# Patient Record
Sex: FEMALE | Race: WHITE | Employment: OTHER | URBAN - METROPOLITAN AREA
[De-identification: names, ages, dates, MRNs, and addresses within clinical notes are randomized per-mention and may not be internally consistent; named-entity substitution may affect disease eponyms.]

---

## 2020-06-30 ENCOUNTER — TELEPHONE (OUTPATIENT)
Dept: PULMONOLOGY | Age: 65
End: 2020-06-30

## 2020-07-02 ENCOUNTER — TELEPHONE (OUTPATIENT)
Dept: PULMONOLOGY | Age: 65
End: 2020-07-02

## 2020-07-02 ENCOUNTER — VIRTUAL VISIT (OUTPATIENT)
Dept: PULMONOLOGY | Age: 65
End: 2020-07-02
Payer: MEDICARE

## 2020-07-02 PROCEDURE — 99204 OFFICE O/P NEW MOD 45 MIN: CPT | Performed by: INTERNAL MEDICINE

## 2020-07-02 RX ORDER — LISINOPRIL AND HYDROCHLOROTHIAZIDE 12.5; 1 MG/1; MG/1
1 TABLET ORAL DAILY
COMMUNITY

## 2020-07-02 RX ORDER — ALBUTEROL SULFATE 90 UG/1
2 AEROSOL, METERED RESPIRATORY (INHALATION) EVERY 6 HOURS PRN
Qty: 1 INHALER | Refills: 6 | Status: SHIPPED | OUTPATIENT
Start: 2020-07-02 | End: 2020-08-03

## 2020-07-02 RX ORDER — MULTIVIT WITH MINERALS/LUTEIN
1000 TABLET ORAL DAILY
COMMUNITY
End: 2022-09-13

## 2020-07-02 NOTE — PROGRESS NOTES
[facility]                                                          TELEHEALTH EVALUATION: Service performed was Audio/Visual (During MQUNX-58 public health emergency) and not a face-to-face visit           CHIEF COMPLAINT: Cough    Consulting provider: VEL Tracy CNP      HPI:   Cough since 11/2019, started with sinus infection/bronchitis. Received rounds of Abx. In Jan 2020 had CXR normal. Last Abx Jan 2020. Moderate. Associated with sputum- yellow and green at first and now clear. No hemoptysis. Started on [address] in November does not use. Worse at night and when laying down. Not sure what makes it better- maybe INH made it better. Smoker 1 ppd for 48 years. Old records were reviewed and summarized by me. Past Medical History:   Diagnosis Date    CAD (coronary artery disease) 5772-2664    5 cardiac stents    Hypertension        Past Surgical History:        Procedure Laterality Date    CORONARY ANGIOPLASTY WITH STENT PLACEMENT      5 stents from 4909-8823    EYE SURGERY Left 07/01/2016    cataract    EYE SURGERY Right 07/22/2016    cataract    FOOT SURGERY Bilateral     HAND SURGERY Left     Trigger finger    HYSTERECTOMY         Allergies:  has No Known Allergies. Social History:    TOBACCO:   reports that she has been smoking cigarettes. She has a 40.00 pack-year smoking history. She has never used smokeless tobacco.  ETOH:   reports no history of alcohol use.       Family History:   No lung cancer       Current Medications:    Current Outpatient Medications:     lisinopril-hydroCHLOROthiazide (PRINZIDE;ZESTORETIC) 10-12.5 MG per tablet, Take 1 tablet by mouth daily, Disp: , Rfl:     zinc 50 MG CAPS, Take by mouth daily, Disp: , Rfl:     Ascorbic Acid (VITAMIN C) 1000 MG tablet, Take 1,000 mg by mouth daily, Disp: , Rfl:     Multiple Vitamins-Minerals (MULTIVITAMIN ADULTS PO), Take by mouth, Disp: , Rfl:   ELDERBERRY PO, Take by mouth, Disp: , Rfl:     clopidogrel (PLAVIX) 75 MG tablet, Take 75 mg by mouth daily, Disp: , Rfl:     isosorbide mononitrate (IMDUR) 60 MG CR tablet, Take 120 mg by mouth daily , Disp: , Rfl:     aspirin 325 MG tablet, Take 325 mg by mouth daily, Disp: , Rfl:       REVIEW OF SYSTEMS:  Constitutional: Negative for fever  HENT: Negative for sore throat  Eyes: Negative for redness   Respiratory: +  dyspnea, cough  Cardiovascular: Negative for chest pain  Gastrointestinal: Negative for vomiting, diarrhea   Genitourinary: Negative for hematuria   Musculoskeletal: Negative for arthralgias   Skin: Negative for rash  Neurological: Negative for syncope  Hematological: Negative for adenopathy  Psychiatric/Behavorial: Negative for anxiety      Objective:   PHYSICAL EXAM:    There were no vitals taken for this visit.' on RA  O2 Sat:  HR:  BP:  RR:  Mallampati class IV. Temperature:  Constitutional:  No acute distress. Appears well developed and nourished. Eyes: No sclera icterus. EOM intact. No visible discharge. HENT: Head is normocephalic and atraumatic. Mucus membranes are moist and the tongue appears normal. Normal appearing nose. External Ears normal.   Neck: No visualized mass. [place] is midline   Resp: No accessory muscle use. Respiratory effort normal. No visualized signs of difficulty breathing or respiratory distress. Cardiovascular: No LE edema. Musculoskeletal: Normal gait with no signs of ataxia. Normal range of motion of the neck. Skin: No significant exanthematous lesions or discoloration noted on facial skin    Neuro: Awake. Alert. Able to follow commands. No facial asymmetry. No gaze palsy. Psych: No agitation. Normal affect. No hallucinations. Oriented to person/time/place. No anxiety. Normal judgement and insight.                 DATA reviewed by me:   None found    Assessment:       · Chronic cough with SOB and wheezes- likely underlying COPD   · 48 pack year smoking    · CAD post stenting       Plan:       Obtain records for CXR   Albuterol 2 puffs Q4-6 hrs PRN  Spiriva Respimat: Two inhalations (5 mcg) once daily (maximum: 2 inhalations per 24 hours)  PFTs and 6MW  CT chest, low dose protocol, screening for lung cancer. Risks, benefits and alternatives including doing nothing were discussed with patient. Smoking cessation counseling. Advised to follow up with cardiology               Venkatesh Foley is a 76 y.o. female being evaluated by a Virtual Visit (video visit) encounter to address concerns as mentioned above. A caregiver was present when appropriate. Due to this being a TeleHealth encounter ([facility]), evaluation of the following organ systems was limited: Vitals/Constitutional/EENT/Resp/CV/GI//MS/Neuro/Skin/Heme-Lymph-Imm. Pursuant to the emergency declaration under the [address] [place] [address], [address] [place] [address] and the [employer] Act, this Virtual Visit was conducted with patient's (and/or legal guardian's) consent, to reduce the patient's risk of exposure to COVID-19 and provide necessary medical care. The patient (and/or legal guardian) has also been advised to contact this office for worsening conditions or problems, and seek emergency medical treatment and/or call 911 if deemed necessary. Services were provided through a video synchronous discussion virtually to substitute for in-person clinic visit. Patient was located in her home, provider was located in his office. --Rashida Bland MD on 7/2/2020 at 11:34 AM    An electronic signature was used to authenticate this note.

## 2020-07-15 ENCOUNTER — HOSPITAL ENCOUNTER (OUTPATIENT)
Dept: CT IMAGING | Age: 65
Discharge: HOME | End: 2020-07-15
Payer: MEDICARE

## 2020-07-15 PROCEDURE — G0297 LDCT FOR LUNG CA SCREEN: HCPCS

## 2020-07-16 ENCOUNTER — TELEPHONE (OUTPATIENT)
Dept: PULMONOLOGY | Age: 65
End: 2020-07-16

## 2020-07-24 ENCOUNTER — HOSPITAL ENCOUNTER (OUTPATIENT)
Dept: PET IMAGING | Age: 65
Discharge: HOME | End: 2020-07-24
Payer: MEDICARE

## 2020-07-24 VITALS — BODY MASS INDEX: 32.1 KG/M2 | HEIGHT: 61 IN | WEIGHT: 170 LBS

## 2020-07-24 PROCEDURE — 78815 PET IMAGE W/CT SKULL-THIGH: CPT

## 2020-07-24 PROCEDURE — 3430000000 HC RX DIAGNOSTIC RADIOPHARMACEUTICAL: Performed by: INTERNAL MEDICINE

## 2020-07-24 PROCEDURE — A9552 F18 FDG: HCPCS | Performed by: INTERNAL MEDICINE

## 2020-07-24 RX ORDER — FLUDEOXYGLUCOSE F 18 200 MCI/ML
15.2 INJECTION, SOLUTION INTRAVENOUS
Status: COMPLETED | OUTPATIENT
Start: 2020-07-24 | End: 2020-07-24

## 2020-07-24 RX ADMIN — FLUDEOXYGLUCOSE F 18 15.2 MILLICURIE: 200 INJECTION, SOLUTION INTRAVENOUS at 17:08

## 2020-07-28 ENCOUNTER — TELEPHONE (OUTPATIENT)
Dept: PULMONOLOGY | Age: 65
End: 2020-07-28

## 2020-07-28 RX ORDER — UMECLIDINIUM 62.5 UG/1
1 AEROSOL, POWDER ORAL DAILY
Qty: 30 EACH | Refills: 6 | Status: SHIPPED | OUTPATIENT
Start: 2020-07-28 | End: 2020-08-03

## 2020-07-28 NOTE — TELEPHONE ENCOUNTER
[facility] pharmacy sent fax, pt insurance will not cover Spiriva respimat. Preferred medication is Incruse. Script pending if appropriate. VV 7/2/20:       Assessment:       · Chronic cough with SOB and wheezes- likely underlying COPD   · 48 pack year smoking    · CAD post stenting       Plan:       · Obtain records for CXR   · Albuterol 2 puffs Q4-6 hrs PRN  · Spiriva Respimat: Two inhalations (5 mcg) once daily (maximum: 2 inhalations per 24 hours)  · PFTs and 6MW  · CT chest, low dose protocol, screening for lung cancer. Risks, benefits and alternatives including doing nothing were discussed with patient. · Smoking cessation counseling.    · Advised to follow up with cardiology

## 2020-08-03 ENCOUNTER — VIRTUAL VISIT (OUTPATIENT)
Dept: PULMONOLOGY | Age: 65
End: 2020-08-03
Payer: MEDICARE

## 2020-08-03 PROCEDURE — 99214 OFFICE O/P EST MOD 30 MIN: CPT | Performed by: INTERNAL MEDICINE

## 2020-08-03 RX ORDER — ALBUTEROL SULFATE 90 UG/1
2 AEROSOL, METERED RESPIRATORY (INHALATION) EVERY 6 HOURS PRN
Qty: 1 INHALER | Refills: 6 | Status: SHIPPED | OUTPATIENT
Start: 2020-08-03 | End: 2021-08-09

## 2020-08-03 RX ORDER — UMECLIDINIUM 62.5 UG/1
1 AEROSOL, POWDER ORAL DAILY
Qty: 30 EACH | Refills: 6 | Status: SHIPPED | OUTPATIENT
Start: 2020-08-03 | End: 2020-11-03

## 2020-08-03 NOTE — PROGRESS NOTES
[facility]                                                          TELEHEALTH EVALUATION: Service performed was Audio/Visual (During XQUYB-81 public health emergency) and not a face-to-face visit           CHIEF COMPLAINT: follow up CT/PET        HPI:   CT chest/PET reviewed by me and noted below. Results were dicussed with patient and multiple good questions were answered. Has not picked her IBD  Has not seen her cardiologist   Still with cough  No hemoptysis   Smoking 1 ppd       Cough since 11/2019, started with sinus infection/bronchitis. Received rounds of Abx. In Jan 2020 had CXR normal. Last Abx Jan 2020. Moderate. Associated with sputum- yellow and green at first and now clear. No hemoptysis. Started on [address] in November does not use. Worse at night and when laying down. Not sure what makes it better- maybe INH made it better. Smoker 1 ppd for 48 years. Old records were reviewed and summarized by me. Past Medical History:   Diagnosis Date    CAD (coronary artery disease) [id number]    5 cardiac stents    Hypertension        Past Surgical History:        Procedure Laterality Date    CORONARY ANGIOPLASTY WITH STENT PLACEMENT      5 stents from 3656-5425    EYE SURGERY Left 07/01/2016    cataract    EYE SURGERY Right 07/22/2016    cataract    FOOT SURGERY Bilateral     HAND SURGERY Left     Trigger finger    HYSTERECTOMY         Allergies:  has No Known Allergies. Social History:    TOBACCO:   reports that she has been smoking cigarettes. She has a 40.00 pack-year smoking history. She has never used smokeless tobacco.  ETOH:   reports no history of alcohol use.       Family History:   No lung cancer       Current Medications:    Current Outpatient Medications:     lisinopril-hydroCHLOROthiazide (PRINZIDE;ZESTORETIC) 10-12.5 MG per tablet, Take 1 tablet by mouth daily, Disp: , Rfl:     zinc 50 MG CAPS, Take by mouth daily, Disp: , Rfl:     Ascorbic Acid (VITAMIN C) 1000 MG tablet, Take 1,000 mg by mouth daily, Disp: , Rfl:     Multiple Vitamins-Minerals (MULTIVITAMIN ADULTS PO), Take by mouth, Disp: , Rfl:     ELDERBERRY PO, Take by mouth, Disp: , Rfl:     clopidogrel (PLAVIX) 75 MG tablet, Take 75 mg by mouth daily, Disp: , Rfl:     isosorbide mononitrate (IMDUR) 60 MG CR tablet, Take 120 mg by mouth daily , Disp: , Rfl:     aspirin 325 MG tablet, Take 325 mg by mouth daily, Disp: , Rfl:       Objective:   PHYSICAL EXAM:    There were no vitals taken for this visit.' on RA  O2 Sat:  HR:  BP:  RR:  Mallampati class IV. Temperature:  Constitutional:  No acute distress. Appears well developed and nourished. Eyes: No sclera icterus. EOM intact. No visible discharge. HENT: Head is normocephalic and atraumatic. Mucus membranes are moist and the tongue appears normal. Normal appearing nose. External Ears normal.   Neck: No visualized mass. Trellis Callander is midline   Resp: No accessory muscle use. Respiratory effort normal. No visualized signs of difficulty breathing or respiratory distress. Cardiovascular: No LE edema. Musculoskeletal: Normal gait with no signs of ataxia. Normal range of motion of the neck. Skin: No significant exanthematous lesions or discoloration noted on facial skin    Neuro: Awake. Alert. Able to follow commands. No facial asymmetry. No gaze palsy. Psych: No agitation. Normal affect. No hallucinations. Oriented to person/time/place. No anxiety. Normal judgement and insight.                 DATA reviewed by me:   CT chest 7/14/2020 imaging reviewed by me and showed  KAMINI 1 cm nodule  Mild to moderate emphysema       PET scan 7/24/2020 imaging reviewed by me and showed  No suspicious hypermetabolic activity with a lung nodule  Heterogeneous appearance of the thyroid gland with nodule and calcifications  Infrarenal aorti aneurysm 3 cm    Assessment:       · Pulmonary emphysema  · KAMINI 1 cm nodule on CT 7/14/2020. No metabolic activities on PET scan 7/24/2020. · Thyroid nodule   · 48 pack year smoking    · CAD post stenting       Plan:       Advised to  her IBD and use Incruse Ellipta and Albuterol 2 puffs Q4-6 hrs PRN  Options of Bx, resection and watchful waiting were discussed with patient. I recommend radiographic follow up CT chest in 3 months    Thyroid US  TSH   Follow-up with PCP regarding aortic aneurysm and thyroid nodule-patient verbalized understanding  Smoking cessation counseling   Advised to follow up with cardiology               Herman Colón is a 76 y.o. female being evaluated by a Virtual Visit (video visit) encounter to address concerns as mentioned above. A caregiver was present when appropriate. Due to this being a TeleHealth encounter (During [facility] public health emergency), evaluation of the following organ systems was limited: Vitals/Constitutional/EENT/Resp/CV/GI//MS/Neuro/Skin/Heme-Lymph-Imm. Pursuant to the emergency declaration under the [address] [place] [address] authority and the [email or web address] and [employer] Act, this Virtual Visit was conducted with patient's (and/or legal guardian's) consent, to reduce the patient's risk of exposure to COVID-19 and provide necessary medical care. The patient (and/or legal guardian) has also been advised to contact this office for worsening conditions or problems, and seek emergency medical treatment and/or call 911 if deemed necessary. Services were provided through a video synchronous discussion virtually to substitute for in-person clinic visit. Patient was located in her home, provider was located in his office. --Anne Marie Heard MD on 8/3/2020 at 10:16 AM    An electronic signature was used to authenticate this note.

## 2020-08-06 ENCOUNTER — HOSPITAL ENCOUNTER (OUTPATIENT)
Age: 65
Discharge: HOME | End: 2020-08-06
Payer: MEDICARE

## 2020-08-06 ENCOUNTER — HOSPITAL ENCOUNTER (OUTPATIENT)
Dept: ULTRASOUND IMAGING | Age: 65
Discharge: HOME | End: 2020-08-06
Payer: MEDICARE

## 2020-08-06 LAB — TSH REFLEX: 1.58 UIU/ML (ref 0.27–4.2)

## 2020-08-06 PROCEDURE — 84443 ASSAY THYROID STIM HORMONE: CPT

## 2020-08-06 PROCEDURE — 36415 COLL VENOUS BLD VENIPUNCTURE: CPT

## 2020-08-06 PROCEDURE — 76536 US EXAM OF HEAD AND NECK: CPT

## 2020-08-07 ENCOUNTER — TELEPHONE (OUTPATIENT)
Dept: PULMONOLOGY | Age: 65
End: 2020-08-07

## 2020-08-07 NOTE — TELEPHONE ENCOUNTER
Pt called and said that it is going to cost her $349 to fill Incruse. Pt states that she is not going to fill the medication at this time as she can not afford. Pt has a $450 deductible. States that she does not think she needs the inhaler. Pt also said that it is going to cost her $187 to fill her Albuterol inhaler. Pt will not be filling either. LOV: 8/3/20  Assessment:       · Pulmonary emphysema  · KAMINI 1 cm nodule on CT 7/14/2020. No metabolic activities on PET scan 7/24/2020. · Thyroid nodule   · 48 pack year smoking    · CAD post stenting       Plan:       · Advised to  her IBD and use Incruse Ellipta and Albuterol 2 puffs Q4-6 hrs PRN  · Options of Bx, resection and watchful waiting were discussed with patient.  I recommend radiographic follow up CT chest in 3 months    · Thyroid US  · TSH   · Follow-up with PCP regarding aortic aneurysm and thyroid nodule-patient verbalized understanding  · Smoking cessation counseling   · Advised to follow up with cardiology

## 2020-10-26 ENCOUNTER — HOSPITAL ENCOUNTER (OUTPATIENT)
Dept: CT IMAGING | Age: 65
Discharge: HOME | End: 2020-10-26
Payer: MEDICARE

## 2020-10-26 PROCEDURE — 71250 CT THORAX DX C-: CPT

## 2020-11-03 ENCOUNTER — VIRTUAL VISIT (OUTPATIENT)
Dept: PULMONOLOGY | Age: 65
End: 2020-11-03
Payer: MEDICARE

## 2020-11-03 PROCEDURE — 99214 OFFICE O/P EST MOD 30 MIN: CPT | Performed by: INTERNAL MEDICINE

## 2020-11-03 NOTE — PROGRESS NOTES
P Pulmonary, Critical Care [facility] Sleep Special[facility]                                                          TELEHEALTH EVALUATION: Service performed was Audio/Visual (During OBS-87 public health emergency) and not a face-to-face visit           CHIEF COMPLAINT: follow up CT        HPI:   CT chest reviewed by me and noted below. Results were dicussed with patient and multiple good questions were answered. Uses Albuterol once a week   Cough is better   No hemoptysis   Smoking 1 ppd         Past Medical History:   Diagnosis Date    CAD (coronary artery disease) 2804-9824    5 cardiac stents    Hypertension        Past Surgical History:        Procedure Laterality Date    CORONARY ANGIOPLASTY WITH STENT PLACEMENT      5 stents from 3004-8929    EYE SURGERY Left 07/01/2016    cataract    EYE SURGERY Right 07/22/2016    cataract    FOOT SURGERY Bilateral     HAND SURGERY Left     Trigger finger    HYSTERECTOMY         Allergies:  has No Known Allergies. Social History:    TOBACCO:   reports that she has been smoking cigarettes. She has a 41.00 pack-year smoking history. She has never used smokeless tobacco.  ETOH:   reports no history of alcohol use.       Family History:   No lung cancer       Current Medications:    Current Outpatient Medications:     Cholecalciferol (VITAMIN D3 PO), Take by mouth, Disp: , Rfl:     albuterol sulfate HFA (PROAIR HFA) 108 (90 Base) MCG/ACT inhaler, Inhale 2 puffs into the lungs every 6 hours as needed for Wheezing or Shortness of Breath, Disp: 1 Inhaler, Rfl: 6    lisinopril-hydroCHLOROthiazide (PRINZIDE;ZESTORETIC) 10-12.5 MG per tablet, Take 1 tablet by mouth daily, Disp: , Rfl:     zinc 50 MG CAPS, Take by mouth daily, Disp: , Rfl:     Ascorbic Acid (VITAMIN C) 1000 MG tablet, Take 1,000 mg by mouth daily, Disp: , Rfl:     Multiple Vitamins-Minerals (MULTIVITAMIN ADULTS PO), Take by mouth, Disp: , Rfl:     ELDERBERRY PO, Take by mouth, Disp: , Rfl:     clopidogrel (PLAVIX) 75 MG tablet, Take 75 mg by mouth daily, Disp: , Rfl:     isosorbide mononitrate (IMDUR) 60 MG CR tablet, Take 120 mg by mouth daily , Disp: , Rfl:     aspirin 325 MG tablet, Take 325 mg by mouth daily, Disp: , Rfl:       Objective:   PHYSICAL EXAM:    There were no vitals taken for this visit.' on RA  Mallampati class IV. Constitutional:  No acute distress. Appears well developed and nourished. Eyes: No sclera icterus. EOM intact. No visible discharge. HENT: Head is normocephalic and atraumatic. Mucus membranes are moist and the tongue appears normal. Normal appearing nose. External Ears normal.   Neck: No visualized mass. Marie Bilis is midline   Resp: No accessory muscle use. Respiratory effort normal. No visualized signs of difficulty breathing or respiratory distress. Cardiovascular: No LE edema. Musculoskeletal: Normal gait with no signs of ataxia. Normal range of motion of the neck. Skin: No significant exanthematous lesions or discoloration noted on facial skin    Neuro: Awake. Alert. Able to follow commands. No facial asymmetry. No gaze palsy. Psych: No agitation. Normal affect. No hallucinations. Oriented to person/time/place. No anxiety. Normal judgement and insight. DATA reviewed by me:   CT chest 7/14/2020 imaging reviewed by me and showed  KAMINI 1 cm nodule  Mild to moderate emphysema       PET scan 7/24/2020 imaging reviewed by me and showed  No suspicious hypermetabolic activity with a lung nodule  Heterogeneous appearance of the thyroid gland with nodule and calcifications  Infrarenal aorti aneurysm 3 cm    CT chest 10/26/2020 imaging reviewed by me and showed  No definitive change in left upper lobe nodule    Assessment:       · Pulmonary emphysema  · KAMINI 1 cm nodule on CT 7/14/2020. No metabolic activities on PET scan 7/24/2020. Not significantly changed on repeat CT 10/26/2020.   · Thyroid nodule-  Followed by PCP   · 48 pack year smoking    · CAD post stenting       Plan:       Options of Bx, resection and watchful waiting were discussed with patient. I recommend radiographic follow up CT chest early Feb 2021  Continue Albuterol 2 puffs Q4-6 hrs PRN  Advised to to get Pneumococcal vaccine and influenza vaccine. Smoking cessation counseling                 Sun Bright is a 76 y.o. female being evaluated by a Virtual Visit (video visit) encounter to address concerns as mentioned above. A caregiver was present when appropriate. Due to this being a TeleHealth encounter (During [facility]-53 public health emergency), evaluation of the following organ systems was limited: Vitals/Constitutional/EENT/Resp/CV/GI//MS/Neuro/Skin/Heme-Lymph-Imm. Pursuant to the emergency declaration under the [address] [place] [address], [address] [place] [address] authority and the [email or web address] and [employer] Act, this Virtual Visit was conducted with patient's (and/or legal guardian's) consent, to reduce the patient's risk of exposure to COVID-19 and provide necessary medical care. The patient (and/or legal guardian) has also been advised to contact this office for worsening conditions or problems, and seek emergency medical treatment and/or call 911 if deemed necessary. Services were provided through a video synchronous discussion virtually to substitute for in-person clinic visit. Patient was located in her home, provider was located in his office. --Antonella Edwards MD on 11/3/2020 at 10:52 AM    An electronic signature was used to authenticate this note.

## 2020-11-03 NOTE — PATIENT INSTRUCTIONS
Remember to bring a list of pulmonary medications and any CPAP or BiPAP machines to your next appointment with the office. Please keep all of your future appointments scheduled by [facility] Pulmonary office. Out of respect for other patients and providers, you may be asked to reschedule your appointment if you arrive later than your scheduled appointment time. Appointments cancelled less than 24hrs in advance will be considered a no show. Patients with three missed appointments within 1 year or four missed appointments within 2 years can be dismissed from the practice. You may receive a survey regarding the care you received during your visit. Your input is valuable to us. We encourage you to complete and return your survey. We hope you will choose us in the future for your healthcare needs. Pt instructed of all future appointment dates & times, including radiology, labs, procedures & referrals. If procedures were scheduled preparation instructions provided. Instructions on future appointments with [facility] were given. Tips to Help You Stop Smoking (taken from Up-To-Date)      Cigarette smoking is a preventable cause of death in the [place]. Quitting smoking now can decrease your risk of getting smoking-related illnesses like heart disease, stroke, cancer & COPD. S = Set a quit date. T = Tell family, friends, and the people around you that you plan to quit. A = Anticipate or plan ahead for the tough times you'll face while quitting. R = Remove cigarettes and other tobacco products from your home, car, and work. T = Talk to your doctor about getting help to quit. Your doctor may give you medicines to reduce your craving for cigarettes & the unpleasant symptoms that happen when you stop smoking (called withdrawal symptoms). What are the symptoms of withdrawal? -- The symptoms include:   ?Trouble sleeping   ? Being irritable, anxious or restless   ? Getting frustrated or angry   ? Having trouble thinking clearly  Nicotine replacement therapy eases withdrawal and reduces your bodys craving for nicotine, the main drug found in cigarettes. Non-prescription forms of nicotine replacement include skin patches, lozenges, and gum. Two prescription medications are available that have been proven to help people stop smoking:  ? Bupropion is a prescription medicine that reduces your desire to smoke. This medicine is sold under the brand names Zyban and Wellbutrin & as a generic formulation. ? Varenicline (brand name: Chantix) is a prescription medicine that reduces withdrawal symptoms and cigarette cravings. If you take bupropion or varenicline and you have any new or worsening depressed mood or thoughts of harming yourself or someone else, stop taking the medicine and call your doctor. Buproprion should not be taken by anyone with a history of seizure or epilepsy. Will I gain weight if I quit? -- Yes, you might gain a few pounds. But quitting smoking will have a much more positive effect on your health than weighing a few pounds more. Plus, you can help prevent some weight gain by being more active and eating less. Taking the medicine bupropion might help control weight gain. What else can I do to improve my chances of quitting? -- You can:  ?Start exercising. ?Stay away from smokers and places that you associate with smoking. If people close to you smoke, ask them to quit with you. ? Keep gum, hard candy, or something to put in your mouth handy. If you get a craving for a cigarette, try one of these instead. ?Dont give up, even if you start smoking again. It takes most people a few tries before they succeed. You can also get help from a free phone line (3-268-QUIT-NOW) or go online to www.smokefree.gov. Your pulmonary team is here to help you quit.   Call for assistance [phone]

## 2020-11-10 ENCOUNTER — TELEPHONE (OUTPATIENT)
Dept: VASCULAR SURGERY | Age: 65
End: 2020-11-10

## 2020-11-20 ENCOUNTER — OFFICE VISIT (OUTPATIENT)
Dept: VASCULAR SURGERY | Age: 65
End: 2020-11-20
Payer: MEDICARE

## 2020-11-20 VITALS
BODY MASS INDEX: 32.85 KG/M2 | SYSTOLIC BLOOD PRESSURE: 130 MMHG | HEIGHT: 61 IN | WEIGHT: 174 LBS | DIASTOLIC BLOOD PRESSURE: 80 MMHG

## 2020-11-20 PROCEDURE — G8417 CALC BMI ABV UP PARAM F/U: HCPCS | Performed by: SURGERY

## 2020-11-20 PROCEDURE — 99203 OFFICE O/P NEW LOW 30 MIN: CPT | Performed by: SURGERY

## 2020-11-20 PROCEDURE — 1090F PRES/ABSN URINE INCON ASSESS: CPT | Performed by: SURGERY

## 2020-11-20 PROCEDURE — G8427 DOCREV CUR MEDS BY ELIG CLIN: HCPCS | Performed by: SURGERY

## 2020-11-20 PROCEDURE — G8484 FLU IMMUNIZE NO ADMIN: HCPCS | Performed by: SURGERY

## 2020-11-20 NOTE — PROGRESS NOTES
Outpatient Consultation / H&P    Date of Consultation:  11/20/2020    PCP:  VEL Jung - CNP     Referring Provider:  Dr. Kevin Saldaña     Chief Complaint:   Chief Complaint   Patient presents with    Other     poor circulation left leg. ulcer left baby toe.pamlr        History of Present Illness: We are asked to see this patient in consultation by Dr. Kevin Saldaña regarding toe ulcerations. Jeremy Sampson is a 76 y.o. female who suffered minor trauma to left 5th toe. She has developed an ulcer at the tip which is dry and non infected. She does have some pain in the toe. The toenail has come off. She denies other foot pain or claudication. Past Medical History:  Past Medical History:   Diagnosis Date    CAD (coronary artery disease) 2571-6853    5 cardiac stents    Hypertension        Past Surgical History:  Past Surgical History:   Procedure Laterality Date    CORONARY ANGIOPLASTY WITH STENT PLACEMENT      5 stents from [id number]    EYE SURGERY Left 07/01/2016    cataract    EYE SURGERY Right 07/22/2016    cataract    FOOT SURGERY Bilateral     HAND SURGERY Left     Trigger finger    HYSTERECTOMY         Home Medications:   Prior to Admission medications    Medication Sig Start Date End Date Taking?  Authorizing Provider   Cholecalciferol (VITAMIN D3 PO) Take by mouth   Yes Historical Provider, MD   albuterol sulfate HFA (PROAIR HFA) 108 (90 Base) MCG/ACT inhaler Inhale 2 puffs into the lungs every 6 hours as needed for Wheezing or Shortness of Breath 8/3/20  Yes Mary Terry MD   lisinopril-hydroCHLOROthiazide (PRINZIDE;ZESTORETIC) 10-12.5 MG per tablet Take 1 tablet by mouth daily   Yes Historical Provider, MD   zinc 50 MG CAPS Take by mouth daily   Yes Historical Provider, MD   Ascorbic Acid (VITAMIN C) 1000 MG tablet Take 1,000 mg by mouth daily   Yes Historical Provider, MD   Multiple Vitamins-Minerals (MULTIVITAMIN ADULTS PO) Take by mouth   Yes Historical Provider, MD ELDERBERRY PO Take by mouth   Yes Historical Provider, MD   clopidogrel (PLAVIX) 75 MG tablet Take 75 mg by mouth daily   Yes Historical Provider, MD   isosorbide mononitrate (IMDUR) 60 MG CR tablet Take 120 mg by mouth daily    Yes Historical Provider, MD   aspirin 325 MG tablet Take 325 mg by mouth daily   Yes Historical Provider, MD        Allergies:  Patient has no known allergies. Social History:      Social History     Socioeconomic History    Marital status:      Spouse name: Not on file    Number of children: Not on file    Years of education: Not on file    Highest education level: Not on file   Occupational History    Not on file   Social Needs    Financial resource strain: Not on file    Food insecurity     Worry: Not on file     Inability: Not on file    Transportation needs     Medical: Not on file     Non-medical: Not on file   Tobacco Use    Smoking status: Current Every Day Smoker     Packs/day: 1.00     Years: 41.00     Pack years: 41.00     Types: Cigarettes    Smokeless tobacco: Never Used    Tobacco comment: 11/3/2020  1ppd   Substance and Sexual Activity    Alcohol use: No    Drug use: No    Sexual activity: Never   Lifestyle    Physical activity     Days per week: Not on file     Minutes per session: Not on file    Stress: Not on file   Relationships    Social connections     Talks on phone: Not on file     Gets together: Not on file     Attends Orthodox service: Not on file     Active member of club or organization: Not on file     Attends meetings of clubs or organizations: Not on file     Relationship status: Not on file    Intimate partner violence     Fear of current or ex partner: Not on file     Emotionally abused: Not on file     Physically abused: Not on file     Forced sexual activity: Not on file   Other Topics Concern    Not on file   Social History Narrative    Not on file       Family History:    History reviewed.  No pertinent family history. Review of Systems:  A 14 point review of systems was completed. Pertinent positives identified in the HPI, all other review of systems negative. Physical Examination:    /80 (Site: Left Upper Arm)   Ht 5' 1\" (1.549 m)   Wt 174 lb (78.9 kg)   BMI 32.88 kg/m²     Weight: 174 lb (78.9 kg)       General appearance: NAD  Eyes: PERRLA  Neck: no JVD, no lymphadenopathy. Respiratory: effort is unlabored, no crackles, wheezes or rubs. Cardiovascular: regular, no murmur. No carotid bruits. No edema or varicosities. Pulses:    DP PT   RIGHT 2 2   LEFT 2 2   GI: abdomen soft, nondistended, no organomegaly. Musculoskeletal: strength and tone normal.  Extremities: Dry ulcer at tip of left 5th toe with absent toenail. Neuro/psychiatric: grossly intact. MEDICAL DECISION MAKING/TESTING      Arterial exam:  JULIET's 1+ bilaterally with normal waveforms. Normal TBI's. Assessment:      Diagnosis Orders   1. Toe ulcer, left, with fat layer exposed ([address]       No evidence of large vessel occlusive disease either by exam or non invasive testing. Trauma may have caused damage/thrombosis of digital artery. Recommendations/Plan:  Local wound care. Partial toe amputation if necessary with expectation of good healing.       Damaris Medina MD, FACS

## 2020-12-18 ENCOUNTER — HOSPITAL ENCOUNTER (OUTPATIENT)
Age: 65
Discharge: HOME | End: 2020-12-18
Payer: MEDICARE

## 2020-12-18 LAB — SARS-COV-2: NOT DETECTED

## 2020-12-18 PROCEDURE — U0003 INFECTIOUS AGENT DETECTION BY NUCLEIC ACID (DNA OR RNA); SEVERE ACUTE RESPIRATORY SYNDROME CORONAVIRUS 2 (SARS-COV-2) (CORONAVIRUS DISEASE [COVID-19]), AMPLIFIED PROBE TECHNIQUE, MAKING USE OF HIGH THROUGHPUT TECHNOLOGIES AS DESCRIBED BY CMS-2020-01-R: HCPCS

## 2020-12-23 ENCOUNTER — ANESTHESIA (OUTPATIENT)
Dept: OPERATING ROOM | Age: 65
End: 2020-12-23
Payer: MEDICARE

## 2020-12-23 ENCOUNTER — ANESTHESIA EVENT (OUTPATIENT)
Dept: OPERATING ROOM | Age: 65
End: 2020-12-23
Payer: MEDICARE

## 2020-12-23 ENCOUNTER — HOSPITAL ENCOUNTER (OUTPATIENT)
Age: 65
Setting detail: OUTPATIENT SURGERY
Discharge: HOME | End: 2020-12-23
Attending: PODIATRIST | Admitting: PODIATRIST
Payer: MEDICARE

## 2020-12-23 VITALS
TEMPERATURE: 98.5 F | OXYGEN SATURATION: 97 % | HEIGHT: 61 IN | DIASTOLIC BLOOD PRESSURE: 74 MMHG | HEART RATE: 81 BPM | SYSTOLIC BLOOD PRESSURE: 162 MMHG | WEIGHT: 170 LBS | RESPIRATION RATE: 16 BRPM | BODY MASS INDEX: 32.1 KG/M2

## 2020-12-23 VITALS
DIASTOLIC BLOOD PRESSURE: 81 MMHG | RESPIRATION RATE: 10 BRPM | TEMPERATURE: 98.2 F | OXYGEN SATURATION: 100 % | SYSTOLIC BLOOD PRESSURE: 138 MMHG

## 2020-12-23 PROCEDURE — 2580000003 HC RX 258: Performed by: PODIATRIST

## 2020-12-23 PROCEDURE — 6370000000 HC RX 637 (ALT 250 FOR IP)

## 2020-12-23 PROCEDURE — 7100000010 HC PHASE II RECOVERY - FIRST 15 MIN: Performed by: PODIATRIST

## 2020-12-23 PROCEDURE — 2500000003 HC RX 250 WO HCPCS: Performed by: ANESTHESIOLOGY

## 2020-12-23 PROCEDURE — 7100000001 HC PACU RECOVERY - ADDTL 15 MIN: Performed by: PODIATRIST

## 2020-12-23 PROCEDURE — 2500000003 HC RX 250 WO HCPCS: Performed by: PODIATRIST

## 2020-12-23 PROCEDURE — 3600000013 HC SURGERY LEVEL 3 ADDTL 15MIN: Performed by: PODIATRIST

## 2020-12-23 PROCEDURE — 87070 CULTURE OTHR SPECIMN AEROBIC: CPT

## 2020-12-23 PROCEDURE — 87075 CULTR BACTERIA EXCEPT BLOOD: CPT

## 2020-12-23 PROCEDURE — 7100000000 HC PACU RECOVERY - FIRST 15 MIN: Performed by: PODIATRIST

## 2020-12-23 PROCEDURE — 2500000003 HC RX 250 WO HCPCS: Performed by: NURSE ANESTHETIST, CERTIFIED REGISTERED

## 2020-12-23 PROCEDURE — 2580000003 HC RX 258: Performed by: ANESTHESIOLOGY

## 2020-12-23 PROCEDURE — 3600000003 HC SURGERY LEVEL 3 BASE: Performed by: PODIATRIST

## 2020-12-23 PROCEDURE — 3700000000 HC ANESTHESIA ATTENDED CARE: Performed by: PODIATRIST

## 2020-12-23 PROCEDURE — 2709999900 HC NON-CHARGEABLE SUPPLY: Performed by: PODIATRIST

## 2020-12-23 PROCEDURE — 6360000002 HC RX W HCPCS: Performed by: PODIATRIST

## 2020-12-23 PROCEDURE — 7100000011 HC PHASE II RECOVERY - ADDTL 15 MIN: Performed by: PODIATRIST

## 2020-12-23 PROCEDURE — 6360000002 HC RX W HCPCS: Performed by: NURSE ANESTHETIST, CERTIFIED REGISTERED

## 2020-12-23 PROCEDURE — 87077 CULTURE AEROBIC IDENTIFY: CPT

## 2020-12-23 PROCEDURE — 3700000001 HC ADD 15 MINUTES (ANESTHESIA): Performed by: PODIATRIST

## 2020-12-23 PROCEDURE — 87186 SC STD MICRODIL/AGAR DIL: CPT

## 2020-12-23 RX ORDER — LABETALOL HYDROCHLORIDE 5 MG/ML
5 INJECTION, SOLUTION INTRAVENOUS EVERY 10 MIN PRN
Status: DISCONTINUED | OUTPATIENT
Start: 2020-12-23 | End: 2020-12-23 | Stop reason: HOSPADM

## 2020-12-23 RX ORDER — ONDANSETRON 2 MG/ML
INJECTION INTRAMUSCULAR; INTRAVENOUS PRN
Status: DISCONTINUED | OUTPATIENT
Start: 2020-12-23 | End: 2020-12-23 | Stop reason: SDUPTHER

## 2020-12-23 RX ORDER — DIPHENHYDRAMINE HYDROCHLORIDE 50 MG/ML
12.5 INJECTION INTRAMUSCULAR; INTRAVENOUS
Status: DISCONTINUED | OUTPATIENT
Start: 2020-12-23 | End: 2020-12-23 | Stop reason: HOSPADM

## 2020-12-23 RX ORDER — MORPHINE SULFATE 2 MG/ML
2 INJECTION, SOLUTION INTRAMUSCULAR; INTRAVENOUS EVERY 5 MIN PRN
Status: DISCONTINUED | OUTPATIENT
Start: 2020-12-23 | End: 2020-12-23 | Stop reason: HOSPADM

## 2020-12-23 RX ORDER — ACETAMINOPHEN 500 MG
1000 TABLET ORAL ONCE
Status: COMPLETED | OUTPATIENT
Start: 2020-12-23 | End: 2020-12-23

## 2020-12-23 RX ORDER — SODIUM CHLORIDE, SODIUM LACTATE, POTASSIUM CHLORIDE, CALCIUM CHLORIDE 600; 310; 30; 20 MG/100ML; MG/100ML; MG/100ML; MG/100ML
INJECTION, SOLUTION INTRAVENOUS CONTINUOUS
Status: DISCONTINUED | OUTPATIENT
Start: 2020-12-23 | End: 2020-12-23 | Stop reason: HOSPADM

## 2020-12-23 RX ORDER — ONDANSETRON 2 MG/ML
4 INJECTION INTRAMUSCULAR; INTRAVENOUS PRN
Status: DISCONTINUED | OUTPATIENT
Start: 2020-12-23 | End: 2020-12-23 | Stop reason: HOSPADM

## 2020-12-23 RX ORDER — HYDROCODONE BITARTRATE AND ACETAMINOPHEN 5; 325 MG/1; MG/1
1 TABLET ORAL EVERY 6 HOURS PRN
COMMUNITY

## 2020-12-23 RX ORDER — OXYCODONE HYDROCHLORIDE AND ACETAMINOPHEN 5; 325 MG/1; MG/1
1 TABLET ORAL PRN
Status: DISCONTINUED | OUTPATIENT
Start: 2020-12-23 | End: 2020-12-23 | Stop reason: HOSPADM

## 2020-12-23 RX ORDER — SODIUM CHLORIDE 0.9 % (FLUSH) 0.9 %
10 SYRINGE (ML) INJECTION EVERY 12 HOURS SCHEDULED
Status: DISCONTINUED | OUTPATIENT
Start: 2020-12-23 | End: 2020-12-23 | Stop reason: HOSPADM

## 2020-12-23 RX ORDER — OXYCODONE HYDROCHLORIDE AND ACETAMINOPHEN 5; 325 MG/1; MG/1
2 TABLET ORAL PRN
Status: DISCONTINUED | OUTPATIENT
Start: 2020-12-23 | End: 2020-12-23 | Stop reason: HOSPADM

## 2020-12-23 RX ORDER — BUPIVACAINE HYDROCHLORIDE 5 MG/ML
INJECTION, SOLUTION EPIDURAL; INTRACAUDAL PRN
Status: DISCONTINUED | OUTPATIENT
Start: 2020-12-23 | End: 2020-12-23 | Stop reason: ALTCHOICE

## 2020-12-23 RX ORDER — LIDOCAINE HYDROCHLORIDE 10 MG/ML
0.3 INJECTION, SOLUTION EPIDURAL; INFILTRATION; INTRACAUDAL; PERINEURAL
Status: COMPLETED | OUTPATIENT
Start: 2020-12-23 | End: 2020-12-23

## 2020-12-23 RX ORDER — MORPHINE SULFATE 2 MG/ML
1 INJECTION, SOLUTION INTRAMUSCULAR; INTRAVENOUS EVERY 5 MIN PRN
Status: DISCONTINUED | OUTPATIENT
Start: 2020-12-23 | End: 2020-12-23 | Stop reason: HOSPADM

## 2020-12-23 RX ORDER — LIDOCAINE HYDROCHLORIDE 20 MG/ML
INJECTION, SOLUTION EPIDURAL; INFILTRATION; INTRACAUDAL; PERINEURAL PRN
Status: DISCONTINUED | OUTPATIENT
Start: 2020-12-23 | End: 2020-12-23 | Stop reason: SDUPTHER

## 2020-12-23 RX ORDER — SODIUM CHLORIDE 0.9 % (FLUSH) 0.9 %
10 SYRINGE (ML) INJECTION PRN
Status: DISCONTINUED | OUTPATIENT
Start: 2020-12-23 | End: 2020-12-23 | Stop reason: HOSPADM

## 2020-12-23 RX ORDER — HYDRALAZINE HYDROCHLORIDE 20 MG/ML
5 INJECTION INTRAMUSCULAR; INTRAVENOUS EVERY 10 MIN PRN
Status: DISCONTINUED | OUTPATIENT
Start: 2020-12-23 | End: 2020-12-23 | Stop reason: HOSPADM

## 2020-12-23 RX ORDER — PROMETHAZINE HYDROCHLORIDE 25 MG/ML
6.25 INJECTION, SOLUTION INTRAMUSCULAR; INTRAVENOUS
Status: DISCONTINUED | OUTPATIENT
Start: 2020-12-23 | End: 2020-12-23 | Stop reason: HOSPADM

## 2020-12-23 RX ORDER — FENTANYL CITRATE 50 UG/ML
INJECTION, SOLUTION INTRAMUSCULAR; INTRAVENOUS PRN
Status: DISCONTINUED | OUTPATIENT
Start: 2020-12-23 | End: 2020-12-23 | Stop reason: SDUPTHER

## 2020-12-23 RX ORDER — PROPOFOL 10 MG/ML
INJECTION, EMULSION INTRAVENOUS PRN
Status: DISCONTINUED | OUTPATIENT
Start: 2020-12-23 | End: 2020-12-23 | Stop reason: SDUPTHER

## 2020-12-23 RX ORDER — DEXAMETHASONE SODIUM PHOSPHATE 4 MG/ML
INJECTION, SOLUTION INTRA-ARTICULAR; INTRALESIONAL; INTRAMUSCULAR; INTRAVENOUS; SOFT TISSUE PRN
Status: DISCONTINUED | OUTPATIENT
Start: 2020-12-23 | End: 2020-12-23 | Stop reason: SDUPTHER

## 2020-12-23 RX ORDER — ACETAMINOPHEN 500 MG
TABLET ORAL
Status: COMPLETED
Start: 2020-12-23 | End: 2020-12-23

## 2020-12-23 RX ADMIN — DEXAMETHASONE SODIUM PHOSPHATE 8 MG: 4 INJECTION, SOLUTION INTRAMUSCULAR; INTRAVENOUS at 08:08

## 2020-12-23 RX ADMIN — FENTANYL CITRATE 100 MCG: 50 INJECTION INTRAMUSCULAR; INTRAVENOUS at 08:05

## 2020-12-23 RX ADMIN — SODIUM CHLORIDE, POTASSIUM CHLORIDE, SODIUM LACTATE AND CALCIUM CHLORIDE: 600; 310; 30; 20 INJECTION, SOLUTION INTRAVENOUS at 06:56

## 2020-12-23 RX ADMIN — ONDANSETRON 4 MG: 2 INJECTION, SOLUTION INTRAMUSCULAR; INTRAVENOUS at 08:08

## 2020-12-23 RX ADMIN — VANCOMYCIN HYDROCHLORIDE 1000 MG: 1 INJECTION, POWDER, LYOPHILIZED, FOR SOLUTION INTRAVENOUS at 07:58

## 2020-12-23 RX ADMIN — ACETAMINOPHEN 500 MG: 500 TABLET, FILM COATED ORAL at 07:20

## 2020-12-23 RX ADMIN — LIDOCAINE HYDROCHLORIDE 80 MG: 20 INJECTION, SOLUTION EPIDURAL; INFILTRATION; INTRACAUDAL; PERINEURAL at 08:05

## 2020-12-23 RX ADMIN — LIDOCAINE HYDROCHLORIDE 0.1 ML: 10 INJECTION, SOLUTION EPIDURAL; INFILTRATION; INTRACAUDAL; PERINEURAL at 06:55

## 2020-12-23 RX ADMIN — Medication 500 MG: at 07:20

## 2020-12-23 RX ADMIN — PROPOFOL 200 MG: 10 INJECTION, EMULSION INTRAVENOUS at 08:05

## 2020-12-23 ASSESSMENT — PULMONARY FUNCTION TESTS
PIF_VALUE: 0
PIF_VALUE: 2
PIF_VALUE: 4
PIF_VALUE: 4
PIF_VALUE: 15
PIF_VALUE: 2
PIF_VALUE: 3
PIF_VALUE: 15
PIF_VALUE: 1
PIF_VALUE: 10
PIF_VALUE: 10
PIF_VALUE: 2
PIF_VALUE: 2
PIF_VALUE: 4
PIF_VALUE: 1
PIF_VALUE: 4
PIF_VALUE: 0
PIF_VALUE: 15
PIF_VALUE: 2
PIF_VALUE: 10
PIF_VALUE: 2
PIF_VALUE: 15
PIF_VALUE: 2
PIF_VALUE: 1
PIF_VALUE: 11
PIF_VALUE: 15
PIF_VALUE: 4

## 2020-12-23 ASSESSMENT — PAIN - FUNCTIONAL ASSESSMENT
PAIN_FUNCTIONAL_ASSESSMENT: 0-10
PAIN_FUNCTIONAL_ASSESSMENT: PREVENTS OR INTERFERES SOME ACTIVE ACTIVITIES AND ADLS

## 2020-12-23 ASSESSMENT — PAIN SCALES - GENERAL
PAINLEVEL_OUTOF10: 0
PAINLEVEL_OUTOF10: 3
PAINLEVEL_OUTOF10: 0

## 2020-12-23 NOTE — OP NOTE
Operative Note      Patient: Travon Kahn  YOB: 1955  MRN: [id number]    Date of Procedure: 12/23/2020    Pre-Op Diagnosis: GANGREEN 5TH DIGIT    Post-Op Diagnosis: Same       Procedure(s):  PARTIAL FIFTH DIGIT AMPUTATION LEFT FOOT    Surgeon(s):  Zackary Will DPM    Assistant:   Surgical Assistant: Daryle Fern    Anesthesia: General    Estimated Blood Loss (mL): Minimal    Complications: None    Specimens:   ID Type Source Tests Collected by Time Destination   1 :  Bone Bone CULTURE, SURGICAL Zackary Will DPM 12/23/2020 0820        Implants:  * No implants in log *      Drains: * No LDAs found *    Findings: small amount of purulence noted at the tip of the digit    Detailed Description of Procedure:   Dictated:  [id number]    Electronically signed by Zackary Will DPM on 12/23/2020 at 8:40 AM

## 2020-12-23 NOTE — OP NOTE
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 PROCEDURE PERFORMED:  Partial 5th digit amputation, left foot. SURGEON:  Abbi Conley DPM    ANESTHESIA:  General.    HEMOSTASIS:  No ankle tourniquet was used. ESTIMATED BLOOD LOSS:  Minimal.    OPERATIVE PROCEDURE:  The patient was brought to the operating room,  placed on the operating table in supine position and then placed under  general anesthesia. Left foot and ankle were then scrubbed and draped  in usual sterile aseptic manner. Attention was directed at the distal  tip of the 5th digit, where the patient is noted to have amputation. Two semi-elliptical incisions were made, fish-mouth type of incision. The digit was disarticulated at the DIPJ and sent to micro for cultures  due to small amount of purulence noted at the tip. Proximally, no  purulent drainage or signs of infection were noted. Some bleeding was  noted. This would be less than 50% of the normal bleeding. The middle  phalanx was then disarticulated at the PIPJ and removed from the  surgical field. Next, the incision was evaluated, making sure that only  healthy tissue remained, and the incision was then reapproximated using  3-0 Vicryl sutures for subcutaneous tissue and 3-0 nylon sutures for  skin edges. Next, mildly compressive dressing consisting of Xeroform,  4x4, Ashley, and Ace were applied to the left foot and ankle.   The patient tolerated anesthesia and procedure well and left the operating  room with vital signs stable and neurovascular status intact.       Bob Hernandez DPM    D: 12/23/2020 8:46:31       T: 12/23/2020 11:36:18     [email or web address]  Job#: [id number]     Doc#: [id number]    CC:

## 2020-12-23 NOTE — ANESTHESIA POSTPROCEDURE EVALUATION
Department of Anesthesiology  Postprocedure Note    Patient: Ed Molina  MRN: [id number]  YOB: 1955  Date of evaluation: 12/23/2020  Time:  9:43 AM     Procedure Summary     Date: 12/23/20 Room / Location: [facility] EG OR 03 / [facility]    Anesthesia Start: 0800 Anesthesia Stop: 3150    Procedure: PARTIAL FIFTH DIGIT AMPUTATION LEFT FOOT (Left ) Diagnosis: Suzan Bermudez 5TH DIGIT AMP)    Surgeons: Elise Kauffman DPM Responsible Provider: Shanelle Kraus MD    Anesthesia Type: general ASA Status: 3          Anesthesia Type: general    Yrn Phase I: Yrn Score: 10    Yrn Phase II: Yrn Score: 10    Last vitals: Reviewed and per EMR flowsheets.        Anesthesia Post Evaluation    Comments: Postoperative Anesthesia Note    Name:    Ed Molina  MRN:      [id number]    Patient Vitals in the past 12 hrs:  12/23/20 0905, BP:(!) 162/74, Temp:98.5 °F (36.9 °C), Temp src:Temporal, Pulse:81, Resp:16, SpO2:97 %  12/23/20 0850, BP:(!) 174/90, Pulse:80, Resp:16, SpO2:98 %  12/23/20 0845, BP:(!) 159/87, Pulse:81, Resp:16, SpO2:99 %  12/23/20 0840, BP:(!) 161/78, Pulse:79, Resp:16, SpO2:99 %  12/23/20 0834, BP:(!) 148/71, Temp:98.5 °F (36.9 °C), Temp src:Temporal, Pulse:78, Resp:16, SpO2:99 %  12/23/20 0630, BP:134/67, Temp:97 °F (36.1 °C), Temp src:Temporal, Pulse:77, Resp:16, SpO2:98 %  12/23/20 0616, Height:5' 1\" (1.549 m), Weight:170 lb (77.1 kg)     LABS:    CBC  No results found for: WBC, HGB, HCT, PLT  RENAL  No results found for: NA, K, CL, CO2, BUN, CREATININE, GLUCOSE  COAGS  No results found for: PROTIME, INR, APTT    Intake & Output:  @36OCZN@    Nausea & Vomiting:  No    Level of Consciousness:  Awake    Pain Assessment:  Adequate analgesia    Anesthesia Complications:  No apparent anesthetic complications    SUMMARY      Vital signs stable OK to discharge from Stage I post anesthesia care.   Care transferred from Anesthesiology department on discharge from perioperative area

## 2020-12-23 NOTE — PROGRESS NOTES
Incision completed and Dr. Tomlinson Staff remarked there was some noted puss at the base of the toe. Bone culture to be completed.

## 2020-12-23 NOTE — ANESTHESIA PRE PROCEDURE
Department of Anesthesiology  Preprocedure Note       Name:  Gregoria Island   Age:  76 y.o.  :  1955                                          MRN:  [id number]         Date:  2020      Surgeon: Karin Yadav):  Tamar Buckner DPM    Procedure: Procedure(s):  PARTIAL FIFTH DIGIT AMPUTATION LEFT FOOT    Medications prior to admission:   Prior to Admission medications    Medication Sig Start Date End Date Taking? Authorizing Provider   HYDROcodone-acetaminophen (NORCO) 5-325 MG per tablet Take 1 tablet by mouth every 6 hours as needed for Pain.    Yes Historical Provider, MD   Cholecalciferol (VITAMIN D3 PO) Take by mouth    Historical Provider, MD   albuterol sulfate HFA (PROAIR HFA) 108 (90 Base) MCG/ACT inhaler Inhale 2 puffs into the lungs every 6 hours as needed for Wheezing or Shortness of Breath 8/3/20   Stanley Heart MD   lisinopril-hydroCHLOROthiazide (PRINZIDE;ZESTORETIC) 10-12.5 MG per tablet Take 1 tablet by mouth daily    Historical Provider, MD   zinc 50 MG CAPS Take by mouth daily    Historical Provider, MD   Ascorbic Acid (VITAMIN C) 1000 MG tablet Take 1,000 mg by mouth daily    Historical Provider, MD   Multiple Vitamins-Minerals (MULTIVITAMIN ADULTS PO) Take by mouth    Historical Provider, MD   ELDERBERRY PO Take by mouth    Historical Provider, MD   clopidogrel (PLAVIX) 75 MG tablet Take 75 mg by mouth daily    Historical Provider, MD   isosorbide mononitrate (IMDUR) 60 MG CR tablet Take 120 mg by mouth daily     Historical Provider, MD   aspirin 325 MG tablet Take 325 mg by mouth daily    Historical Provider, MD       Current medications:    Current Facility-Administered Medications   Medication Dose Route Frequency Provider Last Rate Last Admin    vancomycin 1000 mg IVPB in 250 mL D5W addavial  1,000 mg Intravenous Once Edna Valverde DPM        lactated ringers infusion   Intravenous Continuous Rubye Kawasaki, MD  sodium chloride flush 0.9 % injection 10 mL  10 mL Intravenous 2 times per day Ramonita Morales MD        sodium chloride flush 0.9 % injection 10 mL  10 mL Intravenous PRN Ramonita Morales MD        lidocaine PF 1 % injection 0.3 mL  0.3 mL Intradermal Once PRN Ramonita Morales MD           Allergies: Allergies   Allergen Reactions    Ibuprofen Nausea Only    Niacin And Related Other (See Comments)     Leg Cramps    Statins Other (See Comments)     Leg cramps      Zetia [Ezetimibe] Other (See Comments)     Leg Cramps       Problem List:  There is no problem list on file for this patient. Past Medical History:        Diagnosis Date    CAD (coronary artery disease) 7902-0132    5 cardiac stents    Hypertension     PONV (postoperative nausea and vomiting)        Past Surgical History:        Procedure Laterality Date    CORONARY ANGIOPLASTY WITH STENT PLACEMENT      5 stents from [id number]    EYE SURGERY Left 07/01/2016    cataract    EYE SURGERY Right 07/22/2016    cataract    FOOT SURGERY Bilateral     HAND SURGERY Left     Trigger finger    HYSTERECTOMY         Social History:    Social History     Tobacco Use    Smoking status: Current Every Day Smoker     Packs/day: 1.00     Years: 41.00     Pack years: 41.00     Types: Cigarettes    Smokeless tobacco: Never Used    Tobacco comment: 11/3/2020  1ppd   Substance Use Topics    Alcohol use:  No                                Ready to quit: Not Answered  Counseling given: Not Answered  Comment: 11/3/2020  1ppd      Vital Signs (Current):   Vitals:    12/18/20 1617 12/23/20 0616 12/23/20 0630   BP:   134/67   Pulse:   77   Resp:   16   Temp:   97 °F (36.1 °C)   TempSrc:   Temporal   SpO2:   98%   Weight: 170 lb (77.1 kg) 170 lb (77.1 kg)    Height: 5' 1\" (1.549 m) 5' 1\" (1.549 m)                                               BP Readings from Last 3 Encounters:   12/23/20 134/67   11/20/20 130/80   07/22/16 118/75 NPO Status: Time of last liquid consumption: 2230                        Time of last solid consumption: 2230                        Date of last liquid consumption: 12/22/20                        Date of last solid food consumption: 12/22/20    BMI:   Wt Readings from Last 3 Encounters:   12/23/20 170 lb (77.1 kg)   11/20/20 174 lb (78.9 kg)   07/24/20 170 lb (77.1 kg)     Body mass index is 32.12 kg/m². CBC: No results found for: WBC, RBC, HGB, HCT, MCV, RDW, PLT    CMP: No results found for: NA, K, CL, CO2, BUN, CREATININE, GFRAA, AGRATIO, LABGLOM, GLUCOSE, PROT, CALCIUM, BILITOT, ALKPHOS, AST, ALT    POC Tests: No results for input(s): POCGLU, POCNA, POCK, POCCL, POCBUN, POCHEMO, POCHCT in the last 72 hours. Coags: No results found for: PROTIME, INR, APTT    HCG (If Applicable): No results found for: PREGTESTUR, PREGSERUM, HCG, HCGQUANT     ABGs: No results found for: PHART, PO2ART, AVT9IWO, CDU9XMB, BEART, C5VZSTIN     Type & Screen (If Applicable):  No results found for: LABABO, LABRH    Drug/Infectious Status (If Applicable):  No results found for: HIV, HEPCAB    COVID-19 Screening (If Applicable):   Lab Results   Component Value Date    COVID19 Not Detected 12/18/2020         Anesthesia Evaluation  Patient summary reviewed   history of anesthetic complications: PONV. Airway: Mallampati: II  TM distance: >3 FB   Neck ROM: full  Mouth opening: > = 3 FB Dental: normal exam         Pulmonary:Negative Pulmonary ROS and normal exam  breath sounds clear to auscultation  (+) asthma:                            Cardiovascular:Negative CV ROS  Exercise tolerance: good (>4 METS),   (+) hypertension:, CAD:, CABG/stent:,         Rhythm: regular  Rate: normal                    Neuro/Psych:   Negative Neuro/Psych ROS              GI/Hepatic/Renal: Neg GI/Hepatic/Renal ROS            Endo/Other: Negative Endo/Other ROS                    Abdominal:           Vascular: negative vascular ROS. Pre-Operative Diagnosis: GANGREEN 5TH DIGIT AMP    76 y.o.   BMI:  Body mass index is 32.12 kg/m². Vitals:    12/18/20 1617 12/23/20 0616 12/23/20 0630   BP:   134/67   Pulse:   77   Resp:   16   Temp:   97 °F (36.1 °C)   TempSrc:   Temporal   SpO2:   98%   Weight: 170 lb (77.1 kg) 170 lb (77.1 kg)    Height: 5' 1\" (1.549 m) 5' 1\" (1.549 m)        Allergies   Allergen Reactions    Ibuprofen Nausea Only    Niacin And Related Other (See Comments)     Leg Cramps    Statins Other (See Comments)     Leg cramps      Zetia [Ezetimibe] Other (See Comments)     Leg Cramps       Social History     Tobacco Use    Smoking status: Current Every Day Smoker     Packs/day: 1.00     Years: 41.00     Pack years: 41.00     Types: Cigarettes    Smokeless tobacco: Never Used    Tobacco comment: 11/3/2020  1ppd   Substance Use Topics    Alcohol use: No       LABS:    CBC  No results found for: WBC, HGB, HCT, PLT  RENAL  No results found for: NA, K, CL, CO2, BUN, CREATININE, GLUCOSE  COAGS  No results found for: PROTIME, INR, APTT          Anesthesia Plan      general     ASA 3     (I discussed with the patient the risks and benefits of PIV, anesthesia, IV Narcotics, PACU. All questions were answered the patient agrees with the plan and wishes to proceed)  Induction: intravenous.                           Nereyda Delong MD   12/23/2020

## 2020-12-23 NOTE — PROGRESS NOTES
Labwork has not arrived on fax from [facility] care, Dr Ameya Lyle aware, ok to proceed with surgery.

## 2020-12-28 LAB
ANAEROBIC CULTURE: ABNORMAL
CULTURE SURGICAL: ABNORMAL
GRAM STAIN RESULT: ABNORMAL
ORGANISM: ABNORMAL

## 2021-02-05 ENCOUNTER — HOSPITAL ENCOUNTER (OUTPATIENT)
Dept: CT IMAGING | Age: 66
Discharge: HOME | End: 2021-02-05
Payer: MEDICARE

## 2021-02-05 DIAGNOSIS — R91.1 PULMONARY NODULE: ICD-10-CM

## 2021-02-05 PROCEDURE — 71250 CT THORAX DX C-: CPT

## 2021-02-10 ENCOUNTER — VIRTUAL VISIT (OUTPATIENT)
Dept: PULMONOLOGY | Age: 66
End: 2021-02-10
Payer: MEDICARE

## 2021-02-10 DIAGNOSIS — J43.9 PULMONARY EMPHYSEMA, UNSPECIFIED EMPHYSEMA TYPE (HCC): ICD-10-CM

## 2021-02-10 DIAGNOSIS — R91.1 PULMONARY NODULE: Primary | ICD-10-CM

## 2021-02-10 PROCEDURE — 99214 OFFICE O/P EST MOD 30 MIN: CPT | Performed by: INTERNAL MEDICINE

## 2021-02-10 NOTE — PROGRESS NOTES
[facility]                                                          TELEHEALTH EVALUATION: Service performed was Audio/Visual (During [facility]-78 public health emergency) and not a face-to-face visit           CHIEF COMPLAINT: follow up CT        HPI:   CT chest reviewed by me and noted below. Results were dicussed with patient and multiple good questions were answered. Doing good   Some cough with clear sputum   No hemoptysis   Uses Albuterol once a day   Smoking 1 ppd         Past Medical History:   Diagnosis Date    CAD (coronary artery disease) 9668-2795    5 cardiac stents    Hypertension     PONV (postoperative nausea and vomiting)        Past Surgical History:        Procedure Laterality Date    CORONARY ANGIOPLASTY WITH STENT PLACEMENT      5 stents from 4148-0806    EYE SURGERY Left 07/01/2016    cataract    EYE SURGERY Right 07/22/2016    cataract    FOOT AMPUTATION Left 12/23/2020    PARTIAL FIFTH DIGIT AMPUTATION LEFT FOOT performed by Miles Henriquez DPM at [address] [place] [address] Bilateral     FOOT SURGERY Left     HAND SURGERY Left     Trigger finger    HYSTERECTOMY         Allergies:  is allergic to ibuprofen; niacin and related; statins; and zetia [ezetimibe]. Social History:    TOBACCO:   reports that she has been smoking cigarettes. She has a 41.00 pack-year smoking history. She has never used smokeless tobacco.  ETOH:   reports no history of alcohol use.       Family History:   No lung cancer       Current Medications:    Current Outpatient Medications:     HYDROcodone-acetaminophen (NORCO) 5-325 MG per tablet, Take 1 tablet by mouth every 6 hours as needed for Pain., Disp: , Rfl:     Cholecalciferol (VITAMIN D3 PO), Take by mouth, Disp: , Rfl:     albuterol sulfate HFA (PROAIR HFA) 108 (90 Base) MCG/ACT inhaler, Inhale 2 puffs into the lungs every 6 hours as needed for Wheezing or Shortness of Breath, Disp: 1 Inhaler, Rfl: 6    lisinopril-hydroCHLOROthiazide (PRINZIDE;ZESTORETIC) 10-12.5 MG per tablet, Take 1 tablet by mouth daily, Disp: , Rfl:     zinc 50 MG CAPS, Take by mouth daily, Disp: , Rfl:     Ascorbic Acid (VITAMIN C) 1000 MG tablet, Take 1,000 mg by mouth daily, Disp: , Rfl:     Multiple Vitamins-Minerals (MULTIVITAMIN ADULTS PO), Take by mouth, Disp: , Rfl:     ELDERBERRY PO, Take by mouth, Disp: , Rfl:     clopidogrel (PLAVIX) 75 MG tablet, Take 75 mg by mouth daily, Disp: , Rfl:     isosorbide mononitrate (IMDUR) 60 MG CR tablet, Take 120 mg by mouth daily , Disp: , Rfl:     aspirin 325 MG tablet, Take 325 mg by mouth daily, Disp: , Rfl:       Objective:   PHYSICAL EXAM:    There were no vitals taken for this visit.' on RA  Mallampati class IV. Constitutional:  No acute distress. Appears well developed and nourished. Eyes: No sclera icterus. EOM intact. No visible discharge. HENT: Head is normocephalic and atraumatic. Mucus membranes are moist and the tongue appears normal. Normal appearing nose. External Ears normal.   Neck: No visualized mass. Darlynn Loser is midline   Resp: No accessory muscle use. Respiratory effort normal. No visualized signs of difficulty breathing or respiratory distress. Cardiovascular: No LE edema. Musculoskeletal: Normal gait with no signs of ataxia. Normal range of motion of the neck. Skin: No significant exanthematous lesions or discoloration noted on facial skin    Neuro: Awake. Alert. Able to follow commands. No facial asymmetry. No gaze palsy. Psych: No agitation. Normal affect. No hallucinations. Oriented to person/time/place. No anxiety. Normal judgement and insight.                 DATA reviewed by me:   CT chest 7/14/2020 imaging reviewed by me and showed  KAMINI 1 cm nodule  Mild to moderate emphysema       PET scan 7/24/2020 imaging reviewed by me and showed  No suspicious hypermetabolic activity with a lung nodule  Heterogeneous appearance of the thyroid gland with nodule and calcifications  Infrarenal aorti aneurysm 3 cm    CT chest 10/26/2020 imaging reviewed by me and showed  No definitive change in left upper lobe nodule    CT chest 2/5/2021 imaging reviewed by me and showed  1. No acute intrapulmonary findings. 2. Stable mild-to-moderate emphysema. 3. Stable size and appearance of a 9 mm irregular nodule within the left lung  apex. Assessment:       · Pulmonary emphysema  · KAMINI 1 cm nodule on CT 7/14/2020. No metabolic activities on PET scan 7/24/2020. Stable on repeat CT 2/5/2021  · Thyroid nodule-  Followed by PCP   · 48 pack year smoking    · CAD post stenting       Plan:       Options of Bx, resection and watchful waiting were discussed with patient. I recommend radiographic follow up CT chest August 2021. Patient feels comfortable with this approach. Continue Albuterol 2 puffs Q4-6 hrs PRN  Advised to to get Pneumococcal vaccine and influenza vaccine  Smoking cessation counseling   Interested herself and her  in Covid vaccine- will try to schedule   Follow up after CT                  [place] is a 78 y.o. female being evaluated by a Virtual Visit (video visit) encounter to address concerns as mentioned above. A caregiver was present when appropriate. Due to this being a TeleHealth encounter (During [facility]-04 public health emergency), evaluation of the following organ systems was limited: Vitals/Constitutional/EENT/Resp/CV/GI//MS/Neuro/Skin/Heme-Lymph-Imm. Pursuant to the emergency declaration under the [facility], [address] [place] [address] authority and the [employer] Act, this Virtual Visit was conducted with patient's (and/or legal guardian's) consent, to reduce the patient's risk of exposure to COVID-19 and provide necessary medical care.   The patient (and/or legal guardian) has also been advised to contact this office for worsening conditions or problems, and seek emergency medical treatment and/or call 911 if deemed necessary. Services were provided through a video synchronous discussion virtually to substitute for in-person clinic visit. Patient was located in her home, provider was located in his office. --Claudette Arvizu MD on 2/10/2021 at 8:45 AM    An electronic signature was used to authenticate this note.

## 2021-02-17 ENCOUNTER — IMMUNIZATION (OUTPATIENT)
Dept: PRIMARY CARE CLINIC | Age: 66
End: 2021-02-17
Payer: MEDICARE

## 2021-02-17 PROCEDURE — 91301 COVID-19, MODERNA VACCINE 100MCG/0.5ML DOSE: CPT | Performed by: FAMILY MEDICINE

## 2021-02-17 PROCEDURE — 0011A COVID-19, MODERNA VACCINE 100MCG/0.5ML DOSE: CPT | Performed by: FAMILY MEDICINE

## 2021-03-17 ENCOUNTER — IMMUNIZATION (OUTPATIENT)
Dept: PRIMARY CARE CLINIC | Age: 66
End: 2021-03-17
Payer: MEDICARE

## 2021-03-17 PROCEDURE — 0012A COVID-19, MODERNA VACCINE 100MCG/0.5ML DOSE: CPT | Performed by: FAMILY MEDICINE

## 2021-03-17 PROCEDURE — 91301 COVID-19, MODERNA VACCINE 100MCG/0.5ML DOSE: CPT | Performed by: FAMILY MEDICINE

## 2021-08-06 ENCOUNTER — HOSPITAL ENCOUNTER (OUTPATIENT)
Dept: CT IMAGING | Age: 66
Discharge: HOME | End: 2021-08-06
Payer: MEDICARE

## 2021-08-06 DIAGNOSIS — R91.1 PULMONARY NODULE: ICD-10-CM

## 2021-08-06 PROCEDURE — 71250 CT THORAX DX C-: CPT

## 2021-08-09 ENCOUNTER — OFFICE VISIT (OUTPATIENT)
Dept: PULMONOLOGY | Age: 66
End: 2021-08-09
Payer: MEDICARE

## 2021-08-09 VITALS
BODY MASS INDEX: 30.43 KG/M2 | WEIGHT: 161.2 LBS | TEMPERATURE: 96.6 F | OXYGEN SATURATION: 95 % | HEIGHT: 61 IN | RESPIRATION RATE: 18 BRPM | SYSTOLIC BLOOD PRESSURE: 140 MMHG | HEART RATE: 102 BPM | DIASTOLIC BLOOD PRESSURE: 72 MMHG

## 2021-08-09 DIAGNOSIS — R91.1 PULMONARY NODULE: ICD-10-CM

## 2021-08-09 DIAGNOSIS — J43.9 PULMONARY EMPHYSEMA, UNSPECIFIED EMPHYSEMA TYPE (HCC): Primary | ICD-10-CM

## 2021-08-09 DIAGNOSIS — Z72.0 TOBACCO ABUSE: ICD-10-CM

## 2021-08-09 PROCEDURE — 4004F PT TOBACCO SCREEN RCVD TLK: CPT | Performed by: INTERNAL MEDICINE

## 2021-08-09 PROCEDURE — G8417 CALC BMI ABV UP PARAM F/U: HCPCS | Performed by: INTERNAL MEDICINE

## 2021-08-09 PROCEDURE — 1090F PRES/ABSN URINE INCON ASSESS: CPT | Performed by: INTERNAL MEDICINE

## 2021-08-09 PROCEDURE — 3023F SPIROM DOC REV: CPT | Performed by: INTERNAL MEDICINE

## 2021-08-09 PROCEDURE — 1123F ACP DISCUSS/DSCN MKR DOCD: CPT | Performed by: INTERNAL MEDICINE

## 2021-08-09 PROCEDURE — 3017F COLORECTAL CA SCREEN DOC REV: CPT | Performed by: INTERNAL MEDICINE

## 2021-08-09 PROCEDURE — G8427 DOCREV CUR MEDS BY ELIG CLIN: HCPCS | Performed by: INTERNAL MEDICINE

## 2021-08-09 PROCEDURE — 99214 OFFICE O/P EST MOD 30 MIN: CPT | Performed by: INTERNAL MEDICINE

## 2021-08-09 PROCEDURE — G8400 PT W/DXA NO RESULTS DOC: HCPCS | Performed by: INTERNAL MEDICINE

## 2021-08-09 PROCEDURE — 4040F PNEUMOC VAC/ADMIN/RCVD: CPT | Performed by: INTERNAL MEDICINE

## 2021-08-09 PROCEDURE — G8926 SPIRO NO PERF OR DOC: HCPCS | Performed by: INTERNAL MEDICINE

## 2021-08-09 RX ORDER — ALBUTEROL SULFATE 90 UG/1
2 AEROSOL, METERED RESPIRATORY (INHALATION) EVERY 6 HOURS PRN
Qty: 1 INHALER | Refills: 6 | Status: SHIPPED | OUTPATIENT
Start: 2021-08-09 | End: 2022-02-08 | Stop reason: SDUPTHER

## 2021-08-09 RX ORDER — GUAIFENESIN 600 MG/1
600 TABLET, EXTENDED RELEASE ORAL 2 TIMES DAILY
Qty: 60 TABLET | Refills: 5 | Status: SHIPPED | OUTPATIENT
Start: 2021-08-09 | End: 2022-02-05

## 2021-08-09 RX ORDER — AZITHROMYCIN 250 MG/1
TABLET, FILM COATED ORAL
Qty: 6 TABLET | Refills: 0 | Status: SHIPPED | OUTPATIENT
Start: 2021-08-09 | End: 2022-09-13

## 2021-08-09 NOTE — PROGRESS NOTES
mouth daily, Disp: , Rfl:     zinc 50 MG CAPS, Take by mouth daily, Disp: , Rfl:     Ascorbic Acid (VITAMIN C) 1000 MG tablet, Take 1,000 mg by mouth daily, Disp: , Rfl:     Multiple Vitamins-Minerals (MULTIVITAMIN ADULTS PO), Take by mouth, Disp: , Rfl:     ELDERBERRY PO, Take by mouth, Disp: , Rfl:     clopidogrel (PLAVIX) 75 MG tablet, Take 75 mg by mouth daily, Disp: , Rfl:     isosorbide mononitrate (IMDUR) 60 MG CR tablet, Take 120 mg by mouth daily , Disp: , Rfl:     aspirin 325 MG tablet, Take 325 mg by mouth daily, Disp: , Rfl:       Objective:   PHYSICAL EXAM:    BP (!) 140/72   Pulse 102   Temp 96.6 °F (35.9 °C)   Resp 18   Ht 5' 1\" (1.549 m)   Wt 161 lb 3.2 oz (73.1 kg)   SpO2 95%   BMI 30.46 kg/m²   Gen: No distress. Eyes: PERRL. No sclera icterus. No conjunctival injection. ENT: No discharge. Pharynx clear. Neck: Trachea midline. No obvious mass. Resp: No accessory muscle use. No crackles. No wheezes. No rhonchi. No dullness on percussion. Good air entry. CV: Regular rate. Regular rhythm. No murmur or rub. No edema. GI: Non-tender. Non-distended. No hernia. Skin: Warm and dry. No nodule on exposed extremities. Lymph: No cervical LAD. No supraclavicular LAD. M/S: No cyanosis. No joint deformity. No clubbing. Neuro: Awake. Alert. Moves all four extremities. Psych: Oriented x 3. No anxiety. DATA reviewed by me:   CT chest 7/14/2020   KAMINI 1 cm nodule  Mild to moderate emphysema       PET scan 7/24/2020  No suspicious hypermetabolic activity with a lung nodule  Heterogeneous appearance of the thyroid gland with nodule and calcifications  Infrarenal aorti aneurysm 3 cm    CT chest 10/26/2020   No definitive change in left upper lobe nodule    CT chest 2/5/2021 imaging reviewed by me and showed  1. No acute intrapulmonary findings. 2. Stable mild-to-moderate emphysema. 3. Stable size and appearance of a 9 mm irregular nodule within the left lung  apex. CT chest 8/6/2021 imaging reviewed by me and showed  Stable spiculated left upper lobe nodule      Assessment:       · Pulmonary emphysema- not controlled   · KAMINI 1 cm nodule on CT 7/14/2020. No metabolic activities on PET scan 7/24/2020. Stable on repeat CT 8/6/2021. · Thyroid nodule-  Followed by PCP   · 48 pack year smoking    · CAD post stenting       Plan:       Options of Bx, resection and watchful waiting were discussed with patient. I recommend radiographic follow up CT chest February 2022. Patient feels comfortable with this approach.    Continue Albuterol 2 puffs Q4-6 hrs PRN  Trial of Spiriva Respimat: Two inhalations (5 mcg) once daily (maximum: 2 inhalations per 24 hours)  Trial of Guaifenesin (Mucinex) Extended-release tablet 600 mg every 12 hours as needed  Z-Marshall oral   Advised to to get Covid, Pneumococcal vaccine and influenza vaccine  Smoking cessation counseling   Follow up after CT

## 2021-08-10 ENCOUNTER — TELEPHONE (OUTPATIENT)
Dept: PULMONOLOGY | Age: 66
End: 2021-08-10

## 2021-08-10 RX ORDER — UMECLIDINIUM 62.5 UG/1
AEROSOL, POWDER ORAL
Qty: 1 EACH | Refills: 5 | Status: SHIPPED | OUTPATIENT
Start: 2021-08-10 | End: 2022-09-13

## 2022-02-07 ENCOUNTER — HOSPITAL ENCOUNTER (OUTPATIENT)
Dept: CT IMAGING | Age: 67
Discharge: HOME | End: 2022-02-07
Payer: MEDICARE

## 2022-02-07 DIAGNOSIS — R91.1 PULMONARY NODULE: ICD-10-CM

## 2022-02-07 PROCEDURE — 71250 CT THORAX DX C-: CPT

## 2022-02-08 ENCOUNTER — OFFICE VISIT (OUTPATIENT)
Dept: PULMONOLOGY | Age: 67
End: 2022-02-08
Payer: MEDICARE

## 2022-02-08 VITALS
BODY MASS INDEX: 32 KG/M2 | HEART RATE: 91 BPM | OXYGEN SATURATION: 97 % | SYSTOLIC BLOOD PRESSURE: 136 MMHG | DIASTOLIC BLOOD PRESSURE: 80 MMHG | HEIGHT: 61 IN | WEIGHT: 169.5 LBS

## 2022-02-08 DIAGNOSIS — R91.1 PULMONARY NODULE: ICD-10-CM

## 2022-02-08 DIAGNOSIS — J43.9 PULMONARY EMPHYSEMA, UNSPECIFIED EMPHYSEMA TYPE (HCC): Primary | ICD-10-CM

## 2022-02-08 DIAGNOSIS — Z72.0 TOBACCO ABUSE: ICD-10-CM

## 2022-02-08 DIAGNOSIS — R05.3 CHRONIC COUGH: ICD-10-CM

## 2022-02-08 PROCEDURE — G8417 CALC BMI ABV UP PARAM F/U: HCPCS | Performed by: INTERNAL MEDICINE

## 2022-02-08 PROCEDURE — G8400 PT W/DXA NO RESULTS DOC: HCPCS | Performed by: INTERNAL MEDICINE

## 2022-02-08 PROCEDURE — G8427 DOCREV CUR MEDS BY ELIG CLIN: HCPCS | Performed by: INTERNAL MEDICINE

## 2022-02-08 PROCEDURE — 1090F PRES/ABSN URINE INCON ASSESS: CPT | Performed by: INTERNAL MEDICINE

## 2022-02-08 PROCEDURE — 4004F PT TOBACCO SCREEN RCVD TLK: CPT | Performed by: INTERNAL MEDICINE

## 2022-02-08 PROCEDURE — 3017F COLORECTAL CA SCREEN DOC REV: CPT | Performed by: INTERNAL MEDICINE

## 2022-02-08 PROCEDURE — G8484 FLU IMMUNIZE NO ADMIN: HCPCS | Performed by: INTERNAL MEDICINE

## 2022-02-08 PROCEDURE — 99214 OFFICE O/P EST MOD 30 MIN: CPT | Performed by: INTERNAL MEDICINE

## 2022-02-08 PROCEDURE — 4040F PNEUMOC VAC/ADMIN/RCVD: CPT | Performed by: INTERNAL MEDICINE

## 2022-02-08 PROCEDURE — 3023F SPIROM DOC REV: CPT | Performed by: INTERNAL MEDICINE

## 2022-02-08 PROCEDURE — 1123F ACP DISCUSS/DSCN MKR DOCD: CPT | Performed by: INTERNAL MEDICINE

## 2022-02-08 RX ORDER — ALBUTEROL SULFATE 90 UG/1
2 AEROSOL, METERED RESPIRATORY (INHALATION) EVERY 6 HOURS PRN
Qty: 18 G | Refills: 5 | Status: SHIPPED | OUTPATIENT
Start: 2022-02-08

## 2022-02-08 RX ORDER — GUAIFENESIN 600 MG/1
600 TABLET, EXTENDED RELEASE ORAL 2 TIMES DAILY
Qty: 60 TABLET | Refills: 2 | Status: SHIPPED | OUTPATIENT
Start: 2022-02-08 | End: 2022-05-09

## 2022-02-08 RX ORDER — BUDESONIDE AND FORMOTEROL FUMARATE DIHYDRATE 80; 4.5 UG/1; UG/1
2 AEROSOL RESPIRATORY (INHALATION) 2 TIMES DAILY
Qty: 10.2 G | Refills: 2 | Status: SHIPPED | OUTPATIENT
Start: 2022-02-08 | End: 2022-09-13

## 2022-02-08 NOTE — PROGRESS NOTES
[facility]                                                 CHIEF COMPLAINT: follow up CT        HPI:   CT chest reviewed by me and noted below. Results were dicussed with patient and multiple good questions were answered. Doing good overall   Still with cough, chronic for past 1 year, every day   Clear to cloudy sputum   Uses Allegra OTC and helping   Uses Albuterol 0-1 times/day   Smoking 1 ppd         Past Medical History:   Diagnosis Date    CAD (coronary artery disease) [id number]    5 cardiac stents    Hypertension     PONV (postoperative nausea and vomiting)        Past Surgical History:        Procedure Laterality Date    CORONARY ANGIOPLASTY WITH STENT PLACEMENT      5 stents from [id number]    EYE SURGERY Left 07/01/2016    cataract    EYE SURGERY Right 07/22/2016    cataract    FOOT AMPUTATION Left 12/23/2020    PARTIAL FIFTH DIGIT AMPUTATION LEFT FOOT performed by Shun Godinez DPM at [address] [place] [address] Bilateral     FOOT SURGERY Left     HAND SURGERY Left     Trigger finger    HYSTERECTOMY         Allergies:  is allergic to ibuprofen, niacin and related, statins, and zetia [ezetimibe]. Social History:    TOBACCO:   reports that she has been smoking cigarettes. She has a 41.00 pack-year smoking history. She has never used smokeless tobacco.  ETOH:   reports no history of alcohol use.       Family History:   No lung cancer       Current Medications:    Current Outpatient Medications:     albuterol sulfate HFA (PROAIR HFA) 108 (90 Base) MCG/ACT inhaler, Inhale 2 puffs into the lungs every 6 hours as needed for Wheezing or Shortness of Breath, Disp: 1 Inhaler, Rfl: 6    HYDROcodone-acetaminophen (NORCO) 5-325 MG per tablet, Take 1 tablet by mouth every 6 hours as needed for Pain., Disp: , Rfl:     Cholecalciferol (VITAMIN D3 PO), Take by mouth , Disp: , Rfl:     lisinopril-hydroCHLOROthiazide (PRINZIDE;ZESTORETIC) 10-12.5 MG per tablet, Take 1 tablet by mouth daily, Disp: , Rfl:     zinc 50 MG CAPS, Take by mouth daily , Disp: , Rfl:     Multiple Vitamins-Minerals (MULTIVITAMIN ADULTS PO), Take by mouth, Disp: , Rfl:     ELDERBERRY PO, Take by mouth , Disp: , Rfl:     clopidogrel (PLAVIX) 75 MG tablet, Take 75 mg by mouth daily, Disp: , Rfl:     isosorbide mononitrate (IMDUR) 60 MG CR tablet, Take 120 mg by mouth daily , Disp: , Rfl:     aspirin 325 MG tablet, Take 325 mg by mouth daily, Disp: , Rfl:     Umeclidinium Bromide (INCRUSE ELLIPTA) 62.5 MCG/INH AEPB, 1 inhalation daily (Patient not taking: Reported on 2/8/2022), Disp: 1 each, Rfl: 5    azithromycin (ZITHROMAX) 250 MG tablet, 500mg on day 1 followed by 250mg on days 2 - 5 (Patient not taking: Reported on 2/8/2022), Disp: 6 tablet, Rfl: 0    Ascorbic Acid (VITAMIN C) 1000 MG tablet, Take 1,000 mg by mouth daily (Patient not taking: Reported on 8/9/2021), Disp: , Rfl:       Objective:   PHYSICAL EXAM:    /80 (Site: Left Upper Arm, Position: Sitting, Cuff Size: Medium Adult)   Pulse 91   Ht 5' 1\" (1.549 m)   Wt 169 lb 8 oz (76.9 kg)   SpO2 97% Comment: RA  BMI 32.03 kg/m²   Gen: No distress. Eyes: PERRL. No sclera icterus. No conjunctival injection. ENT: No discharge. Pharynx clear. Neck: Trachea midline. No obvious mass. Resp: No accessory muscle use. No crackles. No wheezes. No rhonchi. No dullness on percussion. Good air entry. CV: Regular rate. Regular rhythm. No murmur or rub. No edema. GI: Non-tender. Non-distended. No hernia. Skin: Warm and dry. No nodule on exposed extremities. Lymph: No cervical LAD. No supraclavicular LAD. M/S: No cyanosis. No joint deformity. No clubbing. Neuro: Awake. Alert. Moves all four extremities. Psych: Oriented x 3. No anxiety.                  DATA reviewed by me:   CT chest 7/14/2020   KAMINI 1 cm nodule  Mild to moderate emphysema       PET scan 7/24/2020  No suspicious hypermetabolic activity with a lung nodule  Heterogeneous appearance of the thyroid gland with nodule and calcifications  Infrarenal aorti aneurysm 3 cm    CT chest 10/26/2020   No definitive change in left upper lobe nodule    CT chest 2/5/2021 imaging reviewed by me and showed  1. No acute intrapulmonary findings. 2. Stable mild-to-moderate emphysema. 3. Stable size and appearance of a 9 mm irregular nodule within the left lung  apex. CT chest 8/6/2021 imaging reviewed by me and showed  Stable spiculated left upper lobe nodule    CT chest 2/7/2022 imaging reviewed by me and showed  1. No acute intrapulmonary findings. 2. Stable chronic emphysema. 3. Multiple stable scattered pulmonary nodules, including an 11 x 8 mm  spiculated nodule within the left lung apex, unchanged dating back to  07/15/2020.    4. Stable 3.0 cm aneurysm of the infrarenal abdominal aorta. Potential  further follow-up of this abnormality is as advised below. Assessment:       · Pulmonary emphysema  · Chronic cough- uncontrolled emphysema   · KAMINI 1 cm nodule on CT 7/14/2020. No metabolic activities on PET scan 7/24/2020.   Stable on repeat CT 2/8/2022  · Thyroid nodule-  Followed by PCP   · 48 pack year smoking    · CAD post stenting   · Not jasen to afford Spiriva or Incruise       Plan:       Repeat CT chest February 2023  Continue Albuterol 2 puffs Q4-6 hrs PRN  Trial of Symbicort 80/4.5 2 puffs BID   Mucinex twice daily as needed  Advised to to get Covid, Pneumococcal vaccine and influenza vaccine  Smoking cessation counseling-  Advised at least to cut down on number of cigarettes  Follow up in 6 months or sooner if needed

## 2022-02-08 NOTE — PATIENT INSTRUCTIONS
Remember to bring all pulmonary medications to your next appointment with the office. Please keep all of your future appointments scheduled by [facility] office. Out of respect for other patients and providers, you may be asked to reschedule your appointment if you arrive later than your scheduled appointment time. Appointments cancelled less than 24hrs in advance will be considered a no show. Patients with three missed appointments within 1 year or four missed appointments within 2 years can be dismissed from the practice.

## 2022-09-13 ENCOUNTER — OFFICE VISIT (OUTPATIENT)
Dept: PULMONOLOGY | Age: 67
End: 2022-09-13
Payer: MEDICARE

## 2022-09-13 VITALS
WEIGHT: 164 LBS | OXYGEN SATURATION: 96 % | HEIGHT: 61 IN | RESPIRATION RATE: 18 BRPM | DIASTOLIC BLOOD PRESSURE: 64 MMHG | HEART RATE: 87 BPM | BODY MASS INDEX: 30.96 KG/M2 | SYSTOLIC BLOOD PRESSURE: 122 MMHG

## 2022-09-13 DIAGNOSIS — Z23 NEED FOR PNEUMOCOCCAL VACCINATION: ICD-10-CM

## 2022-09-13 DIAGNOSIS — Z23 NEED FOR INFLUENZA VACCINATION: Primary | ICD-10-CM

## 2022-09-13 DIAGNOSIS — J43.9 PULMONARY EMPHYSEMA, UNSPECIFIED EMPHYSEMA TYPE (HCC): ICD-10-CM

## 2022-09-13 DIAGNOSIS — R91.1 LUNG NODULE: ICD-10-CM

## 2022-09-13 PROCEDURE — 1090F PRES/ABSN URINE INCON ASSESS: CPT | Performed by: INTERNAL MEDICINE

## 2022-09-13 PROCEDURE — 1123F ACP DISCUSS/DSCN MKR DOCD: CPT | Performed by: INTERNAL MEDICINE

## 2022-09-13 PROCEDURE — G0008 ADMIN INFLUENZA VIRUS VAC: HCPCS | Performed by: INTERNAL MEDICINE

## 2022-09-13 PROCEDURE — 90694 VACC AIIV4 NO PRSRV 0.5ML IM: CPT | Performed by: INTERNAL MEDICINE

## 2022-09-13 PROCEDURE — 90677 PCV20 VACCINE IM: CPT | Performed by: INTERNAL MEDICINE

## 2022-09-13 PROCEDURE — 99214 OFFICE O/P EST MOD 30 MIN: CPT | Performed by: INTERNAL MEDICINE

## 2022-09-13 PROCEDURE — 3017F COLORECTAL CA SCREEN DOC REV: CPT | Performed by: INTERNAL MEDICINE

## 2022-09-13 PROCEDURE — G8417 CALC BMI ABV UP PARAM F/U: HCPCS | Performed by: INTERNAL MEDICINE

## 2022-09-13 PROCEDURE — G8400 PT W/DXA NO RESULTS DOC: HCPCS | Performed by: INTERNAL MEDICINE

## 2022-09-13 PROCEDURE — 3023F SPIROM DOC REV: CPT | Performed by: INTERNAL MEDICINE

## 2022-09-13 PROCEDURE — 4004F PT TOBACCO SCREEN RCVD TLK: CPT | Performed by: INTERNAL MEDICINE

## 2022-09-13 PROCEDURE — G8428 CUR MEDS NOT DOCUMENT: HCPCS | Performed by: INTERNAL MEDICINE

## 2022-09-13 RX ORDER — FLUTICASONE PROPIONATE AND SALMETEROL 250; 50 UG/1; UG/1
1 POWDER RESPIRATORY (INHALATION) EVERY 12 HOURS
COMMUNITY

## 2022-09-13 RX ORDER — LORATADINE 10 MG/1
10 CAPSULE, LIQUID FILLED ORAL DAILY
COMMUNITY

## 2022-09-13 NOTE — PATIENT INSTRUCTIONS
Vaccine Information Statement    Influenza (Flu) Vaccine (Inactivated or Recombinant): What You Need to Know    Many vaccine information statements are available in Arabic and other languages. See www.immunize.org/vis. Hojas de información sobre vacunas están disponibles en español y en muchos otros idiomas. Visite www.immunize.org/vis. 1. Why get vaccinated? Influenza vaccine can prevent influenza (flu). Flu is a contagious disease that spreads around the [facility] every year, usually between October and May. Anyone can get the flu, but it is more dangerous for some people. Infants and young children, people 72 years and older, pregnant people, and people with certain health conditions or a weakened immune system are at greatest risk of flu complications. Pneumonia, bronchitis, sinus infections, and ear infections are examples of flu-related complications. If you have a medical condition, such as heart disease, cancer, or diabetes, flu can make it worse. Flu can cause fever and chills, sore throat, muscle aches, fatigue, cough, headache, and runny or stuffy nose. Some people may have vomiting and diarrhea, though this is more common in children than adults. In an average year, thousands of people in the [facility] die from flu, and many more are hospitalized. Flu vaccine prevents millions of illnesses and flu-related visits to the doctor each year. 2. Influenza vaccines     CDC recommends everyone 6 months and older get vaccinated every flu season. Children 6 months through 6years of age may need 2 doses during a single flu season. Everyone else needs only 1 dose each flu season. It takes about 2 weeks for protection to develop after vaccination. There are many flu viruses, and they are always changing. Each year a new flu vaccine is made to protect against the influenza viruses believed to be likely to cause disease in the upcoming flu season.  Even when the vaccine doesn't exactly match these viruses, it may still provide some protection. Influenza vaccine does not cause flu. Influenza vaccine may be given at the same time as other vaccines. 3. Talk with your health care provider    Tell your vaccination provider if the person getting the vaccine:  Has had an allergic reaction after a previous dose of influenza vaccine, or has any severe, life-threatening allergies   Has ever had Guillain-Barré Syndrome (also called GBS)    In some cases, your health care provider may decide to postpone influenza vaccination until a future visit. Influenza vaccine can be administered at any time during pregnancy. People who are or will be pregnant during influenza season should receive inactivated influenza vaccine. People with minor illnesses, such as a cold, may be vaccinated. People who are moderately or severely ill should usually wait until they recover before getting influenza vaccine. Your health care provider can give you more information. 4. Risks of a vaccine reaction    Soreness, redness, and swelling where the shot is given, fever, muscle aches, and headache can happen after influenza vaccination. There may be a very small increased risk of Guillain-Barré Syndrome (GBS) after inactivated influenza vaccine (the flu shot). Gurvinder Eddy children who get the flu shot along with pneumococcal vaccine (PCV13) and/or DTaP vaccine at the same time might be slightly more likely to have a seizure caused by fever. Tell your health care provider if a child who is getting flu vaccine has ever had a seizure. People sometimes faint after medical procedures, including vaccination. Tell your provider if you feel dizzy or have vision changes or ringing in the ears. As with any medicine, there is a very remote chance of a vaccine causing a severe allergic reaction, other serious injury, or death. 5. What if there is a serious problem?     An allergic reaction could occur after the vaccinated person leaves the clinic. If you see signs of a severe allergic reaction (hives, swelling of the face and throat, difficulty breathing, a fast heartbeat, dizziness, or weakness), call 9-1-1 and get the person to the nearest hospital.    For other signs that concern you, call your health care provider. Adverse reactions should be reported to the Vaccine Adverse Event Reporting System (VAERS). Your health care provider will usually file this report, or you can do it yourself. Visit the VAERS website at www.vaers. [facility].gov or call 2-143.593.5811. VAERS is only for reporting reactions, and VAERS staff members do not give medical advice. 6. The National Vaccine Injury Compensation Program    The [facility] Vaccine Injury Compensation Program (VICP) is a federal program that was created to compensate people who may have been injured by certain vaccines. Claims regarding alleged injury or death due to vaccination have a time limit for filing, which may be as short as two years. Visit the VICP website at www.[facility].gov/vaccinecompensation or call 5-913.386.5878 to learn about the program and about filing a claim. 7. How can I learn more? Ask your health care provider. Call your local or state health department. Visit the website of the Food and Drug Administration (FDA) for vaccine package inserts and additional information at www.fda.gov/vaccines-blood-biologics/vaccines. Contact the Centers for Disease Control and Prevention (CDC): Call 2-793.109.1981 (1-800-CDC-INFO) or  Visit CDC's influenza website at www.cdc.gov/flu. Vaccine Information Statement   Inactivated Influenza Vaccine   8/6/2021  42 MICHELLE Fuentes [id number]     Department of Health and Human Services  Centers for Disease Control and Prevention    Office Use Only    Vaccine Information Statement    Pneumococcal Polysaccharide Vaccine: What You Need to Know    Many Vaccine Information Statements are available in Lithuanian and other languages. See www.immunize.org/vis. Hojas de información Sobre Vacunas están disponibles en español y en muchos otros idiomas. Visite [employer].si. 1. Why get vaccinated? Vaccination can protect older adults (and some children and younger adults) from pneumococcal disease. Pneumococcal disease is caused by bacteria that can spread from person to person through close contact. It can cause ear infections, and it can also lead to more serious infections of the:  Lungs (pneumonia),  Blood (bacteremia), and  Covering of the brain and spinal cord (meningitis). Meningitis can cause deafness and brain damage, and it can be fatal.      Anyone can get pneumococcal disease, but children under 3years of age, people with certain medical conditions, adults over 72years of age, and cigarette smokers are at the highest risk. About 18,000 older adults die each year from pneumococcal disease in the [place]. Treatment of pneumococcal infections with penicillin and other drugs used to be more effective. But some strains of the disease have become resistant to these drugs. This makes prevention of the disease, through vaccination, even more important. 2. Pneumococcal polysaccharide vaccine (PPSV23)    Pneumococcal polysaccharide vaccine (PPSV23) protects against 23 types of pneumococcal bacteria. It will not prevent all pneumococcal disease. PPSV23 is recommended for: All adults 72years of age and older,  Anyone 2 through 59years of age with certain long-term health problems,  Anyone 2 through 59years of age with a weakened immune system,  Adults 23 through 59years of age who smoke cigarettes or have asthma. Most people need only one dose of PPSV. A second dose is recommended for certain high-risk groups. People 72 and older should get a dose even if they have gotten one or more doses of the vaccine before they turned 65.     Your healthcare provider can give you more information about these recommendations. Most healthy adults develop protection within 2 to 3 weeks of getting the shot. 3. Some people should not get this vaccine    Anyone who has had a life-threatening allergic reaction to PPSV should not get another dose. Anyone who has a severe allergy to any component of PPSV should not receive it. Tell your provider if you have any severe allergies. Anyone who is moderately or severely ill when the shot is scheduled may be asked to wait until they recover before getting the vaccine. Someone with a mild illness can usually be vaccinated. Children less than 3years of age should not receive this vaccine. There is no evidence that PPSV is harmful to either a pregnant woman or to her fetus. However, as a precaution, women who need the vaccine should be vaccinated before becoming pregnant, if possible. 4. Risks of a vaccine reaction    With any medicine, including vaccines, there is a chance of side effects. These are usually mild and go away on their own, but serious reactions are also possible. About half of people who get PPSV have mild side effects, such as redness or pain where the shot is given, which go away within about two days. Less than 1 out of 100 people develop a fever, muscle aches, or more severe local reactions. Problems that could happen after any vaccine:    People sometimes faint after a medical procedure, including vaccination. Sitting or lying down for about 15 minutes can help prevent fainting, and injuries caused by a fall. Tell your doctor if you feel dizzy, or have vision changes or ringing in the ears. Some people get severe pain in the shoulder and have difficulty moving the arm where a shot was given. This happens very rarely. Any medication can cause a severe allergic reaction. Such reactions from a vaccine are very rare, estimated at about 1 in a million doses, and would happen within a few minutes to a few hours after the vaccination. As with any medicine, there is a very remote chance of a vaccine causing a serious injury or death. The safety of vaccines is always being monitored. For more information, visit: www.cdc.gov/vaccinesafety/     5. What if there is a serious reaction? What should I look for? Look for anything that concerns you, such as signs of a severe allergic reaction, very high fever, or unusual behavior. Signs of a severe allergic reaction can include hives, swelling of the face and throat, difficulty breathing, a fast heartbeat, dizziness, and weakness. These would usually start a few minutes to a few hours after the vaccination. What should I do? If you think it is a severe allergic reaction or other emergency that cant wait, call 9-1-1 or get to the nearest hospital. Otherwise, call your doctor. Afterward, the reaction should be reported to the Vaccine Adverse Event Reporting System (VAERS). Your doctor might file this report, or you can do it yourself through the VAERS web site at www.vaers. hhs.gov, or by calling 7-523.209.4116. VAERS does not give medical advice. 6. How can I learn more? Ask your doctor. He or she can give you the vaccine package insert or suggest other sources of information. Call your local or state health department. Contact the Centers for Disease Control and Prevention (CDC):   Call 7-282.284.2564 (1-800-CDC-INFO) or  Visit CDCs website at [address]   04/24/2015    [facility] and [facility] for Disease Control and Prevention    Office Use Only

## 2022-09-13 NOTE — PROGRESS NOTES
[facility]                                                 CHIEF COMPLAINT: follow up COPD        HPI:   Doing pretty good  Some cough with clear sputum  No hemoptysis   Uses Albuterol 2 times/day   Smoking 1 ppd   Could not afford Symbicort and now on Advair       Past Medical History:   Diagnosis Date    CAD (coronary artery disease) [id number]    5 cardiac stents    Hypertension     PONV (postoperative nausea and vomiting)        Past Surgical History:        Procedure Laterality Date    CORONARY ANGIOPLASTY WITH STENT PLACEMENT      5 stents from [id number]    EYE SURGERY Left 07/01/2016    cataract    EYE SURGERY Right 07/22/2016    cataract    FOOT AMPUTATION Left 12/23/2020    PARTIAL FIFTH DIGIT AMPUTATION LEFT FOOT performed by Luis Mas DPM at [address] Bilateral     FOOT SURGERY Left     HAND SURGERY Left     Trigger finger    HYSTERECTOMY         Allergies:  is allergic to ibuprofen, niacin and related, statins, and zetia [ezetimibe]. Social History:    TOBACCO:   reports that she has been smoking cigarettes. She has a 41.00 pack-year smoking history. She has never used smokeless tobacco.  ETOH:   reports no history of alcohol use.       Family History:   No lung cancer       Current Medications:    Current Outpatient Medications:     fluticasone-salmeterol (ADVAIR DISKUS) 250-50 MCG/ACT AEPB diskus inhaler, Inhale 1 puff into the lungs every 12 hours, Disp: , Rfl:     loratadine (CLARITIN) 10 MG capsule, Take 10 mg by mouth daily, Disp: , Rfl:     albuterol sulfate HFA (PROAIR HFA) 108 (90 Base) MCG/ACT inhaler, Inhale 2 puffs into the lungs every 6 hours as needed for Wheezing or Shortness of Breath, Disp: 18 g, Rfl: 5    HYDROcodone-acetaminophen (NORCO) 5-325 MG per tablet, Take 1 tablet by mouth every 6 hours as needed for Pain., Disp: , Rfl:     lisinopril-hydroCHLOROthiazide (PRINZIDE;ZESTORETIC) 10-12.5 MG per tablet, Take 1 tablet by mouth daily, Disp: , Rfl:     Multiple Vitamins-Minerals (MULTIVITAMIN ADULTS PO), Take by mouth, Disp: , Rfl:     clopidogrel (PLAVIX) 75 MG tablet, Take 75 mg by mouth daily, Disp: , Rfl:     isosorbide mononitrate (IMDUR) 60 MG CR tablet, Take 120 mg by mouth daily , Disp: , Rfl:     aspirin 325 MG tablet, Take 325 mg by mouth daily, Disp: , Rfl:       Objective:   PHYSICAL EXAM:    /64   Pulse 87   Resp 18   Ht 5' 1\" (1.549 m)   Wt 164 lb (74.4 kg)   SpO2 96% Comment: RA  BMI 30.99 kg/m²   Gen: No distress. Eyes: PERRL. No sclera icterus. No conjunctival injection. ENT: No discharge. Pharynx clear. Neck: Trachea midline. No obvious mass. Resp: No accessory muscle use. No crackles. No wheezes. No rhonchi. No dullness on percussion. Good air entry. CV: Regular rate. Regular rhythm. No murmur or rub. No edema. GI: Non-tender. Non-distended. No hernia. Skin: Warm and dry. No nodule on exposed extremities. Lymph: No cervical LAD. No supraclavicular LAD. M/S: No cyanosis. No joint deformity. No clubbing. Neuro: Awake. Alert. Moves all four extremities. Psych: Oriented x 3. No anxiety. DATA reviewed by me:   CT chest 7/14/2020   KAMINI 1 cm nodule  Mild to moderate emphysema       PET scan 7/24/2020  No suspicious hypermetabolic activity with a lung nodule  Heterogeneous appearance of the thyroid gland with nodule and calcifications  Infrarenal aorti aneurysm 3 cm    CT chest 10/26/2020   No definitive change in left upper lobe nodule    CT chest 2/5/2021   1. No acute intrapulmonary findings. 2. Stable mild-to-moderate emphysema. 3. Stable size and appearance of a 9 mm irregular nodule within the left lung  apex. CT chest 8/6/2021   Stable spiculated left upper lobe nodule    CT chest 2/7/2022   1. No acute intrapulmonary findings. 2. Stable chronic emphysema.   3. Multiple stable scattered pulmonary nodules, including an 11 x 8 mm  spiculated nodule within the left lung apex, unchanged dating back to  07/15/2020.    4. Stable 3.0 cm aneurysm of the infrarenal abdominal aorta. Potential  further follow-up of this abnormality is as advised below. Assessment:       Pulmonary emphysema  Chronic cough- uncontrolled emphysema   KAMINI 1 cm nodule on CT 7/14/2020. No metabolic activities on PET scan 7/24/2020.   Stable on repeat CT 2/8/2022  Thyroid nodule-  Followed by PCP   48 pack year smoking    CAD post stenting   Not jasen to afford Spiriva or Incruise       Plan:       CT chest February 2023  Continue Albuterol 2 puffs Q4-6 hrs PRN  Continue Advair BID   Mucinex twice daily as needed  Advised to to get Covid   Give one dose of PCV 15 or PCV 20 and Flu vaccine today   Smoking cessation counseling-  Advised at least to cut down on number of cigarettes  Follow up after CT chest

## 2023-02-09 ENCOUNTER — HOSPITAL ENCOUNTER (OUTPATIENT)
Dept: CT IMAGING | Age: 68
Discharge: HOME | End: 2023-02-09
Payer: MEDICARE

## 2023-02-09 DIAGNOSIS — R91.1 PULMONARY NODULE: ICD-10-CM

## 2023-02-09 PROCEDURE — 71250 CT THORAX DX C-: CPT

## 2023-02-22 ENCOUNTER — OFFICE VISIT (OUTPATIENT)
Dept: PULMONOLOGY | Age: 68
End: 2023-02-22
Payer: MEDICARE

## 2023-02-22 ENCOUNTER — TELEPHONE (OUTPATIENT)
Dept: PULMONOLOGY | Age: 68
End: 2023-02-22

## 2023-02-22 VITALS
OXYGEN SATURATION: 95 % | HEIGHT: 61 IN | SYSTOLIC BLOOD PRESSURE: 139 MMHG | RESPIRATION RATE: 18 BRPM | HEART RATE: 103 BPM | BODY MASS INDEX: 33.04 KG/M2 | WEIGHT: 175 LBS | DIASTOLIC BLOOD PRESSURE: 70 MMHG

## 2023-02-22 DIAGNOSIS — J43.9 PULMONARY EMPHYSEMA, UNSPECIFIED EMPHYSEMA TYPE (HCC): Primary | ICD-10-CM

## 2023-02-22 DIAGNOSIS — Z72.0 TOBACCO ABUSE: ICD-10-CM

## 2023-02-22 DIAGNOSIS — R91.1 LUNG NODULE: ICD-10-CM

## 2023-02-22 DIAGNOSIS — J43.2 CENTRILOBULAR EMPHYSEMA (HCC): Primary | ICD-10-CM

## 2023-02-22 PROCEDURE — G8427 DOCREV CUR MEDS BY ELIG CLIN: HCPCS | Performed by: INTERNAL MEDICINE

## 2023-02-22 PROCEDURE — 99214 OFFICE O/P EST MOD 30 MIN: CPT | Performed by: INTERNAL MEDICINE

## 2023-02-22 PROCEDURE — 1123F ACP DISCUSS/DSCN MKR DOCD: CPT | Performed by: INTERNAL MEDICINE

## 2023-02-22 PROCEDURE — 1090F PRES/ABSN URINE INCON ASSESS: CPT | Performed by: INTERNAL MEDICINE

## 2023-02-22 PROCEDURE — G8400 PT W/DXA NO RESULTS DOC: HCPCS | Performed by: INTERNAL MEDICINE

## 2023-02-22 PROCEDURE — G8484 FLU IMMUNIZE NO ADMIN: HCPCS | Performed by: INTERNAL MEDICINE

## 2023-02-22 PROCEDURE — 3017F COLORECTAL CA SCREEN DOC REV: CPT | Performed by: INTERNAL MEDICINE

## 2023-02-22 PROCEDURE — G8417 CALC BMI ABV UP PARAM F/U: HCPCS | Performed by: INTERNAL MEDICINE

## 2023-02-22 PROCEDURE — 3023F SPIROM DOC REV: CPT | Performed by: INTERNAL MEDICINE

## 2023-02-22 PROCEDURE — 4004F PT TOBACCO SCREEN RCVD TLK: CPT | Performed by: INTERNAL MEDICINE

## 2023-02-22 RX ORDER — LISINOPRIL 10 MG/1
10 TABLET ORAL DAILY
COMMUNITY

## 2023-02-22 NOTE — PROGRESS NOTES
[facility]                                                 CHIEF COMPLAINT: follow up COPD/CT         HPI:   CT chest reviewed by me and noted below. Results were dicussed with patient and multiple good questions were answered. Doing pretty good  Was given a sample of Trelegy by PCP and it is expensive   Advair is also very expsive   Not coughing as much   Uses Albuterol once a day   Smoking < 1 ppd       Past Medical History:   Diagnosis Date    CAD (coronary artery disease) [id number]    5 cardiac stents    Hypertension     PONV (postoperative nausea and vomiting)        Past Surgical History:        Procedure Laterality Date    CORONARY ANGIOPLASTY WITH STENT PLACEMENT      5 stents from 7947-9763    EYE SURGERY Left 07/01/2016    cataract    EYE SURGERY Right 07/22/2016    cataract    FOOT AMPUTATION Left 12/23/2020    PARTIAL FIFTH DIGIT AMPUTATION LEFT FOOT performed by Irma Viera DPM at [address] Bilateral     FOOT SURGERY Left     HAND SURGERY Left     Trigger finger    HYSTERECTOMY (CERVIX STATUS UNKNOWN)         Allergies:  is allergic to ibuprofen, niacin and related, statins, and zetia [ezetimibe]. Social History:    TOBACCO:   reports that she has been smoking cigarettes. She has a 41.00 pack-year smoking history. She has never used smokeless tobacco.  ETOH:   reports no history of alcohol use.       Family History:   No lung cancer       Current Medications:    Current Outpatient Medications:     lisinopril (PRINIVIL;ZESTRIL) 10 MG tablet, Take 10 mg by mouth daily, Disp: , Rfl:     loratadine (CLARITIN) 10 MG capsule, Take 10 mg by mouth daily, Disp: , Rfl:     albuterol sulfate HFA (PROAIR HFA) 108 (90 Base) MCG/ACT inhaler, Inhale 2 puffs into the lungs every 6 hours as needed for Wheezing or Shortness of Breath, Disp: 18 g, Rfl: 5    Multiple Vitamins-Minerals (MULTIVITAMIN ADULTS PO), Take by mouth, Disp: , Rfl:     clopidogrel (PLAVIX) 75 MG tablet, Take 75 mg by mouth daily, Disp: , Rfl:     isosorbide mononitrate (IMDUR) 60 MG CR tablet, Take 120 mg by mouth daily , Disp: , Rfl:     aspirin 325 MG tablet, Take 325 mg by mouth daily, Disp: , Rfl:     fluticasone-salmeterol (ADVAIR) 250-50 MCG/ACT AEPB diskus inhaler, Inhale 1 puff into the lungs every 12 hours (Patient not taking: Reported on 2/22/2023), Disp: , Rfl:     HYDROcodone-acetaminophen (NORCO) 5-325 MG per tablet, Take 1 tablet by mouth every 6 hours as needed for Pain. (Patient not taking: Reported on 2/22/2023), Disp: , Rfl:     lisinopril-hydroCHLOROthiazide (PRINZIDE;ZESTORETIC) 10-12.5 MG per tablet, Take 1 tablet by mouth daily (Patient not taking: Reported on 2/22/2023), Disp: , Rfl:       Objective:   PHYSICAL EXAM:    /70 (Site: Left Upper Arm, Position: Sitting, Cuff Size: Medium Adult)   Pulse (!) 103   Resp 18   Ht 5' 1\" (1.549 m)   Wt 175 lb (79.4 kg)   SpO2 95% Comment: RA  BMI 33.07 kg/m²   Gen: No distress. Eyes: PERRL. No sclera icterus. No conjunctival injection. ENT: No discharge. Pharynx clear. Neck: Trachea midline. No obvious mass. Resp: No accessory muscle use. No crackles. No wheezes. No rhonchi. No dullness on percussion. Good air entry. CV: Regular rate. Regular rhythm. No murmur or rub. No edema. GI: Non-tender. Non-distended. No hernia. Skin: Warm and dry. No nodule on exposed extremities. Lymph: No cervical LAD. No supraclavicular LAD. M/S: No cyanosis. No joint deformity. No clubbing. Neuro: Awake. Alert. Moves all four extremities. Psych: Oriented x 3. No anxiety.                  DATA reviewed by me:   CT chest 7/14/2020   KAMINI 1 cm nodule  Mild to moderate emphysema       PET scan 7/24/2020  No suspicious hypermetabolic activity with a lung nodule  Heterogeneous appearance of the thyroid gland with nodule and calcifications  Infrarenal aorti aneurysm 3 cm    CT chest 10/26/2020 No definitive change in left upper lobe nodule    CT chest 2/5/2021   1. No acute intrapulmonary findings. 2. Stable mild-to-moderate emphysema. 3. Stable size and appearance of a 9 mm irregular nodule within the left lung  apex. CT chest 8/6/2021   Stable spiculated left upper lobe nodule    CT chest 2/7/2022   1. No acute intrapulmonary findings. 2. Stable chronic emphysema. 3. Multiple stable scattered pulmonary nodules, including an 11 x 8 mm  spiculated nodule within the left lung apex, unchanged dating back to  07/15/2020.    4. Stable 3.0 cm aneurysm of the infrarenal abdominal aorta. Potential  further follow-up of this abnormality is as advised below. CT chest 2/9/23  images reviewed by me and showed:   1. No suspicious pulmonary mass or nodule. 2. Previously followed left apical nodule shows stability over greater than 2  years and negative prior PET-CT. Appearance and lack of change strongly  suggests apical fibrotic scarring that is chronic. 3. Moderate centrilobular emphysema and granulomatous changes that are stable        Assessment:       Pulmonary emphysema  KAMINI 1 cm nodule on CT 7/14/2020. No metabolic activities on PET scan 7/24/2020. Stable on repeat CT 2/9/23  48 pack year smoking    CAD post stenting   Not jasen to afford Spiriva or Incruise       Plan:       CT chest, low dose protocol, screening for lung cancer Feb 2024. Risks, benefits and alternatives including doing nothing were discussed with patient.   Continue Albuterol 2 puffs Q4-6 hrs PRN  D/C Trelegy/Advair  Trial of Stiolto Respimat Tiotropium bromide 2.5 mcg/Olodaterol 2.5 mcg 2 puffs daily    Mucinex twice daily as needed  Patient is up to date with Covid vaccine, Pneumococcal vaccine and Influenza  Smoking cessation counseling  Follow up in 6 months or sooner if needed

## 2023-05-12 ENCOUNTER — TELEPHONE (OUTPATIENT)
Dept: INTERNAL MEDICINE CLINIC | Age: 68
End: 2023-05-12

## 2023-05-12 ENCOUNTER — OFFICE VISIT (OUTPATIENT)
Dept: INTERNAL MEDICINE CLINIC | Age: 68
End: 2023-05-12

## 2023-05-12 DIAGNOSIS — M79.89 SWELLING OF BOTH LOWER EXTREMITIES: Primary | ICD-10-CM

## 2023-05-12 DIAGNOSIS — I50.20 SYSTOLIC CONGESTIVE HEART FAILURE, UNSPECIFIED HF CHRONICITY (HCC): ICD-10-CM

## 2023-05-12 DIAGNOSIS — M79.89 SWELLING OF BOTH LOWER EXTREMITIES: ICD-10-CM

## 2023-05-12 DIAGNOSIS — K80.20 CHOLELITHIASIS WITHOUT CHOLECYSTITIS: Primary | ICD-10-CM

## 2023-05-12 PROCEDURE — 4004F PT TOBACCO SCREEN RCVD TLK: CPT | Performed by: NURSE PRACTITIONER

## 2023-05-12 PROCEDURE — 1123F ACP DISCUSS/DSCN MKR DOCD: CPT | Performed by: NURSE PRACTITIONER

## 2023-05-12 PROCEDURE — G8427 DOCREV CUR MEDS BY ELIG CLIN: HCPCS | Performed by: NURSE PRACTITIONER

## 2023-05-12 PROCEDURE — G8400 PT W/DXA NO RESULTS DOC: HCPCS | Performed by: NURSE PRACTITIONER

## 2023-05-12 PROCEDURE — 3017F COLORECTAL CA SCREEN DOC REV: CPT | Performed by: NURSE PRACTITIONER

## 2023-05-12 PROCEDURE — 99204 OFFICE O/P NEW MOD 45 MIN: CPT | Performed by: NURSE PRACTITIONER

## 2023-05-12 PROCEDURE — G8417 CALC BMI ABV UP PARAM F/U: HCPCS | Performed by: NURSE PRACTITIONER

## 2023-05-12 PROCEDURE — 1090F PRES/ABSN URINE INCON ASSESS: CPT | Performed by: NURSE PRACTITIONER

## 2023-05-12 RX ORDER — ISOSORBIDE MONONITRATE 120 MG/1
120 TABLET, EXTENDED RELEASE ORAL DAILY
Status: ON HOLD | COMMUNITY

## 2023-05-12 RX ORDER — FUROSEMIDE 40 MG/1
40 TABLET ORAL DAILY
Qty: 90 TABLET | Refills: 0 | Status: ON HOLD | OUTPATIENT
Start: 2023-05-12

## 2023-05-12 RX ORDER — IBUPROFEN 800 MG/1
800 TABLET ORAL PRN
COMMUNITY
End: 2023-05-15 | Stop reason: ALTCHOICE

## 2023-05-12 RX ORDER — PANTOPRAZOLE SODIUM 40 MG/1
40 TABLET, DELAYED RELEASE ORAL DAILY
Status: ON HOLD | COMMUNITY

## 2023-05-12 RX ORDER — PRAMIPEXOLE DIHYDROCHLORIDE 0.25 MG/1
0.25 TABLET ORAL
Status: ON HOLD | COMMUNITY

## 2023-05-12 RX ORDER — HYDROCHLOROTHIAZIDE 25 MG/1
25 TABLET ORAL DAILY
COMMUNITY
End: 2023-05-12

## 2023-05-12 NOTE — TELEPHONE ENCOUNTER
[facility] scheduling could not get patient in today, and vascular lab is closed over the weekend. Scheduling advised to call back on Monday to schedule patient. Patient informed.

## 2023-05-12 NOTE — TELEPHONE ENCOUNTER
----- Message from VEL Burks CNP sent at 5/12/2023  2:07 PM EDT -----  Contact: pt [phone]  I can change the order to stat if they can get her in sooner than June 1 here or at [facility] for venous doppler    ----- Message -----  From: Ele Haas  Sent: 5/12/2023   1:02 PM EDT  To: VEL Burks CNP    Pt states she went to schedule her foot bi lateral and scheduling let her know theres nothing open until June 1st. Wondering if there's any way to get her in sooner? Please advise.

## 2023-05-14 ENCOUNTER — HOSPITAL ENCOUNTER (OUTPATIENT)
Age: 68
Setting detail: OBSERVATION
Discharge: ANOTHER HOSPITAL | End: 2023-05-15
Attending: STUDENT IN AN ORGANIZED HEALTH CARE EDUCATION/TRAINING PROGRAM | Admitting: HOSPITALIST
Payer: MEDICARE

## 2023-05-14 DIAGNOSIS — R07.9 CHEST PAIN, UNSPECIFIED TYPE: Primary | ICD-10-CM

## 2023-05-14 PROCEDURE — 99285 EMERGENCY DEPT VISIT HI MDM: CPT

## 2023-05-15 ENCOUNTER — TELEPHONE (OUTPATIENT)
Dept: INTERNAL MEDICINE CLINIC | Age: 68
End: 2023-05-15

## 2023-05-15 ENCOUNTER — APPOINTMENT (OUTPATIENT)
Dept: CT IMAGING | Age: 68
End: 2023-05-15
Payer: MEDICARE

## 2023-05-15 ENCOUNTER — APPOINTMENT (OUTPATIENT)
Dept: VASCULAR LAB | Age: 68
End: 2023-05-15
Payer: MEDICARE

## 2023-05-15 ENCOUNTER — APPOINTMENT (OUTPATIENT)
Dept: GENERAL RADIOLOGY | Age: 68
End: 2023-05-15
Payer: MEDICARE

## 2023-05-15 ENCOUNTER — HOSPITAL ENCOUNTER (INPATIENT)
Age: 68
LOS: 11 days | Discharge: HOME | DRG: 233 | End: 2023-05-26
Attending: INTERNAL MEDICINE
Payer: MEDICARE

## 2023-05-15 VITALS
WEIGHT: 171 LBS | OXYGEN SATURATION: 96 % | BODY MASS INDEX: 32.31 KG/M2 | DIASTOLIC BLOOD PRESSURE: 50 MMHG | TEMPERATURE: 98 F | SYSTOLIC BLOOD PRESSURE: 96 MMHG | HEART RATE: 87 BPM | RESPIRATION RATE: 23 BRPM

## 2023-05-15 DIAGNOSIS — M79.89 SWELLING OF BOTH LOWER EXTREMITIES: ICD-10-CM

## 2023-05-15 DIAGNOSIS — I25.10 MULTIPLE VESSEL CORONARY ARTERY DISEASE: Primary | ICD-10-CM

## 2023-05-15 PROBLEM — R77.8 TROPONIN LEVEL ELEVATED: Status: ACTIVE | Noted: 2023-05-15

## 2023-05-15 PROBLEM — Z72.0 NICOTINE USE: Status: ACTIVE | Noted: 2023-05-15

## 2023-05-15 PROBLEM — R07.9 CHEST PAIN: Status: ACTIVE | Noted: 2023-05-15

## 2023-05-15 PROBLEM — I77.9 PERIPHERAL ARTERIAL OCCLUSIVE DISEASE (HCC): Status: ACTIVE | Noted: 2023-05-15

## 2023-05-15 PROBLEM — R79.89 TROPONIN LEVEL ELEVATED: Status: ACTIVE | Noted: 2023-05-15

## 2023-05-15 PROBLEM — J44.9 CHRONIC OBSTRUCTIVE PULMONARY DISEASE (HCC): Status: ACTIVE | Noted: 2023-05-15

## 2023-05-15 PROBLEM — I50.40 COMBINED SYSTOLIC AND DIASTOLIC CONGESTIVE HEART FAILURE (HCC): Status: ACTIVE | Noted: 2023-05-15

## 2023-05-15 PROBLEM — R09.02 HYPOXIC: Status: ACTIVE | Noted: 2023-05-15

## 2023-05-15 LAB
ALBUMIN SERPL-MCNC: 3.4 G/DL (ref 3.4–5)
ALBUMIN SERPL-MCNC: 3.9 G/DL (ref 3.4–5)
ALBUMIN/GLOB SERPL: 1.2 {RATIO} (ref 1.1–2.2)
ALBUMIN/GLOB SERPL: 1.2 {RATIO} (ref 1.1–2.2)
ALP SERPL-CCNC: 75 U/L (ref 40–129)
ALP SERPL-CCNC: 83 U/L (ref 40–129)
ALT SERPL-CCNC: 11 U/L (ref 10–40)
ALT SERPL-CCNC: 13 U/L (ref 10–40)
ANION GAP SERPL CALCULATED.3IONS-SCNC: 11 MMOL/L (ref 3–16)
ANION GAP SERPL CALCULATED.3IONS-SCNC: 11 MMOL/L (ref 3–16)
ANTI-XA UNFRAC HEPARIN: 0.25 IU/ML (ref 0.3–0.7)
ANTI-XA UNFRAC HEPARIN: 0.42 IU/ML (ref 0.3–0.7)
ANTI-XA UNFRAC HEPARIN: 0.43 IU/ML (ref 0.3–0.7)
APTT BLD: 29.6 SEC (ref 22.7–35.9)
APTT BLD: 36.6 SEC (ref 22.7–35.9)
AST SERPL-CCNC: 14 U/L (ref 15–37)
AST SERPL-CCNC: 14 U/L (ref 15–37)
BASOPHILS # BLD: 0.1 K/UL (ref 0–0.2)
BASOPHILS NFR BLD: 0.8 %
BILIRUB SERPL-MCNC: 0.4 MG/DL (ref 0–1)
BILIRUB SERPL-MCNC: 0.4 MG/DL (ref 0–1)
BUN SERPL-MCNC: 15 MG/DL (ref 7–20)
BUN SERPL-MCNC: 17 MG/DL (ref 7–20)
CALCIUM SERPL-MCNC: 8.8 MG/DL (ref 8.3–10.6)
CALCIUM SERPL-MCNC: 9.4 MG/DL (ref 8.3–10.6)
CHLORIDE SERPL-SCNC: 101 MMOL/L (ref 99–110)
CHLORIDE SERPL-SCNC: 98 MMOL/L (ref 99–110)
CO2 SERPL-SCNC: 23 MMOL/L (ref 21–32)
CO2 SERPL-SCNC: 25 MMOL/L (ref 21–32)
CREAT SERPL-MCNC: 0.7 MG/DL (ref 0.6–1.2)
CREAT SERPL-MCNC: 0.8 MG/DL (ref 0.6–1.2)
D DIMER: 2.08 UG/ML FEU (ref 0–0.6)
DEPRECATED RDW RBC AUTO: 15.3 % (ref 12.4–15.4)
DEPRECATED RDW RBC AUTO: 15.5 % (ref 12.4–15.4)
EKG ATRIAL RATE: 108 BPM
EKG ATRIAL RATE: 93 BPM
EKG DIAGNOSIS: NORMAL
EKG DIAGNOSIS: NORMAL
EKG P AXIS: 51 DEGREES
EKG P AXIS: 53 DEGREES
EKG P-R INTERVAL: 158 MS
EKG P-R INTERVAL: 166 MS
EKG Q-T INTERVAL: 364 MS
EKG Q-T INTERVAL: 376 MS
EKG QRS DURATION: 84 MS
EKG QRS DURATION: 88 MS
EKG QTC CALCULATION (BAZETT): 467 MS
EKG QTC CALCULATION (BAZETT): 487 MS
EKG R AXIS: 0 DEGREES
EKG R AXIS: 38 DEGREES
EKG T AXIS: 52 DEGREES
EKG T AXIS: 57 DEGREES
EKG VENTRICULAR RATE: 108 BPM
EKG VENTRICULAR RATE: 93 BPM
EOSINOPHIL # BLD: 0 K/UL (ref 0–0.6)
EOSINOPHIL NFR BLD: 0 %
GFR SERPLBLD CREATININE-BSD FMLA CKD-EPI: >60 ML/MIN/{1.73_M2}
GFR SERPLBLD CREATININE-BSD FMLA CKD-EPI: >60 ML/MIN/{1.73_M2}
GLUCOSE SERPL-MCNC: 108 MG/DL (ref 70–99)
GLUCOSE SERPL-MCNC: 125 MG/DL (ref 70–99)
HCT VFR BLD AUTO: 39.5 % (ref 36–48)
HCT VFR BLD AUTO: 42.4 % (ref 36–48)
HGB BLD-MCNC: 13 G/DL (ref 12–16)
HGB BLD-MCNC: 13.9 G/DL (ref 12–16)
INR PPP: 1.03 (ref 0.84–1.16)
LACTATE BLDV-SCNC: 0.8 MMOL/L (ref 0.4–2)
LV EF: 28 %
LVEF MODALITY: NORMAL
LYMPHOCYTES # BLD: 2.1 K/UL (ref 1–5.1)
LYMPHOCYTES NFR BLD: 15.6 %
MAGNESIUM SERPL-MCNC: 1.9 MG/DL (ref 1.8–2.4)
MCH RBC QN AUTO: 28.2 PG (ref 26–34)
MCH RBC QN AUTO: 28.3 PG (ref 26–34)
MCHC RBC AUTO-ENTMCNC: 32.8 G/DL (ref 31–36)
MCHC RBC AUTO-ENTMCNC: 32.8 G/DL (ref 31–36)
MCV RBC AUTO: 85.8 FL (ref 80–100)
MCV RBC AUTO: 86.4 FL (ref 80–100)
MONOCYTES # BLD: 0.8 K/UL (ref 0–1.3)
MONOCYTES NFR BLD: 6 %
NEUTROPHILS # BLD: 10.6 K/UL (ref 1.7–7.7)
NEUTROPHILS NFR BLD: 77.6 %
NT-PROBNP SERPL-MCNC: 749 PG/ML (ref 0–124)
PLATELET # BLD AUTO: 235 K/UL (ref 135–450)
PLATELET # BLD AUTO: 248 K/UL (ref 135–450)
PMV BLD AUTO: 7.5 FL (ref 5–10.5)
PMV BLD AUTO: 7.6 FL (ref 5–10.5)
POTASSIUM SERPL-SCNC: 3.7 MMOL/L (ref 3.5–5.1)
POTASSIUM SERPL-SCNC: 3.8 MMOL/L (ref 3.5–5.1)
PROCALCITONIN SERPL IA-MCNC: 0.05 NG/ML (ref 0–0.15)
PROT SERPL-MCNC: 6.2 G/DL (ref 6.4–8.2)
PROT SERPL-MCNC: 7.1 G/DL (ref 6.4–8.2)
PROTHROMBIN TIME: 13.5 SEC (ref 11.5–14.8)
RBC # BLD AUTO: 4.57 M/UL (ref 4–5.2)
RBC # BLD AUTO: 4.94 M/UL (ref 4–5.2)
REASON FOR REJECTION: NORMAL
REJECTED TEST: NORMAL
SODIUM SERPL-SCNC: 134 MMOL/L (ref 136–145)
SODIUM SERPL-SCNC: 135 MMOL/L (ref 136–145)
TROPONIN, HIGH SENSITIVITY: 16 NG/L (ref 0–14)
TROPONIN, HIGH SENSITIVITY: 17 NG/L (ref 0–14)
TROPONIN, HIGH SENSITIVITY: 19 NG/L (ref 0–14)
WBC # BLD AUTO: 11 K/UL (ref 4–11)
WBC # BLD AUTO: 13.7 K/UL (ref 4–11)

## 2023-05-15 PROCEDURE — 93010 ELECTROCARDIOGRAM REPORT: CPT | Performed by: INTERNAL MEDICINE

## 2023-05-15 PROCEDURE — 84145 PROCALCITONIN (PCT): CPT

## 2023-05-15 PROCEDURE — 96365 THER/PROPH/DIAG IV INF INIT: CPT

## 2023-05-15 PROCEDURE — 6360000004 HC RX CONTRAST MEDICATION: Performed by: STUDENT IN AN ORGANIZED HEALTH CARE EDUCATION/TRAINING PROGRAM

## 2023-05-15 PROCEDURE — 93005 ELECTROCARDIOGRAM TRACING: CPT | Performed by: STUDENT IN AN ORGANIZED HEALTH CARE EDUCATION/TRAINING PROGRAM

## 2023-05-15 PROCEDURE — 6370000000 HC RX 637 (ALT 250 FOR IP): Performed by: INTERNAL MEDICINE

## 2023-05-15 PROCEDURE — 96372 THER/PROPH/DIAG INJ SC/IM: CPT

## 2023-05-15 PROCEDURE — 93005 ELECTROCARDIOGRAM TRACING: CPT | Performed by: NURSE PRACTITIONER

## 2023-05-15 PROCEDURE — 99223 1ST HOSP IP/OBS HIGH 75: CPT | Performed by: INTERNAL MEDICINE

## 2023-05-15 PROCEDURE — 99223 1ST HOSP IP/OBS HIGH 75: CPT

## 2023-05-15 PROCEDURE — 93970 EXTREMITY STUDY: CPT

## 2023-05-15 PROCEDURE — 84484 ASSAY OF TROPONIN QUANT: CPT

## 2023-05-15 PROCEDURE — 80053 COMPREHEN METABOLIC PANEL: CPT

## 2023-05-15 PROCEDURE — 6370000000 HC RX 637 (ALT 250 FOR IP): Performed by: NURSE PRACTITIONER

## 2023-05-15 PROCEDURE — 36415 COLL VENOUS BLD VENIPUNCTURE: CPT

## 2023-05-15 PROCEDURE — 71260 CT THORAX DX C+: CPT

## 2023-05-15 PROCEDURE — 83036 HEMOGLOBIN GLYCOSYLATED A1C: CPT

## 2023-05-15 PROCEDURE — 85027 COMPLETE CBC AUTOMATED: CPT

## 2023-05-15 PROCEDURE — C8929 TTE W OR WO FOL WCON,DOPPLER: HCPCS

## 2023-05-15 PROCEDURE — G0378 HOSPITAL OBSERVATION PER HR: HCPCS

## 2023-05-15 PROCEDURE — 85520 HEPARIN ASSAY: CPT

## 2023-05-15 PROCEDURE — 85379 FIBRIN DEGRADATION QUANT: CPT

## 2023-05-15 PROCEDURE — 6370000000 HC RX 637 (ALT 250 FOR IP): Performed by: HOSPITALIST

## 2023-05-15 PROCEDURE — 6360000002 HC RX W HCPCS: Performed by: HOSPITALIST

## 2023-05-15 PROCEDURE — 85610 PROTHROMBIN TIME: CPT

## 2023-05-15 PROCEDURE — 83735 ASSAY OF MAGNESIUM: CPT

## 2023-05-15 PROCEDURE — 93005 ELECTROCARDIOGRAM TRACING: CPT | Performed by: INTERNAL MEDICINE

## 2023-05-15 PROCEDURE — 2580000003 HC RX 258

## 2023-05-15 PROCEDURE — 2580000003 HC RX 258: Performed by: HOSPITALIST

## 2023-05-15 PROCEDURE — 2060000000 HC ICU INTERMEDIATE R&B

## 2023-05-15 PROCEDURE — 71045 X-RAY EXAM CHEST 1 VIEW: CPT

## 2023-05-15 PROCEDURE — 85730 THROMBOPLASTIN TIME PARTIAL: CPT

## 2023-05-15 PROCEDURE — 83880 ASSAY OF NATRIURETIC PEPTIDE: CPT

## 2023-05-15 PROCEDURE — 6360000002 HC RX W HCPCS

## 2023-05-15 PROCEDURE — 83605 ASSAY OF LACTIC ACID: CPT

## 2023-05-15 PROCEDURE — 6360000002 HC RX W HCPCS: Performed by: INTERNAL MEDICINE

## 2023-05-15 PROCEDURE — 96375 TX/PRO/DX INJ NEW DRUG ADDON: CPT

## 2023-05-15 PROCEDURE — 85025 COMPLETE CBC W/AUTO DIFF WBC: CPT

## 2023-05-15 RX ORDER — CLOPIDOGREL BISULFATE 75 MG/1
75 TABLET ORAL DAILY
Status: DISCONTINUED | OUTPATIENT
Start: 2023-05-16 | End: 2023-05-17

## 2023-05-15 RX ORDER — POTASSIUM CHLORIDE 20 MEQ/1
40 TABLET, EXTENDED RELEASE ORAL PRN
Status: DISCONTINUED | OUTPATIENT
Start: 2023-05-15 | End: 2023-05-15 | Stop reason: HOSPADM

## 2023-05-15 RX ORDER — SODIUM CHLORIDE 0.9 % (FLUSH) 0.9 %
5-40 SYRINGE (ML) INJECTION PRN
Status: DISCONTINUED | OUTPATIENT
Start: 2023-05-15 | End: 2023-05-15 | Stop reason: HOSPADM

## 2023-05-15 RX ORDER — ISOSORBIDE MONONITRATE 60 MG/1
120 TABLET, EXTENDED RELEASE ORAL DAILY
Status: DISCONTINUED | OUTPATIENT
Start: 2023-05-16 | End: 2023-05-19

## 2023-05-15 RX ORDER — ONDANSETRON 4 MG/1
4 TABLET, ORALLY DISINTEGRATING ORAL EVERY 8 HOURS PRN
Status: DISCONTINUED | OUTPATIENT
Start: 2023-05-15 | End: 2023-05-15 | Stop reason: HOSPADM

## 2023-05-15 RX ORDER — IBUPROFEN 800 MG/1
800 TABLET ORAL EVERY 6 HOURS PRN
Status: ON HOLD | COMMUNITY
End: 2023-05-26 | Stop reason: HOSPADM

## 2023-05-15 RX ORDER — ACETAMINOPHEN 650 MG/1
650 SUPPOSITORY RECTAL EVERY 6 HOURS PRN
Status: DISCONTINUED | OUTPATIENT
Start: 2023-05-15 | End: 2023-05-15 | Stop reason: HOSPADM

## 2023-05-15 RX ORDER — SODIUM CHLORIDE 0.9 % (FLUSH) 0.9 %
5-40 SYRINGE (ML) INJECTION PRN
Status: DISCONTINUED | OUTPATIENT
Start: 2023-05-15 | End: 2023-05-19

## 2023-05-15 RX ORDER — POLYETHYLENE GLYCOL 3350 17 G/17G
17 POWDER, FOR SOLUTION ORAL DAILY PRN
Status: DISCONTINUED | OUTPATIENT
Start: 2023-05-15 | End: 2023-05-15 | Stop reason: HOSPADM

## 2023-05-15 RX ORDER — FUROSEMIDE 40 MG/1
40 TABLET ORAL DAILY
Status: DISCONTINUED | OUTPATIENT
Start: 2023-05-16 | End: 2023-05-19

## 2023-05-15 RX ORDER — PRAMIPEXOLE DIHYDROCHLORIDE 0.25 MG/1
0.25 TABLET ORAL NIGHTLY
Status: DISCONTINUED | OUTPATIENT
Start: 2023-05-16 | End: 2023-05-19

## 2023-05-15 RX ORDER — SODIUM CHLORIDE 9 MG/ML
INJECTION, SOLUTION INTRAVENOUS PRN
Status: DISCONTINUED | OUTPATIENT
Start: 2023-05-15 | End: 2023-05-15 | Stop reason: HOSPADM

## 2023-05-15 RX ORDER — CLOPIDOGREL BISULFATE 75 MG/1
75 TABLET ORAL DAILY
Status: DISCONTINUED | OUTPATIENT
Start: 2023-05-15 | End: 2023-05-15 | Stop reason: HOSPADM

## 2023-05-15 RX ORDER — ACETAMINOPHEN 325 MG/1
650 TABLET ORAL EVERY 6 HOURS PRN
Status: DISCONTINUED | OUTPATIENT
Start: 2023-05-15 | End: 2023-05-15 | Stop reason: HOSPADM

## 2023-05-15 RX ORDER — HYDROCODONE BITARTRATE AND ACETAMINOPHEN 5; 325 MG/1; MG/1
1 TABLET ORAL EVERY 6 HOURS PRN
Status: ON HOLD | COMMUNITY
End: 2023-05-26 | Stop reason: HOSPADM

## 2023-05-15 RX ORDER — HEPARIN SODIUM 1000 [USP'U]/ML
4000 INJECTION, SOLUTION INTRAVENOUS; SUBCUTANEOUS PRN
Status: DISCONTINUED | OUTPATIENT
Start: 2023-05-15 | End: 2023-05-16 | Stop reason: ALTCHOICE

## 2023-05-15 RX ORDER — HEPARIN SODIUM 10000 [USP'U]/100ML
1110 INJECTION, SOLUTION INTRAVENOUS CONTINUOUS
Status: DISCONTINUED | OUTPATIENT
Start: 2023-05-15 | End: 2023-05-16 | Stop reason: ALTCHOICE

## 2023-05-15 RX ORDER — HEPARIN SODIUM 1000 [USP'U]/ML
2000 INJECTION, SOLUTION INTRAVENOUS; SUBCUTANEOUS PRN
Status: DISCONTINUED | OUTPATIENT
Start: 2023-05-15 | End: 2023-05-15 | Stop reason: HOSPADM

## 2023-05-15 RX ORDER — ISOSORBIDE MONONITRATE 30 MG/1
120 TABLET, EXTENDED RELEASE ORAL DAILY
Status: DISCONTINUED | OUTPATIENT
Start: 2023-05-15 | End: 2023-05-15 | Stop reason: HOSPADM

## 2023-05-15 RX ORDER — SODIUM CHLORIDE 9 MG/ML
INJECTION, SOLUTION INTRAVENOUS ONCE
Status: COMPLETED | OUTPATIENT
Start: 2023-05-15 | End: 2023-05-15

## 2023-05-15 RX ORDER — CYCLOBENZAPRINE HCL 10 MG
10 TABLET ORAL 3 TIMES DAILY PRN
Status: DISCONTINUED | OUTPATIENT
Start: 2023-05-15 | End: 2023-05-15 | Stop reason: HOSPADM

## 2023-05-15 RX ORDER — HYDROCODONE BITARTRATE AND ACETAMINOPHEN 5; 325 MG/1; MG/1
1 TABLET ORAL EVERY 6 HOURS PRN
Status: DISCONTINUED | OUTPATIENT
Start: 2023-05-15 | End: 2023-05-15

## 2023-05-15 RX ORDER — HEPARIN SODIUM 1000 [USP'U]/ML
2000 INJECTION, SOLUTION INTRAVENOUS; SUBCUTANEOUS PRN
Status: DISCONTINUED | OUTPATIENT
Start: 2023-05-15 | End: 2023-05-16 | Stop reason: ALTCHOICE

## 2023-05-15 RX ORDER — FUROSEMIDE 40 MG/1
40 TABLET ORAL DAILY
Status: DISCONTINUED | OUTPATIENT
Start: 2023-05-15 | End: 2023-05-15 | Stop reason: HOSPADM

## 2023-05-15 RX ORDER — SODIUM CHLORIDE 0.9 % (FLUSH) 0.9 %
5-40 SYRINGE (ML) INJECTION EVERY 12 HOURS SCHEDULED
Status: DISCONTINUED | OUTPATIENT
Start: 2023-05-15 | End: 2023-05-19

## 2023-05-15 RX ORDER — ACETAMINOPHEN 325 MG/1
650 TABLET ORAL EVERY 6 HOURS PRN
Status: DISCONTINUED | OUTPATIENT
Start: 2023-05-15 | End: 2023-05-19

## 2023-05-15 RX ORDER — PANTOPRAZOLE SODIUM 40 MG/1
40 TABLET, DELAYED RELEASE ORAL DAILY
Status: DISCONTINUED | OUTPATIENT
Start: 2023-05-16 | End: 2023-05-19

## 2023-05-15 RX ORDER — IPRATROPIUM BROMIDE AND ALBUTEROL SULFATE 2.5; .5 MG/3ML; MG/3ML
1 SOLUTION RESPIRATORY (INHALATION)
Status: DISCONTINUED | OUTPATIENT
Start: 2023-05-15 | End: 2023-05-15 | Stop reason: HOSPADM

## 2023-05-15 RX ORDER — POLYETHYLENE GLYCOL 3350 17 G/17G
17 POWDER, FOR SOLUTION ORAL DAILY PRN
Status: DISCONTINUED | OUTPATIENT
Start: 2023-05-15 | End: 2023-05-19

## 2023-05-15 RX ORDER — HEPARIN SODIUM 1000 [USP'U]/ML
4000 INJECTION, SOLUTION INTRAVENOUS; SUBCUTANEOUS PRN
Status: DISCONTINUED | OUTPATIENT
Start: 2023-05-15 | End: 2023-05-15 | Stop reason: HOSPADM

## 2023-05-15 RX ORDER — SODIUM CHLORIDE 0.9 % (FLUSH) 0.9 %
5-40 SYRINGE (ML) INJECTION EVERY 12 HOURS SCHEDULED
Status: DISCONTINUED | OUTPATIENT
Start: 2023-05-15 | End: 2023-05-15 | Stop reason: HOSPADM

## 2023-05-15 RX ORDER — ENOXAPARIN SODIUM 100 MG/ML
40 INJECTION SUBCUTANEOUS DAILY
Status: DISCONTINUED | OUTPATIENT
Start: 2023-05-15 | End: 2023-05-15

## 2023-05-15 RX ORDER — HEPARIN SODIUM 1000 [USP'U]/ML
4000 INJECTION, SOLUTION INTRAVENOUS; SUBCUTANEOUS ONCE
Status: COMPLETED | OUTPATIENT
Start: 2023-05-15 | End: 2023-05-15

## 2023-05-15 RX ORDER — HEPARIN SODIUM 10000 [USP'U]/100ML
12 INJECTION, SOLUTION INTRAVENOUS CONTINUOUS
Status: DISCONTINUED | OUTPATIENT
Start: 2023-05-15 | End: 2023-05-15 | Stop reason: HOSPADM

## 2023-05-15 RX ORDER — PANTOPRAZOLE SODIUM 40 MG/1
40 TABLET, DELAYED RELEASE ORAL DAILY
Status: DISCONTINUED | OUTPATIENT
Start: 2023-05-15 | End: 2023-05-15 | Stop reason: HOSPADM

## 2023-05-15 RX ORDER — ASPIRIN 81 MG/1
81 TABLET, CHEWABLE ORAL DAILY
Status: DISCONTINUED | OUTPATIENT
Start: 2023-05-16 | End: 2023-05-15 | Stop reason: HOSPADM

## 2023-05-15 RX ORDER — HYDROCODONE BITARTRATE AND ACETAMINOPHEN 5; 325 MG/1; MG/1
1 TABLET ORAL EVERY 6 HOURS PRN
Status: DISCONTINUED | OUTPATIENT
Start: 2023-05-15 | End: 2023-05-19

## 2023-05-15 RX ORDER — ACETAMINOPHEN 650 MG/1
650 SUPPOSITORY RECTAL EVERY 6 HOURS PRN
Status: DISCONTINUED | OUTPATIENT
Start: 2023-05-15 | End: 2023-05-19

## 2023-05-15 RX ORDER — POTASSIUM CHLORIDE 7.45 MG/ML
10 INJECTION INTRAVENOUS PRN
Status: DISCONTINUED | OUTPATIENT
Start: 2023-05-15 | End: 2023-05-15 | Stop reason: HOSPADM

## 2023-05-15 RX ORDER — NICOTINE 21 MG/24HR
1 PATCH, TRANSDERMAL 24 HOURS TRANSDERMAL DAILY
Status: DISCONTINUED | OUTPATIENT
Start: 2023-05-16 | End: 2023-05-19

## 2023-05-15 RX ORDER — SODIUM CHLORIDE 9 MG/ML
INJECTION, SOLUTION INTRAVENOUS PRN
Status: DISCONTINUED | OUTPATIENT
Start: 2023-05-15 | End: 2023-05-19

## 2023-05-15 RX ORDER — ONDANSETRON 2 MG/ML
4 INJECTION INTRAMUSCULAR; INTRAVENOUS EVERY 6 HOURS PRN
Status: DISCONTINUED | OUTPATIENT
Start: 2023-05-15 | End: 2023-05-15 | Stop reason: HOSPADM

## 2023-05-15 RX ORDER — MAGNESIUM SULFATE IN WATER 40 MG/ML
2000 INJECTION, SOLUTION INTRAVENOUS PRN
Status: DISCONTINUED | OUTPATIENT
Start: 2023-05-15 | End: 2023-05-15 | Stop reason: HOSPADM

## 2023-05-15 RX ORDER — PROCHLORPERAZINE EDISYLATE 5 MG/ML
10 INJECTION INTRAMUSCULAR; INTRAVENOUS EVERY 6 HOURS PRN
Status: DISCONTINUED | OUTPATIENT
Start: 2023-05-15 | End: 2023-05-19

## 2023-05-15 RX ORDER — PRAMIPEXOLE DIHYDROCHLORIDE 0.25 MG/1
0.25 TABLET ORAL NIGHTLY
Status: DISCONTINUED | OUTPATIENT
Start: 2023-05-15 | End: 2023-05-15 | Stop reason: HOSPADM

## 2023-05-15 RX ADMIN — HYDROCODONE BITARTRATE AND ACETAMINOPHEN 1 TABLET: 5; 325 TABLET ORAL at 23:01

## 2023-05-15 RX ADMIN — SODIUM CHLORIDE: 9 INJECTION, SOLUTION INTRAVENOUS at 04:56

## 2023-05-15 RX ADMIN — HEPARIN SODIUM 4000 UNITS: 1000 INJECTION INTRAVENOUS; SUBCUTANEOUS at 13:03

## 2023-05-15 RX ADMIN — CYCLOBENZAPRINE 10 MG: 10 TABLET, FILM COATED ORAL at 16:56

## 2023-05-15 RX ADMIN — FUROSEMIDE 40 MG: 40 TABLET ORAL at 08:15

## 2023-05-15 RX ADMIN — PANTOPRAZOLE SODIUM 40 MG: 40 TABLET, DELAYED RELEASE ORAL at 08:15

## 2023-05-15 RX ADMIN — Medication 10 ML: at 08:16

## 2023-05-15 RX ADMIN — SACUBITRIL AND VALSARTAN 1 TABLET: 24; 26 TABLET, FILM COATED ORAL at 10:23

## 2023-05-15 RX ADMIN — CLOPIDOGREL BISULFATE 75 MG: 75 TABLET ORAL at 08:15

## 2023-05-15 RX ADMIN — IOPAMIDOL 75 ML: 755 INJECTION, SOLUTION INTRAVENOUS at 02:21

## 2023-05-15 RX ADMIN — ASPIRIN 325 MG: 325 TABLET, COATED ORAL at 13:02

## 2023-05-15 RX ADMIN — HEPARIN SODIUM 12 UNITS/KG/HR: 10000 INJECTION, SOLUTION INTRAVENOUS at 13:05

## 2023-05-15 RX ADMIN — ISOSORBIDE MONONITRATE 120 MG: 30 TABLET, EXTENDED RELEASE ORAL at 08:15

## 2023-05-15 RX ADMIN — ENOXAPARIN SODIUM 40 MG: 100 INJECTION SUBCUTANEOUS at 08:15

## 2023-05-15 RX ADMIN — SACUBITRIL AND VALSARTAN 1 TABLET: 24; 26 TABLET, FILM COATED ORAL at 23:01

## 2023-05-15 RX ADMIN — AZITHROMYCIN MONOHYDRATE 500 MG: 500 INJECTION, POWDER, LYOPHILIZED, FOR SOLUTION INTRAVENOUS at 13:45

## 2023-05-15 ASSESSMENT — PAIN DESCRIPTION - LOCATION
LOCATION: LEG

## 2023-05-15 ASSESSMENT — PAIN - FUNCTIONAL ASSESSMENT
PAIN_FUNCTIONAL_ASSESSMENT: ACTIVITIES ARE NOT PREVENTED
PAIN_FUNCTIONAL_ASSESSMENT: ACTIVITIES ARE NOT PREVENTED

## 2023-05-15 ASSESSMENT — PAIN SCALES - WONG BAKER
WONGBAKER_NUMERICALRESPONSE: 0
WONGBAKER_NUMERICALRESPONSE: 0

## 2023-05-15 ASSESSMENT — PAIN DESCRIPTION - PAIN TYPE: TYPE: CHRONIC PAIN

## 2023-05-15 ASSESSMENT — PAIN DESCRIPTION - DESCRIPTORS
DESCRIPTORS: ACHING
DESCRIPTORS: ACHING

## 2023-05-15 ASSESSMENT — PAIN DESCRIPTION - ORIENTATION
ORIENTATION: RIGHT

## 2023-05-15 ASSESSMENT — PAIN SCALES - GENERAL
PAINLEVEL_OUTOF10: 5
PAINLEVEL_OUTOF10: 3
PAINLEVEL_OUTOF10: 8
PAINLEVEL_OUTOF10: 3
PAINLEVEL_OUTOF10: 0

## 2023-05-15 ASSESSMENT — PAIN DESCRIPTION - FREQUENCY: FREQUENCY: CONTINUOUS

## 2023-05-15 ASSESSMENT — HEART SCORE: ECG: 0

## 2023-05-15 NOTE — H&P
-+rales on exam  -continue to wean as tolerated   -procalc pending   -breathing treatments   -zithromax 3 days   -recommend smoking cessation      #Mild leukocytosis  -likely reactive   -procalc pending  -LA WNLs   -on zithromax     #Epigastric pain   #Nausea/vomiting  -none currently   -needs EGD done   -PPI for now  -had recent RUQ US with fatty liver, advanced chronic liver disease   -LFTs stable   -needs GI follow up     #Pulmonary nodule   -follows with Dr. Court Alvarez     #HTN   -on entresto, lasix     #HLD   -cannot tolerate statins     DVT Prophylaxis: Heparin   Diet: Diet NPO Exceptions are: Ice Chips, Sips of Water with Meds, Other (Specify);  Specify Other Exceptions: No  caffeine  Code Status: Full Code    Plan to transfer to [facility]  05/15/23  1:37 PM

## 2023-05-15 NOTE — PROGRESS NOTES
Cardiology at bedside. He states he will contact Vascular and pt will need to go to [facility]. Pt Right foot is warm to the touch however has a lot of pain in the leg when ambulating or touching it. Post tib pulse is absent, pedal is dopplerable.

## 2023-05-15 NOTE — TELEPHONE ENCOUNTER
----- Message from VEL Resendez CNP sent at 5/15/2023 12:33 PM EDT -----  Contact: pt [phone]  She's in the ED     ----- Message -----  From: Trav Fu  Sent: 5/15/2023   9:15 AM EDT  To: VEL Resendez CNP    FYI: patient has venous scheduled for this morning but it looks like another doctor ordered it so results may not come to you.  ----- Message -----  From: VEL Resendez CNP  Sent: 5/12/2023   2:07 PM EDT  To: Trav Nickie    I can change the order to stat if they can get her in sooner than June 1 here or at [facility] for venous doppler    ----- Message -----  From: Erika Mantilla  Sent: 5/12/2023   1:02 PM EDT  To: VEL Resendez CNP    Pt states she went to schedule her foot bi lateral and scheduling let her know theres nothing open until June 1st. Wondering if there's any way to get her in sooner? Please advise.

## 2023-05-15 NOTE — PLAN OF CARE
C/w atypical chest pain , currently resolved  , trop 17 and then 16, ekg no acute ischemia  , ddimer elevated ,cta negative for pe  C/o unilater leg pain  and will need venous duplex    Obs tele pcu  card consult  npo  venous duplex ordered    Nurses communication ordered  for  home meds reconcilliation  Primary team follow up with that in am      CTA  IMPRESSION:  1. No pulmonary emboli. 2. Minimal dependent subsegmental atelectasis. No other acute pulmonary  process. 3. Chronic stable emphysematous changes with no suspicious pulmonary nodule      Pmhx  CAD-stents  COPD  RLS  Arthritis  Hypertension  Hyperlipidemia -cannot tolerate statins  Hemoglobin A1C last 6.2  Does smoke.

## 2023-05-15 NOTE — CONSULTS
CARDIOLOGY CONSULTATION        Patient Name: Marv Leonard  Date of admission: 5/14/2023 11:56 PM  Admission Dx: Chest pain [R07.9]  Requesting Physician: Maegan Haley MD  Primary Care physician: Sudhir Carroll MD    Reason for Consultation/Chief Complaint: Chest pain     History of Present Illness:     Marv Leonard is a 70 y.o. patient with a prior medical history notable for cardiomyopathy with severe LV dysfunction, coronary artery disease with prior PCI, peripheral arterial disease, hypertension, COPD, hyperlipidemia, prediabetes, tobacco use, obesity, who presented to the hospital with complaints of chest pain. ED course/Vital signs on presentation: Patient presented with atypical chest pain. EKG showed sinus tachycardia, otherwise normal.  Troponin found to be 17, 16. D-dimer elevated but CTA negative for pulmonary emboli. Cardiomegaly and coronary atherosclerosis noted by CT. Venous Doppler lower extremities negative on preliminary read for DVT. Incidental finding right popliteal and distal posterior tibial artery occlusions. Cardiology consulted for further evaluation. Patient is evaluated with her daughters at bedside today. She notes episode of severe chest pain yesterday lasting about 45 minutes. Substernal and left-sided chest discomfort, sharp in quality without radiation to the arm/neck/jaw. Similar quality to her previous angina for which she received stents. States her last stenting procedure was mid 2000's. Total 5 stents now. No issues since that time. She is from the [facility] area and has not been following with a cardiologist.  Recurrent milder episode of chest discomfort last evening in her daughter's convince her to come to the ED to be evaluated. Denies palpitations, dizziness or syncope. On further history is notable she has been having right lower extremity pain for 2 weeks now. She began having severe pains this past Thursday.   Intermittent, pain

## 2023-05-15 NOTE — PROGRESS NOTES
Matheus TOMPKINS worried pt. Will not get a bed at [facility] today and wants to bring pt. Up to floor. Kimani osman made aware and checking into it and found pt. Does have bed at [facility] just needs to be cleaned. Matheus arizmendi served and made aware.

## 2023-05-15 NOTE — ED NOTES
Call placed to cardiology at [address] [place] [address] for inpatient consult later this am.     Drea Galindo Raul  05/15/23 0615

## 2023-05-15 NOTE — ED PROVIDER NOTES
[address] ED      CHIEF COMPLAINT  Chest Pain (Pt arrives with c/o left sided CP that started around noon today, took nitro and ASA with some relief. Pain returned this evening so she came to hospital. Was seen recently and supposed to have f/u with cardiologist and has not done so yet. )       HISTORY OF PRESENT ILLNESS  Steve Sharif is a 70 y.o. female  who presents to the ED complaining of patient dates that around noon she had midsternal chest pain described as sharp right after drinking Pepsi. Lasted about an hour and then resolved. The pain returned 3 to 4 hours prior to arrival, though not a severe and resolved after about 4 to 5 hours of pain just prior to evaluation. Endorses nausea with dry heaves, no vomiting. She has also had some shortness of breath associated with this. States that she had similar pains in the last month and had a recent echocardiogram due to leg swelling and shortness of breath. No other complaints, modifying factors or associated symptoms. I have reviewed the following from the nursing documentation.     Past Medical History:   Diagnosis Date    CAD (coronary artery disease) 7698-9288    5 cardiac stents    Hypertension     PONV (postoperative nausea and vomiting)      Past Surgical History:   Procedure Laterality Date    CORONARY ANGIOPLASTY WITH STENT PLACEMENT      5 stents from 2101-4312    EYE SURGERY Left 07/01/2016    cataract    EYE SURGERY Right 07/22/2016    cataract    FOOT AMPUTATION Left 12/23/2020    PARTIAL FIFTH DIGIT AMPUTATION LEFT FOOT performed by Joe Bird DPM at [facility] Bilateral     FOOT SURGERY Left     HAND SURGERY Left     Trigger finger    HYSTERECTOMY (CERVIX STATUS UNKNOWN)       Family History   Problem Relation Age of Onset    Cancer Mother     Cancer Father     Diabetes Sister      Social History     Socioeconomic History    Marital status:      Spouse name: Not on file    Number of

## 2023-05-15 NOTE — TELEPHONE ENCOUNTER
----- Message from VEL Love CNP sent at 5/12/2023  2:07 PM EDT -----  Contact: pt [phone]  I can change the order to stat if they can get her in sooner than June 1 here or at [facility] for venous doppler    ----- Message -----  From: Eliana Nixon  Sent: 5/12/2023   1:02 PM EDT  To: VEL Love CNP    Pt states she went to schedule her foot bi lateral and scheduling let her know theres nothing open until June 1st. Wondering if there's any way to get her in sooner? Please advise. - 3+ anasarca, small ascites, JVD on exam: due to CHF. Echo 1/2021 grossly normal.    - bilat. leg erythema  - alc. cirrhosis with very nodular liver, but LFTs normal,  nl.  - ascites: likely cardiac  - mild hyponatremia    - no hepatic encephalopathy currently, but in past caused falls and hospitalization, on lactulose, rifaximin  - (curiosity: gallbladder flipped upwards on CT 4/2021, reviewed with radiology)      -- repeat echo as she has evidence of new right heart failure  -- continue current diuretics  -- can decrease lactulose to 20 g bid  -- continue albumin carefully, 12.5 g tid

## 2023-05-15 NOTE — PROGRESS NOTES
Pharmacy to manage [facility] DoseHeparin - contact for questions. Heparin 60 units/kg IV x 1 (max 4,000 units), then 12 units/kg/hr (recommended max initial rate 1,000 units/hr). Adjust infusion rate based off anti-Xa results below. Next anti-XA-1900 today. anti-Xa < 0.1    Heparin 60 units/kg bolus  Increase infusion by 4 units/kg/hr  anti-Xa 0.1-0.29 Heparin 30 units/kg bolus Increase infusion by 2 units/kg/hr  anti-Xa 0.3-0.7    No bolus No change   anti-Xa 0.71-0.8   No bolus Decrease infusion by 1 units/kg/hr  anti-Xa 0.81-0.99    No bolus Decrease infusion by 2 units/kg/hr  anti-Xa 1 or more     Hold heparin for 1 hour Decrease infusion by 3 units/kg/hr    Obtain anti-Xa hours after bolus and 6 hours after any dose change until two consecutive therapeutic anti-Xa are achieved- then daily.      Monica Donato [facility] Wound Care: Petrolatum Notification Instructions: Patient will be notified of biopsy results. However, patient instructed to call the office if not contacted within 2 weeks. Was A Bandage Applied: Yes Electrodesiccation Text: The wound bed was treated with electrodesiccation after the biopsy was performed. Hemostasis: Electrodesiccation and Aluminum Chloride Size Of Lesion In Cm: 0.2 Detail Level: Detailed Biopsy Type: H and E Biopsy Method: double edge Personna blade Cryotherapy Text: The wound bed was treated with cryotherapy after the biopsy was performed. Bill For Surgical Tray: no Anesthesia Type: 2% lidocaine with epinephrine Lab: -[id number] Lab Facility: 3 Curettage Text: The wound bed was treated with curettage after the biopsy was performed. Consent: Written consent was obtained and risks were reviewed including but not limited to scarring, infection, bleeding, scabbing, incomplete removal, nerve damage and allergy to anesthesia. Type Of Destruction Used: Curettage Electrodesiccation And Curettage Text: The wound bed was treated with electrodesiccation and curettage after the biopsy was performed. Additional Anesthesia Volume In Cc (Will Not Render If 0): 0 Anesthesia Volume In Cc (Will Not Render If 0): 1 Billing Type: Third-Party Bill Anticipated Plan (Based On Presumed Biopsy Results): Exc if + Depth Of Biopsy: dermis Dressing: Band-Aid Silver Nitrate Text: The wound bed was treated with silver nitrate after the biopsy was performed. X Size Of Lesion In Cm: 0.4 Post-Care Instructions: I reviewed with the patient in detail post-care instructions. Patient is to keep the biopsy site dry overnight, and then apply bacitracin twice daily until healed. Patient may apply hydrogen peroxide soaks to remove any crusting.

## 2023-05-15 NOTE — PROGRESS NOTES
Course reviewed. Continue heparin drip. Aspirin added to tomorrow's daily dosing. Noted patient has bed at [facility] Roll is awaiting transfer. Patient noted to have right lower extremity discomfort, intensifying this past Thursday. Arterial occlusion incidentally noted per venous Doppler study. Presently distractible with no significant pain on exam, warm extremity, no pallor, no paresthesias, good motor function. Should the patient have dramatic change in this overnight vascular surgery should be notified. Continue heparin drip in the interim. Otherwise vascular surgery evaluation tomorrow appropriate. Echocardiogram is reviewed, severe LV dysfunction, global with regional apical kinesis. No LV thrombus is seen in contrast-enhanced views. This is a relatively new finding. She will need interventional cardiology assessment tomorrow with likely left and right heart catheterization. She is chest pain-free at this time    Please call our overnight service for any acute changes.     Delisa Patricio MD, [address] [facility]  [phone] [facility]  [phone] [facility]

## 2023-05-16 ENCOUNTER — APPOINTMENT (OUTPATIENT)
Dept: CARDIAC CATH/INVASIVE PROCEDURES | Age: 68
DRG: 233 | End: 2023-05-16
Attending: INTERNAL MEDICINE
Payer: MEDICARE

## 2023-05-16 VITALS
WEIGHT: 171 LBS | SYSTOLIC BLOOD PRESSURE: 100 MMHG | BODY MASS INDEX: 32.28 KG/M2 | HEART RATE: 64 BPM | RESPIRATION RATE: 18 BRPM | DIASTOLIC BLOOD PRESSURE: 60 MMHG | HEIGHT: 61 IN

## 2023-05-16 PROBLEM — I21.4 NSTEMI (NON-ST ELEVATED MYOCARDIAL INFARCTION) (HCC): Status: ACTIVE | Noted: 2023-05-16

## 2023-05-16 PROBLEM — I73.9 CLAUDICATION IN PERIPHERAL VASCULAR DISEASE (HCC): Status: ACTIVE | Noted: 2023-05-16

## 2023-05-16 PROBLEM — I25.10 MULTIPLE VESSEL CORONARY ARTERY DISEASE: Status: ACTIVE | Noted: 2023-05-16

## 2023-05-16 LAB
ANION GAP SERPL CALCULATED.3IONS-SCNC: 12 MMOL/L (ref 3–16)
ANTI-XA UNFRAC HEPARIN: 0.27 IU/ML (ref 0.3–0.7)
BUN SERPL-MCNC: 14 MG/DL (ref 7–20)
CALCIUM SERPL-MCNC: 8.8 MG/DL (ref 8.3–10.6)
CHLORIDE SERPL-SCNC: 100 MMOL/L (ref 99–110)
CO2 SERPL-SCNC: 24 MMOL/L (ref 21–32)
CREAT SERPL-MCNC: 0.6 MG/DL (ref 0.6–1.2)
DEPRECATED RDW RBC AUTO: 15.1 % (ref 12.4–15.4)
EKG ATRIAL RATE: 85 BPM
EKG ATRIAL RATE: 95 BPM
EKG DIAGNOSIS: NORMAL
EKG DIAGNOSIS: NORMAL
EKG P AXIS: 51 DEGREES
EKG P AXIS: 53 DEGREES
EKG P-R INTERVAL: 166 MS
EKG P-R INTERVAL: 166 MS
EKG Q-T INTERVAL: 360 MS
EKG Q-T INTERVAL: 398 MS
EKG QRS DURATION: 90 MS
EKG QRS DURATION: 98 MS
EKG QTC CALCULATION (BAZETT): 452 MS
EKG QTC CALCULATION (BAZETT): 473 MS
EKG R AXIS: 46 DEGREES
EKG R AXIS: 49 DEGREES
EKG T AXIS: 56 DEGREES
EKG T AXIS: 59 DEGREES
EKG VENTRICULAR RATE: 85 BPM
EKG VENTRICULAR RATE: 95 BPM
EST. AVERAGE GLUCOSE BLD GHB EST-MCNC: 125.5 MG/DL
GFR SERPLBLD CREATININE-BSD FMLA CKD-EPI: >60 ML/MIN/{1.73_M2}
GLUCOSE SERPL-MCNC: 100 MG/DL (ref 70–99)
HBA1C MFR BLD: 6 %
HCT VFR BLD AUTO: 40.8 % (ref 36–48)
HGB BLD-MCNC: 13.5 G/DL (ref 12–16)
LV EF: 20 %
LVEF MODALITY: NORMAL
MCH RBC QN AUTO: 28.6 PG (ref 26–34)
MCHC RBC AUTO-ENTMCNC: 32.9 G/DL (ref 31–36)
MCV RBC AUTO: 86.9 FL (ref 80–100)
PLATELET # BLD AUTO: 212 K/UL (ref 135–450)
PMV BLD AUTO: 7.9 FL (ref 5–10.5)
POC ACT LR: 184 SEC
POTASSIUM SERPL-SCNC: 3.8 MMOL/L (ref 3.5–5.1)
RBC # BLD AUTO: 4.7 M/UL (ref 4–5.2)
SODIUM SERPL-SCNC: 136 MMOL/L (ref 136–145)
WBC # BLD AUTO: 10.4 K/UL (ref 4–11)

## 2023-05-16 PROCEDURE — 93010 ELECTROCARDIOGRAM REPORT: CPT | Performed by: INTERNAL MEDICINE

## 2023-05-16 PROCEDURE — 99222 1ST HOSP IP/OBS MODERATE 55: CPT | Performed by: SURGERY

## 2023-05-16 PROCEDURE — B2111ZZ FLUOROSCOPY OF MULTIPLE CORONARY ARTERIES USING LOW OSMOLAR CONTRAST: ICD-10-PCS | Performed by: INTERNAL MEDICINE

## 2023-05-16 PROCEDURE — C1894 INTRO/SHEATH, NON-LASER: HCPCS

## 2023-05-16 PROCEDURE — B2151ZZ FLUOROSCOPY OF LEFT HEART USING LOW OSMOLAR CONTRAST: ICD-10-PCS | Performed by: INTERNAL MEDICINE

## 2023-05-16 PROCEDURE — 2060000000 HC ICU INTERMEDIATE R&B

## 2023-05-16 PROCEDURE — 6370000000 HC RX 637 (ALT 250 FOR IP): Performed by: NURSE PRACTITIONER

## 2023-05-16 PROCEDURE — 85027 COMPLETE CBC AUTOMATED: CPT

## 2023-05-16 PROCEDURE — 6360000002 HC RX W HCPCS: Performed by: INTERNAL MEDICINE

## 2023-05-16 PROCEDURE — 2500000003 HC RX 250 WO HCPCS

## 2023-05-16 PROCEDURE — 93005 ELECTROCARDIOGRAM TRACING: CPT | Performed by: INTERNAL MEDICINE

## 2023-05-16 PROCEDURE — 80048 BASIC METABOLIC PNL TOTAL CA: CPT

## 2023-05-16 PROCEDURE — 94640 AIRWAY INHALATION TREATMENT: CPT

## 2023-05-16 PROCEDURE — 85520 HEPARIN ASSAY: CPT

## 2023-05-16 PROCEDURE — 85347 COAGULATION TIME ACTIVATED: CPT

## 2023-05-16 PROCEDURE — 6360000002 HC RX W HCPCS

## 2023-05-16 PROCEDURE — 4A023N8 MEASUREMENT OF CARDIAC SAMPLING AND PRESSURE, BILATERAL, PERCUTANEOUS APPROACH: ICD-10-PCS | Performed by: INTERNAL MEDICINE

## 2023-05-16 PROCEDURE — 2580000003 HC RX 258: Performed by: INTERNAL MEDICINE

## 2023-05-16 PROCEDURE — 6370000000 HC RX 637 (ALT 250 FOR IP): Performed by: INTERNAL MEDICINE

## 2023-05-16 PROCEDURE — 93460 R&L HRT ART/VENTRICLE ANGIO: CPT

## 2023-05-16 PROCEDURE — 2709999900 HC NON-CHARGEABLE SUPPLY

## 2023-05-16 PROCEDURE — C1769 GUIDE WIRE: HCPCS

## 2023-05-16 PROCEDURE — 36415 COLL VENOUS BLD VENIPUNCTURE: CPT

## 2023-05-16 RX ORDER — ONDANSETRON 2 MG/ML
4 INJECTION INTRAMUSCULAR; INTRAVENOUS EVERY 6 HOURS PRN
Status: DISCONTINUED | OUTPATIENT
Start: 2023-05-16 | End: 2023-05-19 | Stop reason: HOSPADM

## 2023-05-16 RX ORDER — SODIUM CHLORIDE 0.9 % (FLUSH) 0.9 %
5-40 SYRINGE (ML) INJECTION PRN
Status: DISCONTINUED | OUTPATIENT
Start: 2023-05-16 | End: 2023-05-19

## 2023-05-16 RX ORDER — SODIUM CHLORIDE 0.9 % (FLUSH) 0.9 %
5-40 SYRINGE (ML) INJECTION PRN
Status: DISCONTINUED | OUTPATIENT
Start: 2023-05-16 | End: 2023-05-19 | Stop reason: HOSPADM

## 2023-05-16 RX ORDER — ACETAMINOPHEN 325 MG/1
650 TABLET ORAL EVERY 4 HOURS PRN
Status: DISCONTINUED | OUTPATIENT
Start: 2023-05-16 | End: 2023-05-18 | Stop reason: SDUPTHER

## 2023-05-16 RX ORDER — SODIUM CHLORIDE 9 MG/ML
INJECTION, SOLUTION INTRAVENOUS PRN
Status: DISCONTINUED | OUTPATIENT
Start: 2023-05-16 | End: 2023-05-19 | Stop reason: HOSPADM

## 2023-05-16 RX ORDER — LORAZEPAM 0.5 MG/1
0.5 TABLET ORAL
Status: ACTIVE | OUTPATIENT
Start: 2023-05-16 | End: 2023-05-17

## 2023-05-16 RX ORDER — SODIUM CHLORIDE 0.9 % (FLUSH) 0.9 %
5-40 SYRINGE (ML) INJECTION EVERY 12 HOURS SCHEDULED
Status: DISCONTINUED | OUTPATIENT
Start: 2023-05-16 | End: 2023-05-19

## 2023-05-16 RX ORDER — HEPARIN SODIUM 1000 [USP'U]/ML
INJECTION, SOLUTION INTRAVENOUS; SUBCUTANEOUS
Status: COMPLETED | OUTPATIENT
Start: 2023-05-16 | End: 2023-05-16

## 2023-05-16 RX ORDER — ASPIRIN 325 MG
325 TABLET ORAL ONCE
Status: COMPLETED | OUTPATIENT
Start: 2023-05-16 | End: 2023-05-16

## 2023-05-16 RX ORDER — SODIUM CHLORIDE 9 MG/ML
INJECTION, SOLUTION INTRAVENOUS PRN
Status: DISCONTINUED | OUTPATIENT
Start: 2023-05-16 | End: 2023-05-19

## 2023-05-16 RX ORDER — SODIUM CHLORIDE 0.9 % (FLUSH) 0.9 %
5-40 SYRINGE (ML) INJECTION EVERY 12 HOURS SCHEDULED
Status: DISCONTINUED | OUTPATIENT
Start: 2023-05-16 | End: 2023-05-19 | Stop reason: HOSPADM

## 2023-05-16 RX ORDER — MIDAZOLAM HYDROCHLORIDE 1 MG/ML
INJECTION INTRAMUSCULAR; INTRAVENOUS
Status: COMPLETED | OUTPATIENT
Start: 2023-05-16 | End: 2023-05-16

## 2023-05-16 RX ORDER — HEPARIN SODIUM 1000 [USP'U]/ML
2000 INJECTION, SOLUTION INTRAVENOUS; SUBCUTANEOUS ONCE
Status: COMPLETED | OUTPATIENT
Start: 2023-05-16 | End: 2023-05-16

## 2023-05-16 RX ORDER — FENTANYL CITRATE 50 UG/ML
INJECTION, SOLUTION INTRAMUSCULAR; INTRAVENOUS
Status: COMPLETED | OUTPATIENT
Start: 2023-05-16 | End: 2023-05-16

## 2023-05-16 RX ADMIN — FUROSEMIDE 40 MG: 40 TABLET ORAL at 15:06

## 2023-05-16 RX ADMIN — Medication 10 ML: at 21:08

## 2023-05-16 RX ADMIN — TIOTROPIUM BROMIDE AND OLODATEROL 2 PUFF: 3.124; 2.736 SPRAY, METERED RESPIRATORY (INHALATION) at 08:01

## 2023-05-16 RX ADMIN — FENTANYL CITRATE 25 MCG: 50 INJECTION, SOLUTION INTRAMUSCULAR; INTRAVENOUS at 10:26

## 2023-05-16 RX ADMIN — SACUBITRIL AND VALSARTAN 1 TABLET: 24; 26 TABLET, FILM COATED ORAL at 08:56

## 2023-05-16 RX ADMIN — ASPIRIN 325 MG: 325 TABLET ORAL at 08:56

## 2023-05-16 RX ADMIN — HEPARIN SODIUM 2000 UNITS: 1000 INJECTION INTRAVENOUS; SUBCUTANEOUS at 04:26

## 2023-05-16 RX ADMIN — MIDAZOLAM HYDROCHLORIDE 0.5 MG: 1 INJECTION INTRAMUSCULAR; INTRAVENOUS at 10:26

## 2023-05-16 RX ADMIN — PANTOPRAZOLE SODIUM 40 MG: 40 TABLET, DELAYED RELEASE ORAL at 08:57

## 2023-05-16 RX ADMIN — SACUBITRIL AND VALSARTAN 1 TABLET: 24; 26 TABLET, FILM COATED ORAL at 20:57

## 2023-05-16 RX ADMIN — CLOPIDOGREL BISULFATE 75 MG: 75 TABLET ORAL at 08:57

## 2023-05-16 RX ADMIN — Medication 10 ML: at 20:57

## 2023-05-16 RX ADMIN — ISOSORBIDE MONONITRATE 120 MG: 60 TABLET, EXTENDED RELEASE ORAL at 08:56

## 2023-05-16 RX ADMIN — HEPARIN SODIUM 5000 UNITS: 1000 INJECTION, SOLUTION INTRAVENOUS; SUBCUTANEOUS at 10:21

## 2023-05-16 RX ADMIN — PRAMIPEXOLE DIHYDROCHLORIDE 0.25 MG: 0.25 TABLET ORAL at 20:57

## 2023-05-16 ASSESSMENT — PAIN DESCRIPTION - ORIENTATION: ORIENTATION: RIGHT

## 2023-05-16 ASSESSMENT — PAIN SCALES - WONG BAKER
WONGBAKER_NUMERICALRESPONSE: 0
WONGBAKER_NUMERICALRESPONSE: 0

## 2023-05-16 ASSESSMENT — PAIN DESCRIPTION - DESCRIPTORS: DESCRIPTORS: ACHING

## 2023-05-16 ASSESSMENT — PAIN DESCRIPTION - LOCATION: LOCATION: LEG

## 2023-05-16 ASSESSMENT — PAIN SCALES - GENERAL
PAINLEVEL_OUTOF10: 0

## 2023-05-17 ENCOUNTER — APPOINTMENT (OUTPATIENT)
Dept: VASCULAR LAB | Age: 68
DRG: 233 | End: 2023-05-17
Attending: INTERNAL MEDICINE
Payer: MEDICARE

## 2023-05-17 ENCOUNTER — ANESTHESIA EVENT (OUTPATIENT)
Dept: OPERATING ROOM | Age: 68
End: 2023-05-17
Payer: MEDICARE

## 2023-05-17 LAB
ANION GAP SERPL CALCULATED.3IONS-SCNC: 11 MMOL/L (ref 3–16)
BILIRUB UR QL STRIP.AUTO: NEGATIVE
BUN SERPL-MCNC: 14 MG/DL (ref 7–20)
CALCIUM SERPL-MCNC: 9.2 MG/DL (ref 8.3–10.6)
CHLORIDE SERPL-SCNC: 101 MMOL/L (ref 99–110)
CLARITY UR: CLEAR
CO2 SERPL-SCNC: 27 MMOL/L (ref 21–32)
COLOR UR: YELLOW
CREAT SERPL-MCNC: 0.7 MG/DL (ref 0.6–1.2)
DEPRECATED RDW RBC AUTO: 15.5 % (ref 12.4–15.4)
GFR SERPLBLD CREATININE-BSD FMLA CKD-EPI: >60 ML/MIN/{1.73_M2}
GLUCOSE SERPL-MCNC: 120 MG/DL (ref 70–99)
GLUCOSE UR STRIP.AUTO-MCNC: NEGATIVE MG/DL
HCT VFR BLD AUTO: 41.9 % (ref 36–48)
HGB BLD-MCNC: 14 G/DL (ref 12–16)
HGB UR QL STRIP.AUTO: NEGATIVE
KETONES UR STRIP.AUTO-MCNC: NEGATIVE MG/DL
LEUKOCYTE ESTERASE UR QL STRIP.AUTO: NEGATIVE
MCH RBC QN AUTO: 28.9 PG (ref 26–34)
MCHC RBC AUTO-ENTMCNC: 33.4 G/DL (ref 31–36)
MCV RBC AUTO: 86.7 FL (ref 80–100)
NITRITE UR QL STRIP.AUTO: NEGATIVE
PH UR STRIP.AUTO: 7 [PH] (ref 5–8)
PLATELET # BLD AUTO: 249 K/UL (ref 135–450)
PMV BLD AUTO: 7.5 FL (ref 5–10.5)
POC ACT LR: 250 SEC
POTASSIUM SERPL-SCNC: 4 MMOL/L (ref 3.5–5.1)
POTASSIUM SERPL-SCNC: 4 MMOL/L (ref 3.5–5.1)
PROT UR STRIP.AUTO-MCNC: NEGATIVE MG/DL
RBC # BLD AUTO: 4.84 M/UL (ref 4–5.2)
SODIUM SERPL-SCNC: 139 MMOL/L (ref 136–145)
SP GR UR STRIP.AUTO: 1.01 (ref 1–1.03)
TSH SERPL DL<=0.005 MIU/L-ACNC: 2.51 UIU/ML (ref 0.27–4.2)
UA COMPLETE W REFLEX CULTURE PNL UR: NORMAL
UA DIPSTICK W REFLEX MICRO PNL UR: NORMAL
URN SPEC COLLECT METH UR: NORMAL
UROBILINOGEN UR STRIP-ACNC: 0.2 E.U./DL
WBC # BLD AUTO: 9.4 K/UL (ref 4–11)

## 2023-05-17 PROCEDURE — 93925 LOWER EXTREMITY STUDY: CPT

## 2023-05-17 PROCEDURE — 2580000003 HC RX 258: Performed by: INTERNAL MEDICINE

## 2023-05-17 PROCEDURE — 6370000000 HC RX 637 (ALT 250 FOR IP): Performed by: NURSE PRACTITIONER

## 2023-05-17 PROCEDURE — 99233 SBSQ HOSP IP/OBS HIGH 50: CPT | Performed by: INTERNAL MEDICINE

## 2023-05-17 PROCEDURE — 94640 AIRWAY INHALATION TREATMENT: CPT

## 2023-05-17 PROCEDURE — 81003 URINALYSIS AUTO W/O SCOPE: CPT

## 2023-05-17 PROCEDURE — 85027 COMPLETE CBC AUTOMATED: CPT

## 2023-05-17 PROCEDURE — 2060000000 HC ICU INTERMEDIATE R&B

## 2023-05-17 PROCEDURE — 84443 ASSAY THYROID STIM HORMONE: CPT

## 2023-05-17 PROCEDURE — 6360000002 HC RX W HCPCS: Performed by: INTERNAL MEDICINE

## 2023-05-17 PROCEDURE — 80048 BASIC METABOLIC PNL TOTAL CA: CPT

## 2023-05-17 PROCEDURE — 36415 COLL VENOUS BLD VENIPUNCTURE: CPT

## 2023-05-17 RX ORDER — AMIODARONE HYDROCHLORIDE 200 MG/1
400 TABLET ORAL 2 TIMES DAILY
Status: COMPLETED | OUTPATIENT
Start: 2023-05-17 | End: 2023-05-18

## 2023-05-17 RX ORDER — M-VIT,TX,IRON,MINS/CALC/FOLIC 27MG-0.4MG
1 TABLET ORAL DAILY
Status: DISCONTINUED | OUTPATIENT
Start: 2023-05-17 | End: 2023-05-19

## 2023-05-17 RX ORDER — ENOXAPARIN SODIUM 100 MG/ML
40 INJECTION SUBCUTANEOUS DAILY
Status: DISCONTINUED | OUTPATIENT
Start: 2023-05-17 | End: 2023-05-19

## 2023-05-17 RX ORDER — ASCORBIC ACID 500 MG
500 TABLET ORAL 3 TIMES DAILY
Status: DISCONTINUED | OUTPATIENT
Start: 2023-05-17 | End: 2023-05-19

## 2023-05-17 RX ADMIN — ISOSORBIDE MONONITRATE 120 MG: 60 TABLET, EXTENDED RELEASE ORAL at 08:51

## 2023-05-17 RX ADMIN — Medication 10 ML: at 20:52

## 2023-05-17 RX ADMIN — SACUBITRIL AND VALSARTAN 1 TABLET: 24; 26 TABLET, FILM COATED ORAL at 08:50

## 2023-05-17 RX ADMIN — SACUBITRIL AND VALSARTAN 1 TABLET: 24; 26 TABLET, FILM COATED ORAL at 20:51

## 2023-05-17 RX ADMIN — ACETAMINOPHEN 650 MG: 325 TABLET ORAL at 20:52

## 2023-05-17 RX ADMIN — FUROSEMIDE 40 MG: 40 TABLET ORAL at 08:51

## 2023-05-17 RX ADMIN — ENOXAPARIN SODIUM 40 MG: 100 INJECTION SUBCUTANEOUS at 13:54

## 2023-05-17 RX ADMIN — OXYCODONE HYDROCHLORIDE AND ACETAMINOPHEN 500 MG: 500 TABLET ORAL at 12:06

## 2023-05-17 RX ADMIN — PANTOPRAZOLE SODIUM 40 MG: 40 TABLET, DELAYED RELEASE ORAL at 08:50

## 2023-05-17 RX ADMIN — OXYCODONE HYDROCHLORIDE AND ACETAMINOPHEN 500 MG: 500 TABLET ORAL at 20:50

## 2023-05-17 RX ADMIN — AMIODARONE HYDROCHLORIDE 400 MG: 200 TABLET ORAL at 12:06

## 2023-05-17 RX ADMIN — Medication 10 ML: at 08:54

## 2023-05-17 RX ADMIN — AMIODARONE HYDROCHLORIDE 400 MG: 200 TABLET ORAL at 20:51

## 2023-05-17 RX ADMIN — MULTIPLE VITAMINS W/ MINERALS TAB 1 TABLET: TAB at 12:06

## 2023-05-17 RX ADMIN — TIOTROPIUM BROMIDE AND OLODATEROL 2 PUFF: 3.124; 2.736 SPRAY, METERED RESPIRATORY (INHALATION) at 07:38

## 2023-05-17 RX ADMIN — PRAMIPEXOLE DIHYDROCHLORIDE 0.25 MG: 0.25 TABLET ORAL at 20:51

## 2023-05-17 ASSESSMENT — PAIN SCALES - GENERAL
PAINLEVEL_OUTOF10: 0

## 2023-05-17 ASSESSMENT — PAIN SCALES - WONG BAKER
WONGBAKER_NUMERICALRESPONSE: 0
WONGBAKER_NUMERICALRESPONSE: 0

## 2023-05-18 ENCOUNTER — APPOINTMENT (OUTPATIENT)
Dept: VASCULAR LAB | Age: 68
DRG: 233 | End: 2023-05-18
Attending: INTERNAL MEDICINE
Payer: MEDICARE

## 2023-05-18 LAB
ABO + RH BLD: NORMAL
BLD GP AB SCN SERPL QL: NORMAL

## 2023-05-18 PROCEDURE — 2060000000 HC ICU INTERMEDIATE R&B

## 2023-05-18 PROCEDURE — 6360000002 HC RX W HCPCS: Performed by: INTERNAL MEDICINE

## 2023-05-18 PROCEDURE — 6370000000 HC RX 637 (ALT 250 FOR IP): Performed by: NURSE PRACTITIONER

## 2023-05-18 PROCEDURE — 94640 AIRWAY INHALATION TREATMENT: CPT

## 2023-05-18 PROCEDURE — 86923 COMPATIBILITY TEST ELECTRIC: CPT

## 2023-05-18 PROCEDURE — 93880 EXTRACRANIAL BILAT STUDY: CPT

## 2023-05-18 PROCEDURE — 86850 RBC ANTIBODY SCREEN: CPT

## 2023-05-18 PROCEDURE — 86900 BLOOD TYPING SEROLOGIC ABO: CPT

## 2023-05-18 PROCEDURE — 86901 BLOOD TYPING SEROLOGIC RH(D): CPT

## 2023-05-18 PROCEDURE — 6370000000 HC RX 637 (ALT 250 FOR IP)

## 2023-05-18 RX ORDER — ACETAMINOPHEN 500 MG
1000 TABLET ORAL ONCE
Status: COMPLETED | OUTPATIENT
Start: 2023-05-19 | End: 2023-05-19

## 2023-05-18 RX ORDER — SODIUM CHLORIDE, SODIUM LACTATE, POTASSIUM CHLORIDE, CALCIUM CHLORIDE 600; 310; 30; 20 MG/100ML; MG/100ML; MG/100ML; MG/100ML
INJECTION, SOLUTION INTRAVENOUS CONTINUOUS
Status: DISCONTINUED | OUTPATIENT
Start: 2023-05-19 | End: 2023-05-19

## 2023-05-18 RX ORDER — CHLORHEXIDINE GLUCONATE 4 G/100ML
SOLUTION TOPICAL 2 TIMES DAILY
Status: DISCONTINUED | OUTPATIENT
Start: 2023-05-18 | End: 2023-05-19 | Stop reason: HOSPADM

## 2023-05-18 RX ORDER — CEFAZOLIN SODIUM IN 0.9 % NACL 2 G/100 ML
2000 PLASTIC BAG, INJECTION (ML) INTRAVENOUS
Status: COMPLETED | OUTPATIENT
Start: 2023-05-19 | End: 2023-05-19

## 2023-05-18 RX ORDER — CHLORHEXIDINE GLUCONATE 0.12 MG/ML
15 RINSE ORAL ONCE
Status: COMPLETED | OUTPATIENT
Start: 2023-05-19 | End: 2023-05-19

## 2023-05-18 RX ORDER — BISACODYL 10 MG
10 SUPPOSITORY, RECTAL RECTAL ONCE
Status: DISCONTINUED | OUTPATIENT
Start: 2023-05-18 | End: 2023-05-19

## 2023-05-18 RX ORDER — ASPIRIN 81 MG/1
81 TABLET ORAL ONCE
Status: COMPLETED | OUTPATIENT
Start: 2023-05-19 | End: 2023-05-19

## 2023-05-18 RX ADMIN — PRAMIPEXOLE DIHYDROCHLORIDE 0.25 MG: 0.25 TABLET ORAL at 22:02

## 2023-05-18 RX ADMIN — TIOTROPIUM BROMIDE AND OLODATEROL 2 PUFF: 3.124; 2.736 SPRAY, METERED RESPIRATORY (INHALATION) at 08:36

## 2023-05-18 RX ADMIN — OXYCODONE HYDROCHLORIDE AND ACETAMINOPHEN 500 MG: 500 TABLET ORAL at 09:36

## 2023-05-18 RX ADMIN — OXYCODONE HYDROCHLORIDE AND ACETAMINOPHEN 500 MG: 500 TABLET ORAL at 22:02

## 2023-05-18 RX ADMIN — ISOSORBIDE MONONITRATE 120 MG: 60 TABLET, EXTENDED RELEASE ORAL at 09:35

## 2023-05-18 RX ADMIN — ENOXAPARIN SODIUM 40 MG: 100 INJECTION SUBCUTANEOUS at 09:36

## 2023-05-18 RX ADMIN — AMIODARONE HYDROCHLORIDE 400 MG: 200 TABLET ORAL at 09:35

## 2023-05-18 RX ADMIN — OXYCODONE HYDROCHLORIDE AND ACETAMINOPHEN 500 MG: 500 TABLET ORAL at 16:17

## 2023-05-18 RX ADMIN — CHLORHEXIDINE GLUCONATE: 213 SOLUTION TOPICAL at 23:12

## 2023-05-18 RX ADMIN — PANTOPRAZOLE SODIUM 40 MG: 40 TABLET, DELAYED RELEASE ORAL at 09:36

## 2023-05-18 RX ADMIN — MULTIPLE VITAMINS W/ MINERALS TAB 1 TABLET: TAB at 09:36

## 2023-05-18 RX ADMIN — AMIODARONE HYDROCHLORIDE 400 MG: 200 TABLET ORAL at 22:03

## 2023-05-18 RX ADMIN — FUROSEMIDE 40 MG: 40 TABLET ORAL at 09:36

## 2023-05-18 RX ADMIN — ACETAMINOPHEN 650 MG: 325 TABLET ORAL at 05:33

## 2023-05-18 RX ADMIN — SACUBITRIL AND VALSARTAN 1 TABLET: 24; 26 TABLET, FILM COATED ORAL at 09:35

## 2023-05-18 ASSESSMENT — PAIN SCALES - GENERAL
PAINLEVEL_OUTOF10: 0
PAINLEVEL_OUTOF10: 0
PAINLEVEL_OUTOF10: 3

## 2023-05-18 ASSESSMENT — PAIN DESCRIPTION - ORIENTATION: ORIENTATION: RIGHT

## 2023-05-18 ASSESSMENT — PAIN DESCRIPTION - LOCATION: LOCATION: LEG

## 2023-05-18 NOTE — DISCHARGE SUMMARY
Name:  Abdulkadir Aguirre  Room:  19/19  MRN:    [id number]    Discharge Summary      This discharge summary is in conjunction with a complete physical exam done on the day of discharge. Discharging Attending Physician: Dr. Jill Bales   Discharging Provider: Joann Platt PA-C       Admit: 5/14/2023  Discharge:  5/15/2023    HPI taken from admission H&P:    The patient is a 70 y.o. female with pmhx of CAD s/p stents,  COPD, HTN, HLD, CHF who presented to [facility] ED with complaint of not feeling well for the past month or so. Says she has progressively felt weaker more short of breath for the past couple of months. Did have an episode of chest pain today, took ASA and 3 nitro and it ultimately resolved. Chest pain located midsternally, did not radiate anywhere, associated with shortness of breath. Also has noticed lower extremity swelling. Was recently diagnosed with CHF and started lasix and entresto on Saturday. Pt also complaining of right lower extremity pain, numbness and then will occasionally get cold. Has had recurrent episodes of nausea, vomiting, epigastric pain.    Does smoke tobacco.      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

## 2023-05-19 ENCOUNTER — APPOINTMENT (OUTPATIENT)
Dept: GENERAL RADIOLOGY | Age: 68
DRG: 233 | End: 2023-05-19
Attending: INTERNAL MEDICINE
Payer: MEDICARE

## 2023-05-19 ENCOUNTER — ANESTHESIA (OUTPATIENT)
Dept: OPERATING ROOM | Age: 68
End: 2023-05-19
Payer: MEDICARE

## 2023-05-19 PROBLEM — M79.89 SWELLING OF BOTH LOWER EXTREMITIES: Status: ACTIVE | Noted: 2023-05-19

## 2023-05-19 LAB
ALBUMIN SERPL-MCNC: 3.3 G/DL (ref 3.4–5)
ALP SERPL-CCNC: 77 U/L (ref 40–129)
ALT SERPL-CCNC: 12 U/L (ref 10–40)
ANION GAP SERPL CALCULATED.3IONS-SCNC: 12 MMOL/L (ref 3–16)
ANION GAP SERPL CALCULATED.3IONS-SCNC: 13 MMOL/L (ref 3–16)
APTT BLD: 28 SEC (ref 22.7–35.9)
AST SERPL-CCNC: 13 U/L (ref 15–37)
BASE EXCESS BLDA CALC-SCNC: -1 MMOL/L (ref -3–3)
BASE EXCESS BLDA CALC-SCNC: -2 MMOL/L (ref -3–3)
BASE EXCESS BLDA CALC-SCNC: -2 MMOL/L (ref -3–3)
BASE EXCESS BLDA CALC-SCNC: -3 MMOL/L (ref -3–3)
BASE EXCESS BLDA CALC-SCNC: 0 MMOL/L (ref -3–3)
BASE EXCESS BLDA CALC-SCNC: 1 MMOL/L (ref -3–3)
BASE EXCESS BLDA CALC-SCNC: 2 MMOL/L (ref -3–3)
BASOPHILS # BLD: 0.1 K/UL (ref 0–0.2)
BASOPHILS NFR BLD: 0.6 %
BILIRUB DIRECT SERPL-MCNC: <0.2 MG/DL (ref 0–0.3)
BILIRUB INDIRECT SERPL-MCNC: ABNORMAL MG/DL (ref 0–1)
BILIRUB SERPL-MCNC: <0.2 MG/DL (ref 0–1)
BUN SERPL-MCNC: 14 MG/DL (ref 7–20)
BUN SERPL-MCNC: 15 MG/DL (ref 7–20)
CA-I BLD-SCNC: 1 MMOL/L (ref 1.12–1.32)
CA-I BLD-SCNC: 1.06 MMOL/L (ref 1.12–1.32)
CA-I BLD-SCNC: 1.07 MMOL/L (ref 1.12–1.32)
CA-I BLD-SCNC: 1.16 MMOL/L (ref 1.12–1.32)
CA-I BLD-SCNC: 1.18 MMOL/L (ref 1.12–1.32)
CA-I BLD-SCNC: 1.19 MMOL/L (ref 1.12–1.32)
CA-I BLD-SCNC: 1.23 MMOL/L (ref 1.12–1.32)
CA-I BLD-SCNC: 1.34 MMOL/L (ref 1.12–1.32)
CALCIUM SERPL-MCNC: 8.6 MG/DL (ref 8.3–10.6)
CALCIUM SERPL-MCNC: 8.9 MG/DL (ref 8.3–10.6)
CHLORIDE SERPL-SCNC: 100 MMOL/L (ref 99–110)
CHLORIDE SERPL-SCNC: 102 MMOL/L (ref 99–110)
CHOLEST SERPL-MCNC: 276 MG/DL (ref 0–199)
CO2 BLDA-SCNC: 24 MMOL/L
CO2 BLDA-SCNC: 24 MMOL/L
CO2 BLDA-SCNC: 25 MMOL/L
CO2 BLDA-SCNC: 26 MMOL/L
CO2 BLDA-SCNC: 26 MMOL/L
CO2 BLDA-SCNC: 27 MMOL/L
CO2 BLDA-SCNC: 28 MMOL/L
CO2 SERPL-SCNC: 22 MMOL/L (ref 21–32)
CO2 SERPL-SCNC: 24 MMOL/L (ref 21–32)
CREAT SERPL-MCNC: 0.8 MG/DL (ref 0.6–1.2)
CREAT SERPL-MCNC: <0.5 MG/DL (ref 0.6–1.2)
DEPRECATED RDW RBC AUTO: 14.8 % (ref 12.4–15.4)
DEPRECATED RDW RBC AUTO: 15 % (ref 12.4–15.4)
EOSINOPHIL # BLD: 0 K/UL (ref 0–0.6)
EOSINOPHIL NFR BLD: 0 %
GFR SERPLBLD CREATININE-BSD FMLA CKD-EPI: >60 ML/MIN/{1.73_M2}
GFR SERPLBLD CREATININE-BSD FMLA CKD-EPI: >60 ML/MIN/{1.73_M2}
GLUCOSE BLD-MCNC: 112 MG/DL (ref 70–99)
GLUCOSE BLD-MCNC: 113 MG/DL (ref 70–99)
GLUCOSE BLD-MCNC: 116 MG/DL (ref 70–99)
GLUCOSE BLD-MCNC: 119 MG/DL (ref 70–99)
GLUCOSE BLD-MCNC: 120 MG/DL (ref 70–99)
GLUCOSE BLD-MCNC: 130 MG/DL (ref 70–99)
GLUCOSE BLD-MCNC: 131 MG/DL (ref 70–99)
GLUCOSE BLD-MCNC: 132 MG/DL (ref 70–99)
GLUCOSE BLD-MCNC: 134 MG/DL (ref 70–99)
GLUCOSE BLD-MCNC: 147 MG/DL (ref 70–99)
GLUCOSE BLD-MCNC: 150 MG/DL (ref 70–99)
GLUCOSE BLD-MCNC: 151 MG/DL (ref 70–99)
GLUCOSE BLD-MCNC: 153 MG/DL (ref 70–99)
GLUCOSE BLD-MCNC: 154 MG/DL (ref 70–99)
GLUCOSE BLD-MCNC: 164 MG/DL (ref 70–99)
GLUCOSE SERPL-MCNC: 115 MG/DL (ref 70–99)
GLUCOSE SERPL-MCNC: 163 MG/DL (ref 70–99)
HCO3 BLDA-SCNC: 22.8 MMOL/L (ref 21–29)
HCO3 BLDA-SCNC: 22.9 MMOL/L (ref 21–29)
HCO3 BLDA-SCNC: 23.2 MMOL/L (ref 21–29)
HCO3 BLDA-SCNC: 23.2 MMOL/L (ref 21–29)
HCO3 BLDA-SCNC: 23.6 MMOL/L (ref 21–29)
HCO3 BLDA-SCNC: 24.7 MMOL/L (ref 21–29)
HCO3 BLDA-SCNC: 24.8 MMOL/L (ref 21–29)
HCO3 BLDA-SCNC: 26 MMOL/L (ref 21–29)
HCO3 BLDA-SCNC: 26.6 MMOL/L (ref 21–29)
HCO3 BLDA-SCNC: 26.7 MMOL/L (ref 21–29)
HCO3 BLDA-SCNC: 26.7 MMOL/L (ref 21–29)
HCT VFR BLD AUTO: 24 % (ref 36–48)
HCT VFR BLD AUTO: 27 % (ref 36–48)
HCT VFR BLD AUTO: 28 % (ref 36–48)
HCT VFR BLD AUTO: 28 % (ref 36–48)
HCT VFR BLD AUTO: 36 % (ref 36–48)
HCT VFR BLD AUTO: 38 % (ref 36–48)
HCT VFR BLD AUTO: 38.6 % (ref 36–48)
HCT VFR BLD AUTO: 39 % (ref 36–48)
HCT VFR BLD AUTO: 40 % (ref 36–48)
HCT VFR BLD AUTO: 40 % (ref 36–48)
HDLC SERPL-MCNC: 41 MG/DL (ref 40–60)
HGB BLD CALC-MCNC: 12.2 GM/DL (ref 12–16)
HGB BLD CALC-MCNC: 12.9 GM/DL (ref 12–16)
HGB BLD CALC-MCNC: 13.2 GM/DL (ref 12–16)
HGB BLD CALC-MCNC: 13.7 GM/DL (ref 12–16)
HGB BLD CALC-MCNC: 8.3 GM/DL (ref 12–16)
HGB BLD CALC-MCNC: 9.3 GM/DL (ref 12–16)
HGB BLD CALC-MCNC: 9.4 GM/DL (ref 12–16)
HGB BLD CALC-MCNC: 9.4 GM/DL (ref 12–16)
HGB BLD-MCNC: 12.4 G/DL (ref 12–16)
HGB BLD-MCNC: 13.1 G/DL (ref 12–16)
INR PPP: 0.96 (ref 0.84–1.16)
INR PPP: 1.43 (ref 0.84–1.16)
LACTATE BLD-SCNC: 1.68 MMOL/L (ref 0.4–2)
LACTATE BLD-SCNC: 1.68 MMOL/L (ref 0.4–2)
LACTATE BLD-SCNC: 1.95 MMOL/L (ref 0.4–2)
LACTATE BLD-SCNC: 2.19 MMOL/L (ref 0.4–2)
LDLC SERPL CALC-MCNC: 207 MG/DL
LYMPHOCYTES # BLD: 1.5 K/UL (ref 1–5.1)
LYMPHOCYTES NFR BLD: 18.1 %
MAGNESIUM SERPL-MCNC: 2 MG/DL (ref 1.8–2.4)
MAGNESIUM SERPL-MCNC: 3.3 MG/DL (ref 1.8–2.4)
MCH RBC QN AUTO: 28 PG (ref 26–34)
MCH RBC QN AUTO: 28.2 PG (ref 26–34)
MCHC RBC AUTO-ENTMCNC: 32.2 G/DL (ref 31–36)
MCHC RBC AUTO-ENTMCNC: 32.6 G/DL (ref 31–36)
MCV RBC AUTO: 86.3 FL (ref 80–100)
MCV RBC AUTO: 86.9 FL (ref 80–100)
MONOCYTES # BLD: 0.5 K/UL (ref 0–1.3)
MONOCYTES NFR BLD: 5.5 %
NEUTROPHILS # BLD: 6.3 K/UL (ref 1.7–7.7)
NEUTROPHILS NFR BLD: 75.8 %
PCO2 BLDA: 40 MM HG (ref 35–45)
PCO2 BLDA: 42 MM HG (ref 35–45)
PCO2 BLDA: 42.5 MM HG (ref 35–45)
PCO2 BLDA: 43 MM HG (ref 35–45)
PCO2 BLDA: 44.5 MM HG (ref 35–45)
PCO2 BLDA: 44.7 MM HG (ref 35–45)
PCO2 BLDA: 46 MM HG (ref 35–45)
PCO2 BLDA: 46.4 MM HG (ref 35–45)
PCO2 BLDA: 46.8 MM HG (ref 35–45)
PCO2 BLDA: 47 MM HG (ref 35–45)
PCO2 BLDA: 47.5 MM HG (ref 35–45)
PERFORMED ON: ABNORMAL
PH BLDA: 7.3 [PH] (ref 7.35–7.45)
PH BLDA: 7.33 [PH] (ref 7.35–7.45)
PH BLDA: 7.34 [PH] (ref 7.35–7.45)
PH BLDA: 7.36 [PH] (ref 7.35–7.45)
PH BLDA: 7.37 [PH] (ref 7.35–7.45)
PH BLDA: 7.37 [PH] (ref 7.35–7.45)
PH BLDA: 7.41 [PH] (ref 7.35–7.45)
PH BLDV: 7.3 [PH] (ref 7.35–7.45)
PLATELET # BLD AUTO: 162 K/UL (ref 135–450)
PLATELET # BLD AUTO: 218 K/UL (ref 135–450)
PMV BLD AUTO: 7.4 FL (ref 5–10.5)
PMV BLD AUTO: 7.8 FL (ref 5–10.5)
PO2 BLDA: 242.1 MM HG (ref 75–108)
PO2 BLDA: 245.7 MM HG (ref 75–108)
PO2 BLDA: 297.2 MM HG (ref 75–108)
PO2 BLDA: 493.7 MM HG (ref 75–108)
PO2 BLDA: 547.2 MM HG (ref 75–108)
PO2 BLDA: 59.8 MM HG (ref 75–108)
PO2 BLDA: 66.6 MM HG (ref 75–108)
PO2 BLDA: 70.9 MM HG (ref 75–108)
PO2 BLDA: 72.2 MM HG (ref 75–108)
PO2 BLDA: 93.6 MM HG (ref 75–108)
PO2 BLDA: 99.6 MM HG (ref 75–108)
POC SAMPLE TYPE: ABNORMAL
POTASSIUM BLD-SCNC: 3.6 MMOL/L (ref 3.5–5.1)
POTASSIUM BLD-SCNC: 3.6 MMOL/L (ref 3.5–5.1)
POTASSIUM BLD-SCNC: 3.8 MMOL/L (ref 3.5–5.1)
POTASSIUM BLD-SCNC: 3.9 MMOL/L (ref 3.5–5.1)
POTASSIUM BLD-SCNC: 3.9 MMOL/L (ref 3.5–5.1)
POTASSIUM BLD-SCNC: 4 MMOL/L (ref 3.5–5.1)
POTASSIUM BLD-SCNC: 4.4 MMOL/L (ref 3.5–5.1)
POTASSIUM BLD-SCNC: 4.5 MMOL/L (ref 3.5–5.1)
POTASSIUM BLD-SCNC: 4.5 MMOL/L (ref 3.5–5.1)
POTASSIUM SERPL-SCNC: 3.7 MMOL/L (ref 3.5–5.1)
POTASSIUM SERPL-SCNC: 3.8 MMOL/L (ref 3.5–5.1)
PROT SERPL-MCNC: 6.2 G/DL (ref 6.4–8.2)
PROTHROMBIN TIME: 12.8 SEC (ref 11.5–14.8)
PROTHROMBIN TIME: 17.5 SEC (ref 11.5–14.8)
RBC # BLD AUTO: 4.45 M/UL (ref 4–5.2)
RBC # BLD AUTO: 4.64 M/UL (ref 4–5.2)
SAO2 % BLDA: 100 % (ref 93–100)
SAO2 % BLDA: 89 % (ref 93–100)
SAO2 % BLDA: 92 % (ref 93–100)
SAO2 % BLDA: 93 % (ref 93–100)
SAO2 % BLDA: 94 % (ref 93–100)
SAO2 % BLDA: 96 % (ref 93–100)
SAO2 % BLDA: 97 % (ref 93–100)
SODIUM BLD-SCNC: 136 MMOL/L (ref 136–145)
SODIUM BLD-SCNC: 137 MMOL/L (ref 136–145)
SODIUM BLD-SCNC: 137 MMOL/L (ref 136–145)
SODIUM BLD-SCNC: 141 MMOL/L (ref 136–145)
SODIUM BLD-SCNC: 141 MMOL/L (ref 136–145)
SODIUM BLD-SCNC: 143 MMOL/L (ref 136–145)
SODIUM BLD-SCNC: 150 MMOL/L (ref 136–145)
SODIUM SERPL-SCNC: 136 MMOL/L (ref 136–145)
SODIUM SERPL-SCNC: 137 MMOL/L (ref 136–145)
TRIGL SERPL-MCNC: 139 MG/DL (ref 0–150)
VLDLC SERPL CALC-MCNC: 28 MG/DL
WBC # BLD AUTO: 29.5 K/UL (ref 4–11)
WBC # BLD AUTO: 8.3 K/UL (ref 4–11)

## 2023-05-19 PROCEDURE — 33268 EXCL LAA OPN OTH PX ANY METH: CPT

## 2023-05-19 PROCEDURE — 85347 COAGULATION TIME ACTIVATED: CPT

## 2023-05-19 PROCEDURE — 2709999900 HC NON-CHARGEABLE SUPPLY: Performed by: THORACIC SURGERY (CARDIOTHORACIC VASCULAR SURGERY)

## 2023-05-19 PROCEDURE — 33508 ENDOSCOPIC VEIN HARVEST: CPT | Performed by: THORACIC SURGERY (CARDIOTHORACIC VASCULAR SURGERY)

## 2023-05-19 PROCEDURE — 2580000003 HC RX 258

## 2023-05-19 PROCEDURE — 33268 EXCL LAA OPN OTH PX ANY METH: CPT | Performed by: THORACIC SURGERY (CARDIOTHORACIC VASCULAR SURGERY)

## 2023-05-19 PROCEDURE — 6360000002 HC RX W HCPCS: Performed by: THORACIC SURGERY (CARDIOTHORACIC VASCULAR SURGERY)

## 2023-05-19 PROCEDURE — 33518 CABG ARTERY-VEIN TWO: CPT

## 2023-05-19 PROCEDURE — 85730 THROMBOPLASTIN TIME PARTIAL: CPT

## 2023-05-19 PROCEDURE — 6360000002 HC RX W HCPCS: Performed by: ANESTHESIOLOGY

## 2023-05-19 PROCEDURE — C9290 INJ, BUPIVACAINE LIPOSOME: HCPCS | Performed by: THORACIC SURGERY (CARDIOTHORACIC VASCULAR SURGERY)

## 2023-05-19 PROCEDURE — 36415 COLL VENOUS BLD VENIPUNCTURE: CPT

## 2023-05-19 PROCEDURE — 2720000010 HC SURG SUPPLY STERILE: Performed by: THORACIC SURGERY (CARDIOTHORACIC VASCULAR SURGERY)

## 2023-05-19 PROCEDURE — 85027 COMPLETE CBC AUTOMATED: CPT

## 2023-05-19 PROCEDURE — 06BP4ZZ EXCISION OF RIGHT SAPHENOUS VEIN, PERCUTANEOUS ENDOSCOPIC APPROACH: ICD-10-PCS | Performed by: THORACIC SURGERY (CARDIOTHORACIC VASCULAR SURGERY)

## 2023-05-19 PROCEDURE — 2500000003 HC RX 250 WO HCPCS: Performed by: THORACIC SURGERY (CARDIOTHORACIC VASCULAR SURGERY)

## 2023-05-19 PROCEDURE — 02HV33Z INSERTION OF INFUSION DEVICE INTO SUPERIOR VENA CAVA, PERCUTANEOUS APPROACH: ICD-10-PCS | Performed by: ANESTHESIOLOGY

## 2023-05-19 PROCEDURE — 85014 HEMATOCRIT: CPT

## 2023-05-19 PROCEDURE — B24BZZ4 ULTRASONOGRAPHY OF HEART WITH AORTA, TRANSESOPHAGEAL: ICD-10-PCS | Performed by: ANESTHESIOLOGY

## 2023-05-19 PROCEDURE — 80061 LIPID PANEL: CPT

## 2023-05-19 PROCEDURE — C1729 CATH, DRAINAGE: HCPCS | Performed by: THORACIC SURGERY (CARDIOTHORACIC VASCULAR SURGERY)

## 2023-05-19 PROCEDURE — 02100Z9 BYPASS CORONARY ARTERY, ONE ARTERY FROM LEFT INTERNAL MAMMARY, OPEN APPROACH: ICD-10-PCS | Performed by: THORACIC SURGERY (CARDIOTHORACIC VASCULAR SURGERY)

## 2023-05-19 PROCEDURE — 84295 ASSAY OF SERUM SODIUM: CPT

## 2023-05-19 PROCEDURE — 02L70CK OCCLUSION OF LEFT ATRIAL APPENDAGE WITH EXTRALUMINAL DEVICE, OPEN APPROACH: ICD-10-PCS | Performed by: THORACIC SURGERY (CARDIOTHORACIC VASCULAR SURGERY)

## 2023-05-19 PROCEDURE — 85610 PROTHROMBIN TIME: CPT

## 2023-05-19 PROCEDURE — 33518 CABG ARTERY-VEIN TWO: CPT | Performed by: THORACIC SURGERY (CARDIOTHORACIC VASCULAR SURGERY)

## 2023-05-19 PROCEDURE — 83735 ASSAY OF MAGNESIUM: CPT

## 2023-05-19 PROCEDURE — 71045 X-RAY EXAM CHEST 1 VIEW: CPT

## 2023-05-19 PROCEDURE — 82947 ASSAY GLUCOSE BLOOD QUANT: CPT

## 2023-05-19 PROCEDURE — 83036 HEMOGLOBIN GLYCOSYLATED A1C: CPT

## 2023-05-19 PROCEDURE — 2580000003 HC RX 258: Performed by: ANESTHESIOLOGY

## 2023-05-19 PROCEDURE — 021109W BYPASS CORONARY ARTERY, TWO ARTERIES FROM AORTA WITH AUTOLOGOUS VENOUS TISSUE, OPEN APPROACH: ICD-10-PCS | Performed by: THORACIC SURGERY (CARDIOTHORACIC VASCULAR SURGERY)

## 2023-05-19 PROCEDURE — 84132 ASSAY OF SERUM POTASSIUM: CPT

## 2023-05-19 PROCEDURE — 85025 COMPLETE CBC W/AUTO DIFF WBC: CPT

## 2023-05-19 PROCEDURE — 3600000008 HC SURGERY OHS BASE: Performed by: THORACIC SURGERY (CARDIOTHORACIC VASCULAR SURGERY)

## 2023-05-19 PROCEDURE — 80048 BASIC METABOLIC PNL TOTAL CA: CPT

## 2023-05-19 PROCEDURE — 2500000003 HC RX 250 WO HCPCS: Performed by: ANESTHESIOLOGY

## 2023-05-19 PROCEDURE — C9290 INJ, BUPIVACAINE LIPOSOME: HCPCS | Performed by: ANESTHESIOLOGY

## 2023-05-19 PROCEDURE — 6370000000 HC RX 637 (ALT 250 FOR IP)

## 2023-05-19 PROCEDURE — 3700000000 HC ANESTHESIA ATTENDED CARE: Performed by: THORACIC SURGERY (CARDIOTHORACIC VASCULAR SURGERY)

## 2023-05-19 PROCEDURE — 6360000002 HC RX W HCPCS

## 2023-05-19 PROCEDURE — 83605 ASSAY OF LACTIC ACID: CPT

## 2023-05-19 PROCEDURE — 2580000003 HC RX 258: Performed by: THORACIC SURGERY (CARDIOTHORACIC VASCULAR SURGERY)

## 2023-05-19 PROCEDURE — 6360000002 HC RX W HCPCS: Performed by: INTERNAL MEDICINE

## 2023-05-19 PROCEDURE — 2100000000 HC CCU R&B

## 2023-05-19 PROCEDURE — 33533 CABG ARTERIAL SINGLE: CPT | Performed by: THORACIC SURGERY (CARDIOTHORACIC VASCULAR SURGERY)

## 2023-05-19 PROCEDURE — 33533 CABG ARTERIAL SINGLE: CPT

## 2023-05-19 PROCEDURE — C1889 IMPLANT/INSERT DEVICE, NOC: HCPCS | Performed by: THORACIC SURGERY (CARDIOTHORACIC VASCULAR SURGERY)

## 2023-05-19 PROCEDURE — A4217 STERILE WATER/SALINE, 500 ML: HCPCS | Performed by: THORACIC SURGERY (CARDIOTHORACIC VASCULAR SURGERY)

## 2023-05-19 PROCEDURE — 82330 ASSAY OF CALCIUM: CPT

## 2023-05-19 PROCEDURE — 82803 BLOOD GASES ANY COMBINATION: CPT

## 2023-05-19 PROCEDURE — 3E0T3BZ INTRODUCTION OF ANESTHETIC AGENT INTO PERIPHERAL NERVES AND PLEXI, PERCUTANEOUS APPROACH: ICD-10-PCS | Performed by: ANESTHESIOLOGY

## 2023-05-19 PROCEDURE — 5A1221Z PERFORMANCE OF CARDIAC OUTPUT, CONTINUOUS: ICD-10-PCS | Performed by: THORACIC SURGERY (CARDIOTHORACIC VASCULAR SURGERY)

## 2023-05-19 PROCEDURE — 80076 HEPATIC FUNCTION PANEL: CPT

## 2023-05-19 PROCEDURE — 33508 ENDOSCOPIC VEIN HARVEST: CPT

## 2023-05-19 PROCEDURE — 3700000001 HC ADD 15 MINUTES (ANESTHESIA): Performed by: THORACIC SURGERY (CARDIOTHORACIC VASCULAR SURGERY)

## 2023-05-19 PROCEDURE — 3600000018 HC SURGERY OHS ADDTL 15MIN: Performed by: THORACIC SURGERY (CARDIOTHORACIC VASCULAR SURGERY)

## 2023-05-19 PROCEDURE — 94640 AIRWAY INHALATION TREATMENT: CPT

## 2023-05-19 PROCEDURE — 6370000000 HC RX 637 (ALT 250 FOR IP): Performed by: ANESTHESIOLOGY

## 2023-05-19 PROCEDURE — 76942 ECHO GUIDE FOR BIOPSY: CPT | Performed by: ANESTHESIOLOGY

## 2023-05-19 PROCEDURE — P9045 ALBUMIN (HUMAN), 5%, 250 ML: HCPCS | Performed by: THORACIC SURGERY (CARDIOTHORACIC VASCULAR SURGERY)

## 2023-05-19 PROCEDURE — 6370000000 HC RX 637 (ALT 250 FOR IP): Performed by: THORACIC SURGERY (CARDIOTHORACIC VASCULAR SURGERY)

## 2023-05-19 DEVICE — DEVICE OCCL W/ 35MM PRELD VCLIP FR LAA EXCLUSION SYS: Type: IMPLANTABLE DEVICE | Site: HEART | Status: FUNCTIONAL

## 2023-05-19 RX ORDER — DEXMEDETOMIDINE HYDROCHLORIDE 4 UG/ML
.1-1.5 INJECTION, SOLUTION INTRAVENOUS CONTINUOUS
Status: DISCONTINUED | OUTPATIENT
Start: 2023-05-19 | End: 2023-05-20

## 2023-05-19 RX ORDER — METOPROLOL TARTRATE 5 MG/5ML
2.5 INJECTION INTRAVENOUS EVERY 10 MIN PRN
Status: DISCONTINUED | OUTPATIENT
Start: 2023-05-19 | End: 2023-05-26 | Stop reason: HOSPADM

## 2023-05-19 RX ORDER — INSULIN LISPRO 100 [IU]/ML
0-12 INJECTION, SOLUTION INTRAVENOUS; SUBCUTANEOUS
Status: DISCONTINUED | OUTPATIENT
Start: 2023-05-22 | End: 2023-05-26 | Stop reason: HOSPADM

## 2023-05-19 RX ORDER — DEXAMETHASONE SODIUM PHOSPHATE 4 MG/ML
INJECTION, SOLUTION INTRA-ARTICULAR; INTRALESIONAL; INTRAMUSCULAR; INTRAVENOUS; SOFT TISSUE PRN
Status: DISCONTINUED | OUTPATIENT
Start: 2023-05-19 | End: 2023-05-19 | Stop reason: SDUPTHER

## 2023-05-19 RX ORDER — MIDAZOLAM HYDROCHLORIDE 1 MG/ML
1 INJECTION INTRAMUSCULAR; INTRAVENOUS
Status: DISCONTINUED | OUTPATIENT
Start: 2023-05-19 | End: 2023-05-20

## 2023-05-19 RX ORDER — LANOLIN ALCOHOL/MO/W.PET/CERES
400 CREAM (GRAM) TOPICAL 2 TIMES DAILY
Status: DISCONTINUED | OUTPATIENT
Start: 2023-05-20 | End: 2023-05-26 | Stop reason: HOSPADM

## 2023-05-19 RX ORDER — MIDAZOLAM HYDROCHLORIDE 1 MG/ML
INJECTION INTRAMUSCULAR; INTRAVENOUS PRN
Status: DISCONTINUED | OUTPATIENT
Start: 2023-05-19 | End: 2023-05-19 | Stop reason: SDUPTHER

## 2023-05-19 RX ORDER — POLYETHYLENE GLYCOL 3350 17 G/17G
17 POWDER, FOR SOLUTION ORAL DAILY
Status: DISCONTINUED | OUTPATIENT
Start: 2023-05-20 | End: 2023-05-22

## 2023-05-19 RX ORDER — FAMOTIDINE 10 MG/ML
20 INJECTION, SOLUTION INTRAVENOUS 2 TIMES DAILY
Status: DISCONTINUED | OUTPATIENT
Start: 2023-05-19 | End: 2023-05-20

## 2023-05-19 RX ORDER — ATORVASTATIN CALCIUM 40 MG/1
40 TABLET, FILM COATED ORAL NIGHTLY
Status: DISCONTINUED | OUTPATIENT
Start: 2023-05-20 | End: 2023-05-26 | Stop reason: HOSPADM

## 2023-05-19 RX ORDER — DEXTROSE MONOHYDRATE 100 MG/ML
INJECTION, SOLUTION INTRAVENOUS CONTINUOUS PRN
Status: DISCONTINUED | OUTPATIENT
Start: 2023-05-19 | End: 2023-05-26 | Stop reason: HOSPADM

## 2023-05-19 RX ORDER — OXYCODONE HYDROCHLORIDE 5 MG/1
5 TABLET ORAL EVERY 4 HOURS PRN
Status: DISCONTINUED | OUTPATIENT
Start: 2023-05-19 | End: 2023-05-26 | Stop reason: HOSPADM

## 2023-05-19 RX ORDER — BISACODYL 5 MG/1
5 TABLET, DELAYED RELEASE ORAL DAILY
Status: DISCONTINUED | OUTPATIENT
Start: 2023-05-20 | End: 2023-05-22

## 2023-05-19 RX ORDER — MILRINONE LACTATE 0.2 MG/ML
0.25 INJECTION, SOLUTION INTRAVENOUS CONTINUOUS
Status: DISCONTINUED | OUTPATIENT
Start: 2023-05-19 | End: 2023-05-21

## 2023-05-19 RX ORDER — MORPHINE SULFATE 2 MG/ML
2 INJECTION, SOLUTION INTRAMUSCULAR; INTRAVENOUS
Status: DISCONTINUED | OUTPATIENT
Start: 2023-05-19 | End: 2023-05-26 | Stop reason: HOSPADM

## 2023-05-19 RX ORDER — SODIUM CHLORIDE 0.9 % (FLUSH) 0.9 %
5-40 SYRINGE (ML) INJECTION PRN
Status: DISCONTINUED | OUTPATIENT
Start: 2023-05-19 | End: 2023-05-26 | Stop reason: HOSPADM

## 2023-05-19 RX ORDER — FUROSEMIDE 40 MG/1
40 TABLET ORAL 2 TIMES DAILY
Status: DISCONTINUED | OUTPATIENT
Start: 2023-05-22 | End: 2023-05-20

## 2023-05-19 RX ORDER — SODIUM CHLORIDE 9 MG/ML
INJECTION, SOLUTION INTRAVENOUS CONTINUOUS PRN
Status: DISCONTINUED | OUTPATIENT
Start: 2023-05-19 | End: 2023-05-19 | Stop reason: SDUPTHER

## 2023-05-19 RX ORDER — FAMOTIDINE 20 MG/1
20 TABLET, FILM COATED ORAL 2 TIMES DAILY
Status: DISCONTINUED | OUTPATIENT
Start: 2023-05-19 | End: 2023-05-26 | Stop reason: HOSPADM

## 2023-05-19 RX ORDER — ALBUTEROL SULFATE 2.5 MG/3ML
2.5 SOLUTION RESPIRATORY (INHALATION)
Status: DISCONTINUED | OUTPATIENT
Start: 2023-05-19 | End: 2023-05-26 | Stop reason: HOSPADM

## 2023-05-19 RX ORDER — CEFAZOLIN SODIUM IN 0.9 % NACL 2 G/100 ML
2000 PLASTIC BAG, INJECTION (ML) INTRAVENOUS EVERY 8 HOURS
Status: COMPLETED | OUTPATIENT
Start: 2023-05-19 | End: 2023-05-21

## 2023-05-19 RX ORDER — BUPIVACAINE HYDROCHLORIDE 2.5 MG/ML
INJECTION, SOLUTION EPIDURAL; INFILTRATION; INTRACAUDAL
Status: COMPLETED | OUTPATIENT
Start: 2023-05-19 | End: 2023-05-19

## 2023-05-19 RX ORDER — ROCURONIUM BROMIDE 10 MG/ML
INJECTION, SOLUTION INTRAVENOUS PRN
Status: DISCONTINUED | OUTPATIENT
Start: 2023-05-19 | End: 2023-05-19 | Stop reason: SDUPTHER

## 2023-05-19 RX ORDER — MEPERIDINE HYDROCHLORIDE 50 MG/ML
25 INJECTION INTRAMUSCULAR; INTRAVENOUS; SUBCUTANEOUS
Status: DISCONTINUED | OUTPATIENT
Start: 2023-05-19 | End: 2023-05-20

## 2023-05-19 RX ORDER — MORPHINE SULFATE 4 MG/ML
4 INJECTION, SOLUTION INTRAMUSCULAR; INTRAVENOUS
Status: DISCONTINUED | OUTPATIENT
Start: 2023-05-19 | End: 2023-05-26 | Stop reason: HOSPADM

## 2023-05-19 RX ORDER — FUROSEMIDE 10 MG/ML
40 INJECTION INTRAMUSCULAR; INTRAVENOUS 2 TIMES DAILY
Status: DISCONTINUED | OUTPATIENT
Start: 2023-05-20 | End: 2023-05-20

## 2023-05-19 RX ORDER — ASPIRIN 81 MG/1
81 TABLET ORAL DAILY
Status: DISCONTINUED | OUTPATIENT
Start: 2023-05-20 | End: 2023-05-26 | Stop reason: HOSPADM

## 2023-05-19 RX ORDER — HYDRALAZINE HYDROCHLORIDE 20 MG/ML
5 INJECTION INTRAMUSCULAR; INTRAVENOUS EVERY 5 MIN PRN
Status: DISCONTINUED | OUTPATIENT
Start: 2023-05-19 | End: 2023-05-26 | Stop reason: HOSPADM

## 2023-05-19 RX ORDER — INSULIN LISPRO 100 [IU]/ML
0-6 INJECTION, SOLUTION INTRAVENOUS; SUBCUTANEOUS NIGHTLY
Status: DISCONTINUED | OUTPATIENT
Start: 2023-05-22 | End: 2023-05-26 | Stop reason: HOSPADM

## 2023-05-19 RX ORDER — MILRINONE LACTATE 0.2 MG/ML
INJECTION, SOLUTION INTRAVENOUS CONTINUOUS PRN
Status: DISCONTINUED | OUTPATIENT
Start: 2023-05-19 | End: 2023-05-19 | Stop reason: SDUPTHER

## 2023-05-19 RX ORDER — PHENYLEPHRINE HCL IN 0.9% NACL 1 MG/10 ML
SYRINGE (ML) INTRAVENOUS PRN
Status: DISCONTINUED | OUTPATIENT
Start: 2023-05-19 | End: 2023-05-19 | Stop reason: SDUPTHER

## 2023-05-19 RX ORDER — ETOMIDATE 2 MG/ML
INJECTION INTRAVENOUS PRN
Status: DISCONTINUED | OUTPATIENT
Start: 2023-05-19 | End: 2023-05-19 | Stop reason: SDUPTHER

## 2023-05-19 RX ORDER — CHLORHEXIDINE GLUCONATE 0.12 MG/ML
15 RINSE ORAL 2 TIMES DAILY
Status: DISCONTINUED | OUTPATIENT
Start: 2023-05-19 | End: 2023-05-26 | Stop reason: HOSPADM

## 2023-05-19 RX ORDER — FONDAPARINUX SODIUM 2.5 MG/.5ML
2.5 INJECTION SUBCUTANEOUS DAILY
Status: DISCONTINUED | OUTPATIENT
Start: 2023-05-20 | End: 2023-05-26 | Stop reason: HOSPADM

## 2023-05-19 RX ORDER — CLOPIDOGREL BISULFATE 75 MG/1
75 TABLET ORAL DAILY
Status: DISCONTINUED | OUTPATIENT
Start: 2023-05-20 | End: 2023-05-26 | Stop reason: HOSPADM

## 2023-05-19 RX ORDER — SODIUM CHLORIDE 0.9 % (FLUSH) 0.9 %
5-40 SYRINGE (ML) INJECTION EVERY 12 HOURS SCHEDULED
Status: DISCONTINUED | OUTPATIENT
Start: 2023-05-19 | End: 2023-05-26 | Stop reason: HOSPADM

## 2023-05-19 RX ORDER — KETAMINE HYDROCHLORIDE 100 MG/ML
INJECTION INTRAMUSCULAR; INTRAVENOUS PRN
Status: DISCONTINUED | OUTPATIENT
Start: 2023-05-19 | End: 2023-05-19 | Stop reason: SDUPTHER

## 2023-05-19 RX ORDER — HEPARIN SODIUM 1000 [USP'U]/ML
INJECTION, SOLUTION INTRAVENOUS; SUBCUTANEOUS PRN
Status: DISCONTINUED | OUTPATIENT
Start: 2023-05-19 | End: 2023-05-19 | Stop reason: SDUPTHER

## 2023-05-19 RX ORDER — DEXTROSE AND SODIUM CHLORIDE 5; .45 G/100ML; G/100ML
INJECTION, SOLUTION INTRAVENOUS CONTINUOUS
Status: DISCONTINUED | OUTPATIENT
Start: 2023-05-19 | End: 2023-05-20

## 2023-05-19 RX ORDER — ACETAMINOPHEN 325 MG/1
650 TABLET ORAL EVERY 4 HOURS PRN
Status: DISCONTINUED | OUTPATIENT
Start: 2023-05-19 | End: 2023-05-26 | Stop reason: HOSPADM

## 2023-05-19 RX ORDER — ONDANSETRON 4 MG/1
4 TABLET, ORALLY DISINTEGRATING ORAL EVERY 8 HOURS PRN
Status: DISCONTINUED | OUTPATIENT
Start: 2023-05-19 | End: 2023-05-26 | Stop reason: HOSPADM

## 2023-05-19 RX ORDER — MILRINONE LACTATE 0.2 MG/ML
0.25 INJECTION, SOLUTION INTRAVENOUS CONTINUOUS
Status: DISCONTINUED | OUTPATIENT
Start: 2023-05-19 | End: 2023-05-19

## 2023-05-19 RX ORDER — FENTANYL CITRATE 0.05 MG/ML
INJECTION, SOLUTION INTRAMUSCULAR; INTRAVENOUS PRN
Status: DISCONTINUED | OUTPATIENT
Start: 2023-05-19 | End: 2023-05-19 | Stop reason: SDUPTHER

## 2023-05-19 RX ORDER — INSULIN GLARGINE 100 [IU]/ML
0.15 INJECTION, SOLUTION SUBCUTANEOUS NIGHTLY
Status: DISCONTINUED | OUTPATIENT
Start: 2023-05-21 | End: 2023-05-26 | Stop reason: HOSPADM

## 2023-05-19 RX ORDER — POTASSIUM CHLORIDE 29.8 MG/ML
20 INJECTION INTRAVENOUS PRN
Status: DISCONTINUED | OUTPATIENT
Start: 2023-05-19 | End: 2023-05-26 | Stop reason: HOSPADM

## 2023-05-19 RX ORDER — POTASSIUM CHLORIDE 750 MG/1
10 TABLET, EXTENDED RELEASE ORAL
Status: DISCONTINUED | OUTPATIENT
Start: 2023-05-20 | End: 2023-05-26 | Stop reason: HOSPADM

## 2023-05-19 RX ORDER — ONDANSETRON 2 MG/ML
4 INJECTION INTRAMUSCULAR; INTRAVENOUS EVERY 6 HOURS PRN
Status: DISCONTINUED | OUTPATIENT
Start: 2023-05-19 | End: 2023-05-26 | Stop reason: HOSPADM

## 2023-05-19 RX ORDER — MAGNESIUM SULFATE IN WATER 40 MG/ML
2000 INJECTION, SOLUTION INTRAVENOUS PRN
Status: DISCONTINUED | OUTPATIENT
Start: 2023-05-19 | End: 2023-05-26 | Stop reason: HOSPADM

## 2023-05-19 RX ORDER — AMINOCAPROIC ACID 250 MG/ML
INJECTION, SOLUTION INTRAVENOUS PRN
Status: DISCONTINUED | OUTPATIENT
Start: 2023-05-19 | End: 2023-05-19 | Stop reason: SDUPTHER

## 2023-05-19 RX ORDER — PROTAMINE SULFATE 10 MG/ML
50 INJECTION, SOLUTION INTRAVENOUS
Status: ACTIVE | OUTPATIENT
Start: 2023-05-19 | End: 2023-05-20

## 2023-05-19 RX ORDER — ALBUMIN, HUMAN INJ 5% 5 %
25 SOLUTION INTRAVENOUS PRN
Status: DISCONTINUED | OUTPATIENT
Start: 2023-05-19 | End: 2023-05-26 | Stop reason: HOSPADM

## 2023-05-19 RX ORDER — SODIUM CHLORIDE 9 MG/ML
INJECTION, SOLUTION INTRAVENOUS PRN
Status: DISCONTINUED | OUTPATIENT
Start: 2023-05-19 | End: 2023-05-26 | Stop reason: HOSPADM

## 2023-05-19 RX ORDER — PROTAMINE SULFATE 10 MG/ML
INJECTION, SOLUTION INTRAVENOUS PRN
Status: DISCONTINUED | OUTPATIENT
Start: 2023-05-19 | End: 2023-05-19 | Stop reason: SDUPTHER

## 2023-05-19 RX ADMIN — ROCURONIUM BROMIDE 50 MG: 10 SOLUTION INTRAVENOUS at 11:25

## 2023-05-19 RX ADMIN — POTASSIUM CHLORIDE 20 MEQ: 29.8 INJECTION, SOLUTION INTRAVENOUS at 12:51

## 2023-05-19 RX ADMIN — ROCURONIUM BROMIDE 100 MG: 10 SOLUTION INTRAVENOUS at 07:47

## 2023-05-19 RX ADMIN — DEXMEDETOMIDINE 0.8 MCG/KG/HR: 100 INJECTION, SOLUTION INTRAVENOUS at 11:04

## 2023-05-19 RX ADMIN — FENTANYL CITRATE 100 MCG: 50 INJECTION, SOLUTION INTRAMUSCULAR; INTRAVENOUS at 08:43

## 2023-05-19 RX ADMIN — MORPHINE SULFATE 4 MG: 4 INJECTION, SOLUTION INTRAMUSCULAR; INTRAVENOUS at 23:55

## 2023-05-19 RX ADMIN — AMINOCAPROIC ACID 1000 MG/HR: 250 INJECTION, SOLUTION INTRAVENOUS at 08:37

## 2023-05-19 RX ADMIN — HEPARIN SODIUM 34000 UNITS: 1000 INJECTION, SOLUTION INTRAVENOUS; SUBCUTANEOUS at 10:02

## 2023-05-19 RX ADMIN — ASPIRIN 81 MG: 81 TABLET, COATED ORAL at 05:08

## 2023-05-19 RX ADMIN — ASPIRIN 325 MG: 325 TABLET, COATED ORAL at 18:31

## 2023-05-19 RX ADMIN — POTASSIUM CHLORIDE 20 MEQ: 29.8 INJECTION, SOLUTION INTRAVENOUS at 13:56

## 2023-05-19 RX ADMIN — MORPHINE SULFATE 2 MG: 2 INJECTION, SOLUTION INTRAMUSCULAR; INTRAVENOUS at 17:21

## 2023-05-19 RX ADMIN — SODIUM CHLORIDE 4 UNITS/HR: 9 INJECTION, SOLUTION INTRAVENOUS at 10:25

## 2023-05-19 RX ADMIN — MIDAZOLAM HYDROCHLORIDE 2 MG: 2 INJECTION, SOLUTION INTRAMUSCULAR; INTRAVENOUS at 07:42

## 2023-05-19 RX ADMIN — MORPHINE SULFATE 4 MG: 4 INJECTION, SOLUTION INTRAMUSCULAR; INTRAVENOUS at 21:15

## 2023-05-19 RX ADMIN — MILRINONE LACTATE 0.25 MCG/KG/MIN: 0.2 INJECTION, SOLUTION INTRAVENOUS at 08:40

## 2023-05-19 RX ADMIN — ONDANSETRON 4 MG: 2 INJECTION INTRAMUSCULAR; INTRAVENOUS at 12:08

## 2023-05-19 RX ADMIN — FENTANYL CITRATE 100 MCG: 50 INJECTION, SOLUTION INTRAMUSCULAR; INTRAVENOUS at 09:17

## 2023-05-19 RX ADMIN — FENTANYL CITRATE 50 MCG: 50 INJECTION, SOLUTION INTRAMUSCULAR; INTRAVENOUS at 09:38

## 2023-05-19 RX ADMIN — ALBUTEROL SULFATE 2.5 MG: 2.5 SOLUTION RESPIRATORY (INHALATION) at 20:36

## 2023-05-19 RX ADMIN — SODIUM BICARBONATE 50 MEQ: 84 INJECTION, SOLUTION INTRAVENOUS at 12:57

## 2023-05-19 RX ADMIN — Medication 0.4 MCG/KG/HR: at 20:53

## 2023-05-19 RX ADMIN — POTASSIUM CHLORIDE 20 MEQ: 29.8 INJECTION, SOLUTION INTRAVENOUS at 16:09

## 2023-05-19 RX ADMIN — ETOMIDATE 14 MG: 2 INJECTION INTRAVENOUS at 07:46

## 2023-05-19 RX ADMIN — MIDAZOLAM HYDROCHLORIDE 2 MG: 2 INJECTION, SOLUTION INTRAMUSCULAR; INTRAVENOUS at 12:12

## 2023-05-19 RX ADMIN — SODIUM CHLORIDE, PRESERVATIVE FREE 10 ML: 5 INJECTION INTRAVENOUS at 20:21

## 2023-05-19 RX ADMIN — SODIUM CHLORIDE: 9 INJECTION, SOLUTION INTRAVENOUS at 07:40

## 2023-05-19 RX ADMIN — FENTANYL CITRATE 100 MCG: 50 INJECTION, SOLUTION INTRAMUSCULAR; INTRAVENOUS at 12:12

## 2023-05-19 RX ADMIN — SODIUM BICARBONATE 50 MEQ: 84 INJECTION, SOLUTION INTRAVENOUS at 16:18

## 2023-05-19 RX ADMIN — ACETAMINOPHEN 1000 MG: 500 TABLET ORAL at 05:08

## 2023-05-19 RX ADMIN — FENTANYL CITRATE 100 MCG: 50 INJECTION, SOLUTION INTRAMUSCULAR; INTRAVENOUS at 09:53

## 2023-05-19 RX ADMIN — INSULIN HUMAN 4 UNITS: 100 INJECTION, SOLUTION PARENTERAL at 10:25

## 2023-05-19 RX ADMIN — MORPHINE SULFATE 2 MG: 2 INJECTION, SOLUTION INTRAMUSCULAR; INTRAVENOUS at 18:29

## 2023-05-19 RX ADMIN — HEPARIN SODIUM 15000 UNITS: 1000 INJECTION, SOLUTION INTRAVENOUS; SUBCUTANEOUS at 10:11

## 2023-05-19 RX ADMIN — AMINOCAPROIC ACID 5000 MG: 250 INJECTION, SOLUTION INTRAVENOUS at 08:04

## 2023-05-19 RX ADMIN — Medication 2000 MG: at 08:24

## 2023-05-19 RX ADMIN — DEXTROSE AND SODIUM CHLORIDE: 5; 450 INJECTION, SOLUTION INTRAVENOUS at 16:12

## 2023-05-19 RX ADMIN — FENTANYL CITRATE 250 MCG: 50 INJECTION, SOLUTION INTRAMUSCULAR; INTRAVENOUS at 07:46

## 2023-05-19 RX ADMIN — OXYCODONE 5 MG: 5 TABLET ORAL at 18:29

## 2023-05-19 RX ADMIN — ALBUMIN (HUMAN) 12.5 G: 12.5 INJECTION, SOLUTION INTRAVENOUS at 14:51

## 2023-05-19 RX ADMIN — BUPIVACAINE 20 ML: 13.3 INJECTION, SUSPENSION, LIPOSOMAL INFILTRATION at 07:56

## 2023-05-19 RX ADMIN — FENTANYL CITRATE 50 MCG: 50 INJECTION, SOLUTION INTRAMUSCULAR; INTRAVENOUS at 11:25

## 2023-05-19 RX ADMIN — FAMOTIDINE 20 MG: 10 INJECTION, SOLUTION INTRAVENOUS at 20:18

## 2023-05-19 RX ADMIN — DEXAMETHASONE SODIUM PHOSPHATE 8 MG: 4 INJECTION, SOLUTION INTRAMUSCULAR; INTRAVENOUS at 11:04

## 2023-05-19 RX ADMIN — EPINEPHRINE 2 MCG/MIN: 1 INJECTION INTRAMUSCULAR; INTRAVENOUS; SUBCUTANEOUS at 08:56

## 2023-05-19 RX ADMIN — FENTANYL CITRATE 100 MCG: 50 INJECTION, SOLUTION INTRAMUSCULAR; INTRAVENOUS at 09:47

## 2023-05-19 RX ADMIN — KETAMINE HYDROCHLORIDE 50 MG: 100 INJECTION INTRAMUSCULAR; INTRAVENOUS at 11:54

## 2023-05-19 RX ADMIN — BUPIVACAINE HYDROCHLORIDE 20 ML: 2.5 INJECTION, SOLUTION EPIDURAL; INFILTRATION; INTRACAUDAL; PERINEURAL at 07:56

## 2023-05-19 RX ADMIN — ALBUTEROL SULFATE 2.5 MG: 2.5 SOLUTION RESPIRATORY (INHALATION) at 17:05

## 2023-05-19 RX ADMIN — VANCOMYCIN HYDROCHLORIDE 1000 MG: 1 INJECTION, POWDER, LYOPHILIZED, FOR SOLUTION INTRAVENOUS at 20:48

## 2023-05-19 RX ADMIN — Medication 2000 MG: at 17:05

## 2023-05-19 RX ADMIN — VANCOMYCIN HYDROCHLORIDE 1000 MG: 1 INJECTION, POWDER, LYOPHILIZED, FOR SOLUTION INTRAVENOUS at 08:24

## 2023-05-19 RX ADMIN — CHLORHEXIDINE GLUCONATE 15 ML: 1.2 RINSE ORAL at 20:18

## 2023-05-19 RX ADMIN — Medication 100 MCG: at 11:49

## 2023-05-19 RX ADMIN — Medication 200 MCG: at 08:04

## 2023-05-19 RX ADMIN — PROTAMINE SULFATE 450 MG: 10 INJECTION, SOLUTION INTRAVENOUS at 11:38

## 2023-05-19 RX ADMIN — SUGAMMADEX 200 MG: 100 INJECTION, SOLUTION INTRAVENOUS at 12:42

## 2023-05-19 RX ADMIN — MUPIROCIN: 20 OINTMENT TOPICAL at 05:09

## 2023-05-19 RX ADMIN — SODIUM CHLORIDE 2 MCG/MIN: 9 INJECTION, SOLUTION INTRAVENOUS at 08:12

## 2023-05-19 RX ADMIN — MIDAZOLAM HYDROCHLORIDE 2 MG: 2 INJECTION, SOLUTION INTRAMUSCULAR; INTRAVENOUS at 10:14

## 2023-05-19 RX ADMIN — KETAMINE HYDROCHLORIDE 50 MG: 100 INJECTION INTRAMUSCULAR; INTRAVENOUS at 07:46

## 2023-05-19 RX ADMIN — CHLORHEXIDINE GLUCONATE 15 ML: 1.2 RINSE ORAL at 05:09

## 2023-05-19 RX ADMIN — SODIUM CHLORIDE, SODIUM LACTATE, POTASSIUM CHLORIDE, AND CALCIUM CHLORIDE: .6; .31; .03; .02 INJECTION, SOLUTION INTRAVENOUS at 05:39

## 2023-05-19 RX ADMIN — MILRINONE LACTATE IN DEXTROSE 0.25 MCG/KG/MIN: 200 INJECTION, SOLUTION INTRAVENOUS at 22:33

## 2023-05-19 RX ADMIN — METOPROLOL TARTRATE 12.5 MG: 25 TABLET, FILM COATED ORAL at 05:06

## 2023-05-19 RX ADMIN — Medication 100 MCG: at 11:54

## 2023-05-19 RX ADMIN — ROCURONIUM BROMIDE 50 MG: 10 SOLUTION INTRAVENOUS at 09:38

## 2023-05-19 ASSESSMENT — PAIN DESCRIPTION - LOCATION
LOCATION: CHEST
LOCATION: STERNUM;INCISION
LOCATION: CHEST

## 2023-05-19 ASSESSMENT — PULMONARY FUNCTION TESTS
PIF_VALUE: 15
PIF_VALUE: 16
PIF_VALUE: 16
PIF_VALUE: 20
PIF_VALUE: 27
PIF_VALUE: 16
PIF_VALUE: 22
PIF_VALUE: 16
PIF_VALUE: 40
PIF_VALUE: 19

## 2023-05-19 ASSESSMENT — PAIN SCALES - GENERAL
PAINLEVEL_OUTOF10: 8
PAINLEVEL_OUTOF10: 6
PAINLEVEL_OUTOF10: 0
PAINLEVEL_OUTOF10: 6
PAINLEVEL_OUTOF10: 8
PAINLEVEL_OUTOF10: 8

## 2023-05-19 ASSESSMENT — PAIN DESCRIPTION - ORIENTATION
ORIENTATION: MID

## 2023-05-19 ASSESSMENT — PAIN DESCRIPTION - DESCRIPTORS
DESCRIPTORS: SHARP
DESCRIPTORS: ACHING;DISCOMFORT;STABBING
DESCRIPTORS: ACHING;TENDER
DESCRIPTORS: DISCOMFORT;ACHING;SHARP
DESCRIPTORS: ACHING;DISCOMFORT;SHARP

## 2023-05-19 ASSESSMENT — PAIN DESCRIPTION - FREQUENCY
FREQUENCY: CONTINUOUS

## 2023-05-19 ASSESSMENT — PAIN DESCRIPTION - PAIN TYPE
TYPE: SURGICAL PAIN

## 2023-05-19 ASSESSMENT — PAIN DESCRIPTION - ONSET
ONSET: ON-GOING

## 2023-05-19 ASSESSMENT — PAIN SCALES - WONG BAKER
WONGBAKER_NUMERICALRESPONSE: 0

## 2023-05-19 NOTE — ANESTHESIA PROCEDURE NOTES
Peripheral Block    Patient location during procedure: OR  Reason for block: post-op pain management and at surgeon's request  Start time: 5/19/2023 7:56 AM  End time: 5/19/2023 8:00 AM  Staffing  Performed: anesthesiologist   Anesthesiologist: Casi Conte MD  Preanesthetic Checklist  Completed: patient identified, IV checked, site marked, risks and benefits discussed, surgical/procedural consents, equipment checked, pre-op evaluation, timeout performed, anesthesia consent given, oxygen available and monitors applied/VS acknowledged  Peripheral Block   Patient position: sitting  Prep: ChloraPrep  Provider prep: mask and sterile gloves  Patient monitoring: cardiac monitor, continuous pulse ox, frequent blood pressure checks and IV access  Block type: PECS II  Laterality: bilateral  Injection technique: single-shot  Guidance: ultrasound guided  Local infiltration: lidocaine  Infiltration strength: 1 %  Local infiltration: lidocaine  Dose: 3 mL    Needle   Needle type: insulated echogenic nerve stimulator needle   Needle gauge: 21 G  Needle localization: ultrasound guidance  Needle length: 10 cm  Assessment   Injection assessment: negative aspiration for heme, no paresthesia on injection and local visualized surrounding nerve on ultrasound  Paresthesia pain: none  Slow fractionated injection: yes  Hemodynamics: stable  Real-time US image taken/store: yes  Outcomes: patient tolerated procedure well    Medications Administered  bupivacaine (MARCAINE) PF injection 0.25% - Perineural   20 mL - 5/19/2023 7:56:00 AM  bupivacaine liposome (EXPAREL) injection 1.3% - Perineural   20 mL - 5/19/2023 7:56:00 AM

## 2023-05-19 NOTE — ANESTHESIA PROCEDURE NOTES
Central Venous Line:    A central venous line was placed using surface landmarks, in the OR for the following indication(s): central venous access and CVP monitoring. 5/19/2023 7:51 AM5/19/2023 7:56 AM    Sterility preparation included the following: hand hygiene performed prior to procedure, maximum sterile barriers used and sterile technique used to drape from head to toe. The patient was placed in Trendelenburg position. The right internal jugular vein was prepped. The site was prepped with Chloraprep. A 9 Fr (size), 10 (length), introducer triple lumen was placed. During the procedure, the following specific steps were taken: target vein identified, needle advanced into vein and blood aspirated and guidewire advanced into vein. Intravenous verification was obtained by venous blood return. Post insertion care included: all ports aspirated, all ports flushed easily, guidewire removed intact, Biopatch applied, line sutured in place and dressing applied. During the procedure the patient experienced: patient tolerated procedure well with no complications and EBL < 5mL.       Outcomes: uncomplicated and patient tolerated procedure wellno  Anesthesia type: general..No  Staffing  Performed: Anesthesiologist   Anesthesiologist: Selvin Valdovinos MD  Preanesthetic Checklist  Completed: patient identified, timeout performed and monitors applied/VS acknowledged

## 2023-05-19 NOTE — ANESTHESIA PRE PROCEDURE
Department of Anesthesiology  Preprocedure Note       Name:  Gage Morrissey   Age:  70 y.o.  :  1955                                          MRN:  [id number]         Date:  2023      Surgeon: Colten Parmar):  Philip Remy MD    Procedure: Procedure(s):  CORONARY ARTERY BYPASS GRAFTING TIMES THREE TO FOUR, INTERNAL MAMMARY ARTERY SAPHENOUS VEING GRAFT, ON PUMP WITH LEFT ATRIAL APPENDAGE CLIP PLACEMENT, POSSIBLE 5.5 IMPELLA NOTE:  BILATERAL PECTORALIS BLOCK YAZMIN    Medications prior to admission:   Prior to Admission medications    Medication Sig Start Date End Date Taking? Authorizing Provider   HYDROcodone-acetaminophen (NORCO) 5-325 MG per tablet Take 1 tablet by mouth every 6 hours as needed for Pain.  Max Daily Amount: 4 tablets   Yes Historical Provider, MD   ibuprofen (ADVIL;MOTRIN) 800 MG tablet Take 1 tablet by mouth every 6 hours as needed for Pain   Yes Historical Provider, MD   isosorbide mononitrate (IMDUR) 120 MG extended release tablet Take 1 tablet by mouth daily    Historical Provider, MD   pantoprazole (PROTONIX) 40 MG tablet Take 1 tablet by mouth daily    Historical Provider, MD   pramipexole (MIRAPEX) 0.25 MG tablet Take 1 tablet by mouth    Historical Provider, MD   furosemide (LASIX) 40 MG tablet Take 1 tablet by mouth daily 23   VEL Miranda CNP   sacubitril-valsartan (ENTRESTO) 24-26 MG per tablet Take 1 tablet by mouth 2 times daily 23   VEL Miranda CNP   tiotropium-olodaterol (STIOLTO RESPIMAT) 2.5-2.5 MCG/ACT AERS Inhale 2 puffs into the lungs daily 23   Yulia Marina MD   Multiple Vitamins-Minerals (MULTIVITAMIN ADULTS PO) Take by mouth    Historical Provider, MD   clopidogrel (PLAVIX) 75 MG tablet Take 1 tablet by mouth daily    Historical Provider, MD       Current medications:    Current Facility-Administered Medications   Medication Dose Route Frequency Provider Last Rate Last Admin    Del Nido Cardioplegic Solution   Perfusion Once Mohi O

## 2023-05-19 NOTE — ANESTHESIA POSTPROCEDURE EVALUATION
Department of Anesthesiology  Postprocedure Note    Patient: Judith Dudley  MRN: [id number]  YOB: 1955  Date of evaluation: 5/19/2023      Procedure Summary     Date: 05/19/23 Room / Location: [facility] [address] [place] [address]    Anesthesia Start: 0740 Anesthesia Stop: 2086    Procedure: CORONARY ARTERY BYPASS GRAFTING TIMES THREE, INTERNAL MAMMARY ARTERY SAPHENOUS VEING GRAFT, ON PUMP WITH LEFT ATRIAL APPENDAGE CLIP PLACEMENT, POSSIBLE 5.5 IMPELLA NOTE:  BILATERAL PECTORALIS BLOCK YAZMIN (Chest) Diagnosis:       Coronary artery disease involving native coronary artery of native heart without angina pectoris      (NSTEMI, SEVERE MULTIVESSEL CORONARY ARTERY DISEASE)    Surgeons: James Mae MD Responsible Provider: Hussein Hernandez MD    Anesthesia Type: general ASA Status: 4          Anesthesia Type: No value filed.     Yrn Phase I:      Yrn Phase II:        Anesthesia Post Evaluation    Patient location during evaluation: ICU  Patient participation: complete - patient cannot participate  Level of consciousness: sedated and ventilated  Pain score: 0  Airway patency: patent  Nausea & Vomiting: no nausea and no vomiting  Complications: no  Cardiovascular status: vasoactive/inotropes  Respiratory status: acceptable  Hydration status: stable

## 2023-05-19 NOTE — ANESTHESIA PROCEDURE NOTES
Procedure Performed: YAZMIN       Start Time:  5/19/2023 8:25 AM       End Time:   5/19/2023 8:35 AM    Preanesthesia Checklist:  Patient identified, IV assessed, risks and benefits discussed, monitors and equipment assessed, procedure being performed at surgeon's request and anesthesia consent obtained. General Procedure Information  Diagnostic Indications for Echo:  hemodynamic monitoring  Physician Requesting Echo: Gabby Morejon MD  Location performed:  OR  Intubated  Bite block not placed  Heart visualized  Probe Insertion:  Easy  Probe Type:  3D  Modalities:  2D only    Echocardiographic and Doppler Measurements    Ventricles    Right Ventricle:  Cavity size normal.  Hypertrophy not present. Thrombus not present. Global function moderately impaired. Left Ventricle:  Cavity size normal.  Hypertrophy not present. Thrombus not present. Global Function severely impaired. Ejection Fraction 15%. Valves    Aortic Valve: Annulus normal.  Stenosis not present. Regurgitation none. Leaflets normal.  Leaflet motions normal.      Mitral Valve: Annulus normal.  Stenosis not present. Regurgitation moderate. Leaflets normal.  Leaflet motions restricted. Specific leaflet segments with abnormal motions are described in the following comments:       posterior    Tricuspid Valve: Annulus normal.  Stenosis not present. Regurgitation mild. Leaflet motions normal.    Pulmonic Valve: Annulus normal.  Stenosis not present. Regurgitation none.         Aorta    Ascending Aorta:  Size normal.    Aortic Arch:  Size normal.    Descending Aorta:  Size normal.        Atria    Right Atrium:  Size normal.    Left Atrium:  Size normal.  Left atrial appendage normal.      Septa    Atrial Septum:  Intra-atrial septal morphology normal.      Other atrial septal defect findings:       Bubble study performed - negative    Ventricular Septum:  Intra-ventricular septum morphology normal.          Other

## 2023-05-20 ENCOUNTER — APPOINTMENT (OUTPATIENT)
Dept: GENERAL RADIOLOGY | Age: 68
DRG: 233 | End: 2023-05-20
Attending: INTERNAL MEDICINE
Payer: MEDICARE

## 2023-05-20 LAB
ANION GAP SERPL CALCULATED.3IONS-SCNC: 8 MMOL/L (ref 3–16)
BASE EXCESS BLDA CALC-SCNC: 1 MMOL/L (ref -3–3)
BASE EXCESS BLDA CALC-SCNC: 2 MMOL/L (ref -3–3)
BUN SERPL-MCNC: 16 MG/DL (ref 7–20)
CA-I BLD-SCNC: 1.23 MMOL/L (ref 1.12–1.32)
CALCIUM SERPL-MCNC: 8.4 MG/DL (ref 8.3–10.6)
CHLORIDE SERPL-SCNC: 107 MMOL/L (ref 99–110)
CO2 BLDA-SCNC: 28 MMOL/L
CO2 BLDA-SCNC: 30 MMOL/L
CO2 SERPL-SCNC: 25 MMOL/L (ref 21–32)
CREAT SERPL-MCNC: 0.6 MG/DL (ref 0.6–1.2)
DEPRECATED RDW RBC AUTO: 15.4 % (ref 12.4–15.4)
EST. AVERAGE GLUCOSE BLD GHB EST-MCNC: 128.4 MG/DL
GFR SERPLBLD CREATININE-BSD FMLA CKD-EPI: >60 ML/MIN/{1.73_M2}
GLUCOSE BLD-MCNC: 101 MG/DL (ref 70–99)
GLUCOSE BLD-MCNC: 103 MG/DL (ref 70–99)
GLUCOSE BLD-MCNC: 105 MG/DL (ref 70–99)
GLUCOSE BLD-MCNC: 109 MG/DL (ref 70–99)
GLUCOSE BLD-MCNC: 111 MG/DL (ref 70–99)
GLUCOSE BLD-MCNC: 113 MG/DL (ref 70–99)
GLUCOSE BLD-MCNC: 115 MG/DL (ref 70–99)
GLUCOSE BLD-MCNC: 116 MG/DL (ref 70–99)
GLUCOSE BLD-MCNC: 117 MG/DL (ref 70–99)
GLUCOSE BLD-MCNC: 124 MG/DL (ref 70–99)
GLUCOSE BLD-MCNC: 128 MG/DL (ref 70–99)
GLUCOSE BLD-MCNC: 142 MG/DL (ref 70–99)
GLUCOSE BLD-MCNC: 99 MG/DL (ref 70–99)
GLUCOSE SERPL-MCNC: 107 MG/DL (ref 70–99)
HBA1C MFR BLD: 6.1 %
HCO3 BLDA-SCNC: 26.8 MMOL/L (ref 21–29)
HCO3 BLDA-SCNC: 28.1 MMOL/L (ref 21–29)
HCT VFR BLD AUTO: 35.8 % (ref 36–48)
HCT VFR BLD AUTO: 36 % (ref 36–48)
HCT VFR BLD AUTO: 37 % (ref 36–48)
HGB BLD CALC-MCNC: 12.2 GM/DL (ref 12–16)
HGB BLD CALC-MCNC: 12.5 GM/DL (ref 12–16)
HGB BLD-MCNC: 11.4 G/DL (ref 12–16)
INR PPP: 1.1 (ref 0.84–1.16)
LACTATE BLD-SCNC: 0.7 MMOL/L (ref 0.4–2)
MAGNESIUM SERPL-MCNC: 2.1 MG/DL (ref 1.8–2.4)
MAGNESIUM SERPL-MCNC: 2.5 MG/DL (ref 1.8–2.4)
MCH RBC QN AUTO: 27.8 PG (ref 26–34)
MCHC RBC AUTO-ENTMCNC: 31.8 G/DL (ref 31–36)
MCV RBC AUTO: 87.4 FL (ref 80–100)
PCO2 BLDA: 49 MM HG (ref 35–45)
PCO2 BLDA: 52.6 MM HG (ref 35–45)
PERFORMED ON: ABNORMAL
PERFORMED ON: NORMAL
PH BLDA: 7.34 [PH] (ref 7.35–7.45)
PH BLDA: 7.35 [PH] (ref 7.35–7.45)
PLATELET # BLD AUTO: 180 K/UL (ref 135–450)
PMV BLD AUTO: 7.7 FL (ref 5–10.5)
PO2 BLDA: 56.6 MM HG (ref 75–108)
PO2 BLDA: 61.4 MM HG (ref 75–108)
POC SAMPLE TYPE: ABNORMAL
POC SAMPLE TYPE: ABNORMAL
POTASSIUM BLD-SCNC: 4.9 MMOL/L (ref 3.5–5.1)
POTASSIUM SERPL-SCNC: 4.5 MMOL/L (ref 3.5–5.1)
POTASSIUM SERPL-SCNC: 4.6 MMOL/L (ref 3.5–5.1)
POTASSIUM SERPL-SCNC: 4.8 MMOL/L (ref 3.5–5.1)
PROTHROMBIN TIME: 14.2 SEC (ref 11.5–14.8)
RBC # BLD AUTO: 4.09 M/UL (ref 4–5.2)
SAO2 % BLDA: 87 % (ref 93–100)
SAO2 % BLDA: 90 % (ref 93–100)
SODIUM BLD-SCNC: 144 MMOL/L (ref 136–145)
SODIUM SERPL-SCNC: 140 MMOL/L (ref 136–145)
WBC # BLD AUTO: 20.1 K/UL (ref 4–11)

## 2023-05-20 PROCEDURE — 6360000002 HC RX W HCPCS: Performed by: THORACIC SURGERY (CARDIOTHORACIC VASCULAR SURGERY)

## 2023-05-20 PROCEDURE — 2100000000 HC CCU R&B

## 2023-05-20 PROCEDURE — 6370000000 HC RX 637 (ALT 250 FOR IP): Performed by: NURSE PRACTITIONER

## 2023-05-20 PROCEDURE — 97110 THERAPEUTIC EXERCISES: CPT

## 2023-05-20 PROCEDURE — 84295 ASSAY OF SERUM SODIUM: CPT

## 2023-05-20 PROCEDURE — 99024 POSTOP FOLLOW-UP VISIT: CPT | Performed by: NURSE PRACTITIONER

## 2023-05-20 PROCEDURE — 97530 THERAPEUTIC ACTIVITIES: CPT

## 2023-05-20 PROCEDURE — 6360000002 HC RX W HCPCS: Performed by: NURSE PRACTITIONER

## 2023-05-20 PROCEDURE — 71045 X-RAY EXAM CHEST 1 VIEW: CPT

## 2023-05-20 PROCEDURE — 85610 PROTHROMBIN TIME: CPT

## 2023-05-20 PROCEDURE — 94660 CPAP INITIATION&MGMT: CPT

## 2023-05-20 PROCEDURE — 94669 MECHANICAL CHEST WALL OSCILL: CPT

## 2023-05-20 PROCEDURE — 83735 ASSAY OF MAGNESIUM: CPT

## 2023-05-20 PROCEDURE — 94761 N-INVAS EAR/PLS OXIMETRY MLT: CPT

## 2023-05-20 PROCEDURE — 2580000003 HC RX 258: Performed by: THORACIC SURGERY (CARDIOTHORACIC VASCULAR SURGERY)

## 2023-05-20 PROCEDURE — 85027 COMPLETE CBC AUTOMATED: CPT

## 2023-05-20 PROCEDURE — 82330 ASSAY OF CALCIUM: CPT

## 2023-05-20 PROCEDURE — 2580000003 HC RX 258: Performed by: NURSE PRACTITIONER

## 2023-05-20 PROCEDURE — 2700000000 HC OXYGEN THERAPY PER DAY

## 2023-05-20 PROCEDURE — 82947 ASSAY GLUCOSE BLOOD QUANT: CPT

## 2023-05-20 PROCEDURE — 97162 PT EVAL MOD COMPLEX 30 MIN: CPT

## 2023-05-20 PROCEDURE — 94640 AIRWAY INHALATION TREATMENT: CPT

## 2023-05-20 PROCEDURE — 82803 BLOOD GASES ANY COMBINATION: CPT

## 2023-05-20 PROCEDURE — 84132 ASSAY OF SERUM POTASSIUM: CPT

## 2023-05-20 PROCEDURE — 85014 HEMATOCRIT: CPT

## 2023-05-20 PROCEDURE — 6370000000 HC RX 637 (ALT 250 FOR IP): Performed by: THORACIC SURGERY (CARDIOTHORACIC VASCULAR SURGERY)

## 2023-05-20 PROCEDURE — 83605 ASSAY OF LACTIC ACID: CPT

## 2023-05-20 PROCEDURE — 80048 BASIC METABOLIC PNL TOTAL CA: CPT

## 2023-05-20 RX ORDER — AMIODARONE HYDROCHLORIDE 200 MG/1
400 TABLET ORAL 2 TIMES DAILY
Status: DISCONTINUED | OUTPATIENT
Start: 2023-05-20 | End: 2023-05-26 | Stop reason: HOSPADM

## 2023-05-20 RX ORDER — DIGOXIN 125 MCG
250 TABLET ORAL 2 TIMES DAILY
Status: COMPLETED | OUTPATIENT
Start: 2023-05-20 | End: 2023-05-21

## 2023-05-20 RX ORDER — GABAPENTIN 300 MG/1
300 CAPSULE ORAL 3 TIMES DAILY
Status: DISCONTINUED | OUTPATIENT
Start: 2023-05-20 | End: 2023-05-22

## 2023-05-20 RX ORDER — METHOCARBAMOL 750 MG/1
750 TABLET, FILM COATED ORAL 4 TIMES DAILY
Status: DISCONTINUED | OUTPATIENT
Start: 2023-05-20 | End: 2023-05-22

## 2023-05-20 RX ORDER — DOBUTAMINE HYDROCHLORIDE 200 MG/100ML
5 INJECTION INTRAVENOUS CONTINUOUS
Status: DISCONTINUED | OUTPATIENT
Start: 2023-05-20 | End: 2023-05-21

## 2023-05-20 RX ORDER — ACETAMINOPHEN 325 MG/1
650 TABLET ORAL EVERY 6 HOURS
Status: DISCONTINUED | OUTPATIENT
Start: 2023-05-20 | End: 2023-05-26 | Stop reason: HOSPADM

## 2023-05-20 RX ORDER — METOCLOPRAMIDE HYDROCHLORIDE 5 MG/ML
10 INJECTION INTRAMUSCULAR; INTRAVENOUS EVERY 6 HOURS
Status: COMPLETED | OUTPATIENT
Start: 2023-05-20 | End: 2023-05-21

## 2023-05-20 RX ADMIN — METOCLOPRAMIDE HYDROCHLORIDE 10 MG: 5 INJECTION INTRAMUSCULAR; INTRAVENOUS at 23:58

## 2023-05-20 RX ADMIN — FONDAPARINUX SODIUM 2.5 MG: 2.5 INJECTION, SOLUTION SUBCUTANEOUS at 09:09

## 2023-05-20 RX ADMIN — SODIUM CHLORIDE, PRESERVATIVE FREE 10 ML: 5 INJECTION INTRAVENOUS at 09:15

## 2023-05-20 RX ADMIN — DIGOXIN 250 MCG: 125 TABLET ORAL at 21:20

## 2023-05-20 RX ADMIN — METHOCARBAMOL 750 MG: 750 TABLET ORAL at 17:22

## 2023-05-20 RX ADMIN — POLYETHYLENE GLYCOL 3350 17 G: 17 POWDER, FOR SOLUTION ORAL at 09:05

## 2023-05-20 RX ADMIN — OXYCODONE 5 MG: 5 TABLET ORAL at 09:06

## 2023-05-20 RX ADMIN — DOBUTAMINE HYDROCHLORIDE 5 MCG/KG/MIN: 200 INJECTION INTRAVENOUS at 10:17

## 2023-05-20 RX ADMIN — ALBUTEROL SULFATE 2.5 MG: 2.5 SOLUTION RESPIRATORY (INHALATION) at 08:17

## 2023-05-20 RX ADMIN — FUROSEMIDE 40 MG: 10 INJECTION, SOLUTION INTRAMUSCULAR; INTRAVENOUS at 09:08

## 2023-05-20 RX ADMIN — VANCOMYCIN HYDROCHLORIDE 1000 MG: 1 INJECTION, POWDER, LYOPHILIZED, FOR SOLUTION INTRAVENOUS at 09:13

## 2023-05-20 RX ADMIN — BISACODYL 5 MG: 5 TABLET, COATED ORAL at 09:08

## 2023-05-20 RX ADMIN — GABAPENTIN 300 MG: 300 CAPSULE ORAL at 13:52

## 2023-05-20 RX ADMIN — METOPROLOL TARTRATE 12.5 MG: 25 TABLET, FILM COATED ORAL at 09:06

## 2023-05-20 RX ADMIN — MAGNESIUM OXIDE TAB 400 MG (240 MG ELEMENTAL MG) 400 MG: 400 (240 MG) TAB at 21:20

## 2023-05-20 RX ADMIN — METHOCARBAMOL 750 MG: 750 TABLET ORAL at 21:20

## 2023-05-20 RX ADMIN — MORPHINE SULFATE 2 MG: 2 INJECTION, SOLUTION INTRAMUSCULAR; INTRAVENOUS at 21:39

## 2023-05-20 RX ADMIN — ALBUTEROL SULFATE 2.5 MG: 2.5 SOLUTION RESPIRATORY (INHALATION) at 16:13

## 2023-05-20 RX ADMIN — FUROSEMIDE 5 MG/HR: 10 INJECTION, SOLUTION INTRAMUSCULAR; INTRAVENOUS at 11:13

## 2023-05-20 RX ADMIN — ALBUTEROL SULFATE 2.5 MG: 2.5 SOLUTION RESPIRATORY (INHALATION) at 19:40

## 2023-05-20 RX ADMIN — FAMOTIDINE 20 MG: 20 TABLET, FILM COATED ORAL at 21:20

## 2023-05-20 RX ADMIN — ACETAMINOPHEN 325MG 650 MG: 325 TABLET ORAL at 11:05

## 2023-05-20 RX ADMIN — CHLORHEXIDINE GLUCONATE 15 ML: 1.2 RINSE ORAL at 09:15

## 2023-05-20 RX ADMIN — ACETAMINOPHEN 325MG 650 MG: 325 TABLET ORAL at 23:58

## 2023-05-20 RX ADMIN — Medication 2000 MG: at 10:19

## 2023-05-20 RX ADMIN — VANCOMYCIN HYDROCHLORIDE 1000 MG: 1 INJECTION, POWDER, LYOPHILIZED, FOR SOLUTION INTRAVENOUS at 20:18

## 2023-05-20 RX ADMIN — GABAPENTIN 300 MG: 300 CAPSULE ORAL at 21:20

## 2023-05-20 RX ADMIN — MORPHINE SULFATE 4 MG: 4 INJECTION, SOLUTION INTRAMUSCULAR; INTRAVENOUS at 03:27

## 2023-05-20 RX ADMIN — ASPIRIN 81 MG: 81 TABLET, COATED ORAL at 09:07

## 2023-05-20 RX ADMIN — MAGNESIUM OXIDE TAB 400 MG (240 MG ELEMENTAL MG) 400 MG: 400 (240 MG) TAB at 09:06

## 2023-05-20 RX ADMIN — SODIUM CHLORIDE, PRESERVATIVE FREE 10 ML: 5 INJECTION INTRAVENOUS at 21:21

## 2023-05-20 RX ADMIN — METOCLOPRAMIDE HYDROCHLORIDE 10 MG: 5 INJECTION INTRAMUSCULAR; INTRAVENOUS at 17:30

## 2023-05-20 RX ADMIN — AMIODARONE HYDROCHLORIDE 400 MG: 200 TABLET ORAL at 21:20

## 2023-05-20 RX ADMIN — METOCLOPRAMIDE HYDROCHLORIDE 10 MG: 5 INJECTION INTRAMUSCULAR; INTRAVENOUS at 13:52

## 2023-05-20 RX ADMIN — FAMOTIDINE 20 MG: 20 TABLET, FILM COATED ORAL at 09:07

## 2023-05-20 RX ADMIN — Medication 2000 MG: at 17:38

## 2023-05-20 RX ADMIN — SODIUM CHLORIDE: 9 INJECTION, SOLUTION INTRAVENOUS at 23:56

## 2023-05-20 RX ADMIN — ATORVASTATIN CALCIUM 40 MG: 40 TABLET, FILM COATED ORAL at 21:20

## 2023-05-20 RX ADMIN — CHLORHEXIDINE GLUCONATE 15 ML: 1.2 RINSE ORAL at 21:20

## 2023-05-20 RX ADMIN — CLOPIDOGREL BISULFATE 75 MG: 75 TABLET ORAL at 09:07

## 2023-05-20 RX ADMIN — ACETAMINOPHEN 325MG 650 MG: 325 TABLET ORAL at 17:25

## 2023-05-20 RX ADMIN — DIGOXIN 250 MCG: 125 TABLET ORAL at 13:52

## 2023-05-20 RX ADMIN — Medication 2000 MG: at 00:03

## 2023-05-20 RX ADMIN — POTASSIUM CHLORIDE 10 MEQ: 750 TABLET, EXTENDED RELEASE ORAL at 09:07

## 2023-05-20 RX ADMIN — AMIODARONE HYDROCHLORIDE 400 MG: 200 TABLET ORAL at 11:05

## 2023-05-20 RX ADMIN — SODIUM CHLORIDE 0.45 UNITS/HR: 9 INJECTION, SOLUTION INTRAVENOUS at 21:32

## 2023-05-20 RX ADMIN — ONDANSETRON 4 MG: 2 INJECTION INTRAMUSCULAR; INTRAVENOUS at 09:05

## 2023-05-20 RX ADMIN — Medication 2000 MG: at 23:58

## 2023-05-20 RX ADMIN — METHOCARBAMOL 750 MG: 750 TABLET ORAL at 11:05

## 2023-05-20 ASSESSMENT — PAIN DESCRIPTION - LOCATION
LOCATION: CHEST
LOCATION: CHEST
LOCATION: CHEST;BACK

## 2023-05-20 ASSESSMENT — PAIN SCALES - GENERAL
PAINLEVEL_OUTOF10: 0
PAINLEVEL_OUTOF10: 3
PAINLEVEL_OUTOF10: 3
PAINLEVEL_OUTOF10: 0
PAINLEVEL_OUTOF10: 3
PAINLEVEL_OUTOF10: 9
PAINLEVEL_OUTOF10: 8
PAINLEVEL_OUTOF10: 0
PAINLEVEL_OUTOF10: 3

## 2023-05-20 ASSESSMENT — PAIN DESCRIPTION - ONSET: ONSET: ON-GOING

## 2023-05-20 ASSESSMENT — PAIN DESCRIPTION - DESCRIPTORS
DESCRIPTORS: ACHING
DESCRIPTORS: ACHING;SHARP

## 2023-05-20 ASSESSMENT — PAIN DESCRIPTION - ORIENTATION
ORIENTATION: MID
ORIENTATION: RIGHT

## 2023-05-20 ASSESSMENT — PAIN SCALES - WONG BAKER
WONGBAKER_NUMERICALRESPONSE: 0

## 2023-05-20 ASSESSMENT — PAIN - FUNCTIONAL ASSESSMENT: PAIN_FUNCTIONAL_ASSESSMENT: ACTIVITIES ARE NOT PREVENTED

## 2023-05-20 ASSESSMENT — PAIN DESCRIPTION - FREQUENCY: FREQUENCY: CONTINUOUS

## 2023-05-20 ASSESSMENT — PAIN DESCRIPTION - PAIN TYPE: TYPE: SURGICAL PAIN

## 2023-05-21 ENCOUNTER — APPOINTMENT (OUTPATIENT)
Dept: GENERAL RADIOLOGY | Age: 68
DRG: 233 | End: 2023-05-21
Attending: INTERNAL MEDICINE
Payer: MEDICARE

## 2023-05-21 LAB
ANION GAP SERPL CALCULATED.3IONS-SCNC: 12 MMOL/L (ref 3–16)
BASE EXCESS BLDA CALC-SCNC: 5 MMOL/L (ref -3–3)
BASE EXCESS BLDA CALC-SCNC: 5 MMOL/L (ref -3–3)
BASE EXCESS BLDA CALC-SCNC: 6 MMOL/L (ref -3–3)
BASE EXCESS BLDA CALC-SCNC: 7 MMOL/L (ref -3–3)
BUN SERPL-MCNC: 15 MG/DL (ref 7–20)
CA-I BLD-SCNC: 1.05 MMOL/L (ref 1.12–1.32)
CALCIUM SERPL-MCNC: 8.3 MG/DL (ref 8.3–10.6)
CHLORIDE SERPL-SCNC: 97 MMOL/L (ref 99–110)
CO2 BLDA-SCNC: 31 MMOL/L
CO2 BLDA-SCNC: 32 MMOL/L
CO2 BLDA-SCNC: 33 MMOL/L
CO2 BLDA-SCNC: 34 MMOL/L
CO2 SERPL-SCNC: 27 MMOL/L (ref 21–32)
CREAT SERPL-MCNC: 0.8 MG/DL (ref 0.6–1.2)
DEPRECATED RDW RBC AUTO: 15.4 % (ref 12.4–15.4)
GFR SERPLBLD CREATININE-BSD FMLA CKD-EPI: >60 ML/MIN/{1.73_M2}
GLUCOSE BLD-MCNC: 104 MG/DL (ref 70–99)
GLUCOSE BLD-MCNC: 106 MG/DL (ref 70–99)
GLUCOSE BLD-MCNC: 109 MG/DL (ref 70–99)
GLUCOSE BLD-MCNC: 110 MG/DL (ref 70–99)
GLUCOSE BLD-MCNC: 123 MG/DL (ref 70–99)
GLUCOSE BLD-MCNC: 131 MG/DL (ref 70–99)
GLUCOSE BLD-MCNC: 133 MG/DL (ref 70–99)
GLUCOSE BLD-MCNC: 135 MG/DL (ref 70–99)
GLUCOSE BLD-MCNC: 137 MG/DL (ref 70–99)
GLUCOSE BLD-MCNC: 155 MG/DL (ref 70–99)
GLUCOSE SERPL-MCNC: 130 MG/DL (ref 70–99)
HCO3 BLDA-SCNC: 29.7 MMOL/L (ref 21–29)
HCO3 BLDA-SCNC: 30.4 MMOL/L (ref 21–29)
HCO3 BLDA-SCNC: 30.9 MMOL/L (ref 21–29)
HCO3 BLDA-SCNC: 32.1 MMOL/L (ref 21–29)
HCT VFR BLD AUTO: 33.9 % (ref 36–48)
HGB BLD-MCNC: 11 G/DL (ref 12–16)
INR PPP: 1.21 (ref 0.84–1.16)
MAGNESIUM SERPL-MCNC: 1.8 MG/DL (ref 1.8–2.4)
MAGNESIUM SERPL-MCNC: 1.9 MG/DL (ref 1.8–2.4)
MAGNESIUM SERPL-MCNC: 2 MG/DL (ref 1.8–2.4)
MCH RBC QN AUTO: 28.3 PG (ref 26–34)
MCHC RBC AUTO-ENTMCNC: 32.3 G/DL (ref 31–36)
MCV RBC AUTO: 87.5 FL (ref 80–100)
PCO2 BLDA: 46.6 MM HG (ref 35–45)
PCO2 BLDA: 47.5 MM HG (ref 35–45)
PCO2 BLDA: 53.2 MM HG (ref 35–45)
PCO2 BLDA: 56.2 MM HG (ref 35–45)
PERFORMED ON: ABNORMAL
PH BLDA: 7.35 [PH] (ref 7.35–7.45)
PH BLDA: 7.39 [PH] (ref 7.35–7.45)
PH BLDA: 7.41 [PH] (ref 7.35–7.45)
PH BLDA: 7.41 [PH] (ref 7.35–7.45)
PH BLDV: 7.4 [PH] (ref 7.35–7.45)
PLATELET # BLD AUTO: 149 K/UL (ref 135–450)
PMV BLD AUTO: 7.9 FL (ref 5–10.5)
PO2 BLDA: 59.6 MM HG (ref 75–108)
PO2 BLDA: 60.9 MM HG (ref 75–108)
PO2 BLDA: 70.7 MM HG (ref 75–108)
PO2 BLDA: 71.5 MM HG (ref 75–108)
POC SAMPLE TYPE: ABNORMAL
POTASSIUM SERPL-SCNC: 3.8 MMOL/L (ref 3.5–5.1)
POTASSIUM SERPL-SCNC: 4 MMOL/L (ref 3.5–5.1)
POTASSIUM SERPL-SCNC: 4.2 MMOL/L (ref 3.5–5.1)
PROTHROMBIN TIME: 15.3 SEC (ref 11.5–14.8)
RBC # BLD AUTO: 3.88 M/UL (ref 4–5.2)
SAO2 % BLDA: 90 % (ref 93–100)
SAO2 % BLDA: 91 % (ref 93–100)
SAO2 % BLDA: 93 % (ref 93–100)
SAO2 % BLDA: 93 % (ref 93–100)
SODIUM SERPL-SCNC: 136 MMOL/L (ref 136–145)
WBC # BLD AUTO: 16.5 K/UL (ref 4–11)

## 2023-05-21 PROCEDURE — 6360000002 HC RX W HCPCS: Performed by: THORACIC SURGERY (CARDIOTHORACIC VASCULAR SURGERY)

## 2023-05-21 PROCEDURE — 2580000003 HC RX 258: Performed by: THORACIC SURGERY (CARDIOTHORACIC VASCULAR SURGERY)

## 2023-05-21 PROCEDURE — 6370000000 HC RX 637 (ALT 250 FOR IP): Performed by: NURSE PRACTITIONER

## 2023-05-21 PROCEDURE — 94640 AIRWAY INHALATION TREATMENT: CPT

## 2023-05-21 PROCEDURE — 94761 N-INVAS EAR/PLS OXIMETRY MLT: CPT

## 2023-05-21 PROCEDURE — 85027 COMPLETE CBC AUTOMATED: CPT

## 2023-05-21 PROCEDURE — 80048 BASIC METABOLIC PNL TOTAL CA: CPT

## 2023-05-21 PROCEDURE — 99024 POSTOP FOLLOW-UP VISIT: CPT | Performed by: NURSE PRACTITIONER

## 2023-05-21 PROCEDURE — 82803 BLOOD GASES ANY COMBINATION: CPT

## 2023-05-21 PROCEDURE — 2100000000 HC CCU R&B

## 2023-05-21 PROCEDURE — 94669 MECHANICAL CHEST WALL OSCILL: CPT

## 2023-05-21 PROCEDURE — 82330 ASSAY OF CALCIUM: CPT

## 2023-05-21 PROCEDURE — 94660 CPAP INITIATION&MGMT: CPT

## 2023-05-21 PROCEDURE — 2580000003 HC RX 258: Performed by: NURSE PRACTITIONER

## 2023-05-21 PROCEDURE — 84132 ASSAY OF SERUM POTASSIUM: CPT

## 2023-05-21 PROCEDURE — 6370000000 HC RX 637 (ALT 250 FOR IP): Performed by: THORACIC SURGERY (CARDIOTHORACIC VASCULAR SURGERY)

## 2023-05-21 PROCEDURE — 71045 X-RAY EXAM CHEST 1 VIEW: CPT

## 2023-05-21 PROCEDURE — 2700000000 HC OXYGEN THERAPY PER DAY

## 2023-05-21 PROCEDURE — 2500000003 HC RX 250 WO HCPCS: Performed by: THORACIC SURGERY (CARDIOTHORACIC VASCULAR SURGERY)

## 2023-05-21 PROCEDURE — 85610 PROTHROMBIN TIME: CPT

## 2023-05-21 PROCEDURE — 82947 ASSAY GLUCOSE BLOOD QUANT: CPT

## 2023-05-21 PROCEDURE — 83735 ASSAY OF MAGNESIUM: CPT

## 2023-05-21 PROCEDURE — 6360000002 HC RX W HCPCS: Performed by: NURSE PRACTITIONER

## 2023-05-21 RX ORDER — DOBUTAMINE HYDROCHLORIDE 200 MG/100ML
3 INJECTION INTRAVENOUS CONTINUOUS
Status: DISCONTINUED | OUTPATIENT
Start: 2023-05-21 | End: 2023-05-23

## 2023-05-21 RX ADMIN — FUROSEMIDE 20 MG/HR: 10 INJECTION, SOLUTION INTRAMUSCULAR; INTRAVENOUS at 06:59

## 2023-05-21 RX ADMIN — FONDAPARINUX SODIUM 2.5 MG: 2.5 INJECTION, SOLUTION SUBCUTANEOUS at 09:59

## 2023-05-21 RX ADMIN — FAMOTIDINE 20 MG: 20 TABLET, FILM COATED ORAL at 09:58

## 2023-05-21 RX ADMIN — ACETAMINOPHEN 325MG 650 MG: 325 TABLET ORAL at 22:49

## 2023-05-21 RX ADMIN — GABAPENTIN 300 MG: 300 CAPSULE ORAL at 20:17

## 2023-05-21 RX ADMIN — MAGNESIUM OXIDE TAB 400 MG (240 MG ELEMENTAL MG) 400 MG: 400 (240 MG) TAB at 20:17

## 2023-05-21 RX ADMIN — METHOCARBAMOL 750 MG: 750 TABLET ORAL at 20:17

## 2023-05-21 RX ADMIN — MAGNESIUM OXIDE TAB 400 MG (240 MG ELEMENTAL MG) 400 MG: 400 (240 MG) TAB at 09:58

## 2023-05-21 RX ADMIN — POLYETHYLENE GLYCOL 3350 17 G: 17 POWDER, FOR SOLUTION ORAL at 09:00

## 2023-05-21 RX ADMIN — POTASSIUM CHLORIDE 10 MEQ: 750 TABLET, EXTENDED RELEASE ORAL at 09:57

## 2023-05-21 RX ADMIN — ALBUTEROL SULFATE 2.5 MG: 2.5 SOLUTION RESPIRATORY (INHALATION) at 15:41

## 2023-05-21 RX ADMIN — POTASSIUM CHLORIDE 10 MEQ: 750 TABLET, EXTENDED RELEASE ORAL at 12:50

## 2023-05-21 RX ADMIN — CALCIUM CHLORIDE INJECTION 1000 MG: 100 INJECTION, SOLUTION INTRAVENOUS at 18:48

## 2023-05-21 RX ADMIN — MORPHINE SULFATE 4 MG: 4 INJECTION, SOLUTION INTRAMUSCULAR; INTRAVENOUS at 03:29

## 2023-05-21 RX ADMIN — POTASSIUM CHLORIDE 10 MEQ: 750 TABLET, EXTENDED RELEASE ORAL at 18:11

## 2023-05-21 RX ADMIN — ALBUTEROL SULFATE 2.5 MG: 2.5 SOLUTION RESPIRATORY (INHALATION) at 11:53

## 2023-05-21 RX ADMIN — GABAPENTIN 300 MG: 300 CAPSULE ORAL at 09:58

## 2023-05-21 RX ADMIN — SODIUM CHLORIDE, PRESERVATIVE FREE 5 ML: 5 INJECTION INTRAVENOUS at 21:11

## 2023-05-21 RX ADMIN — SODIUM CHLORIDE, PRESERVATIVE FREE 10 ML: 5 INJECTION INTRAVENOUS at 09:00

## 2023-05-21 RX ADMIN — POTASSIUM CHLORIDE 20 MEQ: 29.8 INJECTION, SOLUTION INTRAVENOUS at 08:00

## 2023-05-21 RX ADMIN — FAMOTIDINE 20 MG: 20 TABLET, FILM COATED ORAL at 20:17

## 2023-05-21 RX ADMIN — FUROSEMIDE 20 MG/HR: 10 INJECTION, SOLUTION INTRAMUSCULAR; INTRAVENOUS at 13:33

## 2023-05-21 RX ADMIN — INSULIN GLARGINE 11 UNITS: 100 INJECTION, SOLUTION SUBCUTANEOUS at 20:20

## 2023-05-21 RX ADMIN — DOBUTAMINE HYDROCHLORIDE 5 MCG/KG/MIN: 200 INJECTION INTRAVENOUS at 07:35

## 2023-05-21 RX ADMIN — ATORVASTATIN CALCIUM 40 MG: 40 TABLET, FILM COATED ORAL at 20:17

## 2023-05-21 RX ADMIN — AMIODARONE HYDROCHLORIDE 400 MG: 200 TABLET ORAL at 09:58

## 2023-05-21 RX ADMIN — ASPIRIN 81 MG: 81 TABLET, COATED ORAL at 09:58

## 2023-05-21 RX ADMIN — DIGOXIN 250 MCG: 125 TABLET ORAL at 20:17

## 2023-05-21 RX ADMIN — ALBUTEROL SULFATE 2.5 MG: 2.5 SOLUTION RESPIRATORY (INHALATION) at 20:18

## 2023-05-21 RX ADMIN — CHLORHEXIDINE GLUCONATE 15 ML: 1.2 RINSE ORAL at 09:00

## 2023-05-21 RX ADMIN — POTASSIUM CHLORIDE 20 MEQ: 29.8 INJECTION, SOLUTION INTRAVENOUS at 20:27

## 2023-05-21 RX ADMIN — ALBUTEROL SULFATE 2.5 MG: 2.5 SOLUTION RESPIRATORY (INHALATION) at 07:55

## 2023-05-21 RX ADMIN — AMIODARONE HYDROCHLORIDE 400 MG: 200 TABLET ORAL at 20:16

## 2023-05-21 RX ADMIN — POTASSIUM CHLORIDE 20 MEQ: 29.8 INJECTION, SOLUTION INTRAVENOUS at 21:57

## 2023-05-21 RX ADMIN — AMIODARONE HYDROCHLORIDE 300 MG: 50 INJECTION, SOLUTION INTRAVENOUS at 10:05

## 2023-05-21 RX ADMIN — BISACODYL 5 MG: 5 TABLET, COATED ORAL at 09:00

## 2023-05-21 RX ADMIN — ACETAMINOPHEN 325MG 650 MG: 325 TABLET ORAL at 18:11

## 2023-05-21 RX ADMIN — METHOCARBAMOL 750 MG: 750 TABLET ORAL at 09:57

## 2023-05-21 RX ADMIN — CLOPIDOGREL BISULFATE 75 MG: 75 TABLET ORAL at 09:57

## 2023-05-21 RX ADMIN — METHOCARBAMOL 750 MG: 750 TABLET ORAL at 18:11

## 2023-05-21 RX ADMIN — DOBUTAMINE HYDROCHLORIDE 3 MCG/KG/MIN: 200 INJECTION INTRAVENOUS at 12:52

## 2023-05-21 RX ADMIN — METOCLOPRAMIDE HYDROCHLORIDE 10 MG: 5 INJECTION INTRAMUSCULAR; INTRAVENOUS at 07:11

## 2023-05-21 RX ADMIN — DIGOXIN 250 MCG: 125 TABLET ORAL at 09:57

## 2023-05-21 RX ADMIN — ACETAMINOPHEN 325MG 650 MG: 325 TABLET ORAL at 12:49

## 2023-05-21 RX ADMIN — METHOCARBAMOL 750 MG: 750 TABLET ORAL at 12:54

## 2023-05-21 RX ADMIN — SODIUM CHLORIDE 0.46 UNITS/HR: 9 INJECTION, SOLUTION INTRAVENOUS at 02:04

## 2023-05-21 RX ADMIN — FUROSEMIDE 20 MG/HR: 10 INJECTION, SOLUTION INTRAMUSCULAR; INTRAVENOUS at 18:46

## 2023-05-21 RX ADMIN — GABAPENTIN 300 MG: 300 CAPSULE ORAL at 15:19

## 2023-05-21 ASSESSMENT — PAIN SCALES - GENERAL
PAINLEVEL_OUTOF10: 4
PAINLEVEL_OUTOF10: 7

## 2023-05-22 ENCOUNTER — APPOINTMENT (OUTPATIENT)
Dept: GENERAL RADIOLOGY | Age: 68
DRG: 233 | End: 2023-05-22
Attending: INTERNAL MEDICINE
Payer: MEDICARE

## 2023-05-22 LAB
ANION GAP SERPL CALCULATED.3IONS-SCNC: 7 MMOL/L (ref 3–16)
BASE EXCESS BLDA CALC-SCNC: 13 MMOL/L (ref -3–3)
BLOOD BANK DISPENSE STATUS: NORMAL
BLOOD BANK DISPENSE STATUS: NORMAL
BLOOD BANK PRODUCT CODE: NORMAL
BLOOD BANK PRODUCT CODE: NORMAL
BPU ID: NORMAL
BPU ID: NORMAL
BUN SERPL-MCNC: 18 MG/DL (ref 7–20)
CALCIUM SERPL-MCNC: 8.9 MG/DL (ref 8.3–10.6)
CHLORIDE SERPL-SCNC: 94 MMOL/L (ref 99–110)
CO2 BLDA-SCNC: 39 MMOL/L
CO2 SERPL-SCNC: 33 MMOL/L (ref 21–32)
CREAT SERPL-MCNC: 0.7 MG/DL (ref 0.6–1.2)
DEPRECATED RDW RBC AUTO: 15.3 % (ref 12.4–15.4)
DESCRIPTION BLOOD BANK: NORMAL
DESCRIPTION BLOOD BANK: NORMAL
DIGOXIN SERPL-MCNC: 1.9 NG/ML (ref 0.8–2)
GFR SERPLBLD CREATININE-BSD FMLA CKD-EPI: >60 ML/MIN/{1.73_M2}
GLUCOSE BLD-MCNC: 109 MG/DL (ref 70–99)
GLUCOSE BLD-MCNC: 113 MG/DL (ref 70–99)
GLUCOSE BLD-MCNC: 144 MG/DL (ref 70–99)
GLUCOSE BLD-MCNC: 147 MG/DL (ref 70–99)
GLUCOSE BLD-MCNC: 72 MG/DL (ref 70–99)
GLUCOSE SERPL-MCNC: 115 MG/DL (ref 70–99)
HCO3 BLDA-SCNC: 37.5 MMOL/L (ref 21–29)
HCT VFR BLD AUTO: 31.5 % (ref 36–48)
HGB BLD-MCNC: 10.1 G/DL (ref 12–16)
MAGNESIUM SERPL-MCNC: 1.9 MG/DL (ref 1.8–2.4)
MAGNESIUM SERPL-MCNC: 2.2 MG/DL (ref 1.8–2.4)
MAGNESIUM SERPL-MCNC: 2.2 MG/DL (ref 1.8–2.4)
MAGNESIUM SERPL-MCNC: 2.5 MG/DL (ref 1.8–2.4)
MAGNESIUM SERPL-MCNC: 2.5 MG/DL (ref 1.8–2.4)
MCH RBC QN AUTO: 28 PG (ref 26–34)
MCHC RBC AUTO-ENTMCNC: 32.2 G/DL (ref 31–36)
MCV RBC AUTO: 86.9 FL (ref 80–100)
PCO2 BLDA: 55.5 MM HG (ref 35–45)
PERFORMED ON: ABNORMAL
PERFORMED ON: NORMAL
PH BLDA: 7.44 [PH] (ref 7.35–7.45)
PLATELET # BLD AUTO: 160 K/UL (ref 135–450)
PMV BLD AUTO: 8.3 FL (ref 5–10.5)
PO2 BLDA: 69.8 MM HG (ref 75–108)
POC SAMPLE TYPE: ABNORMAL
POTASSIUM SERPL-SCNC: 3.8 MMOL/L (ref 3.5–5.1)
POTASSIUM SERPL-SCNC: 3.8 MMOL/L (ref 3.5–5.1)
POTASSIUM SERPL-SCNC: 3.9 MMOL/L (ref 3.5–5.1)
POTASSIUM SERPL-SCNC: 4 MMOL/L (ref 3.5–5.1)
POTASSIUM SERPL-SCNC: 4.8 MMOL/L (ref 3.5–5.1)
RBC # BLD AUTO: 3.63 M/UL (ref 4–5.2)
SAO2 % BLDA: 94 % (ref 93–100)
SODIUM SERPL-SCNC: 134 MMOL/L (ref 136–145)
WBC # BLD AUTO: 16 K/UL (ref 4–11)

## 2023-05-22 PROCEDURE — P9047 ALBUMIN (HUMAN), 25%, 50ML: HCPCS | Performed by: NURSE PRACTITIONER

## 2023-05-22 PROCEDURE — 85027 COMPLETE CBC AUTOMATED: CPT

## 2023-05-22 PROCEDURE — 2580000003 HC RX 258

## 2023-05-22 PROCEDURE — 2580000003 HC RX 258: Performed by: THORACIC SURGERY (CARDIOTHORACIC VASCULAR SURGERY)

## 2023-05-22 PROCEDURE — 6370000000 HC RX 637 (ALT 250 FOR IP): Performed by: NURSE PRACTITIONER

## 2023-05-22 PROCEDURE — 94660 CPAP INITIATION&MGMT: CPT

## 2023-05-22 PROCEDURE — 94761 N-INVAS EAR/PLS OXIMETRY MLT: CPT

## 2023-05-22 PROCEDURE — 6360000002 HC RX W HCPCS: Performed by: THORACIC SURGERY (CARDIOTHORACIC VASCULAR SURGERY)

## 2023-05-22 PROCEDURE — 6360000002 HC RX W HCPCS

## 2023-05-22 PROCEDURE — 83735 ASSAY OF MAGNESIUM: CPT

## 2023-05-22 PROCEDURE — 80162 ASSAY OF DIGOXIN TOTAL: CPT

## 2023-05-22 PROCEDURE — 99231 SBSQ HOSP IP/OBS SF/LOW 25: CPT | Performed by: SURGERY

## 2023-05-22 PROCEDURE — 97167 OT EVAL HIGH COMPLEX 60 MIN: CPT

## 2023-05-22 PROCEDURE — 94669 MECHANICAL CHEST WALL OSCILL: CPT

## 2023-05-22 PROCEDURE — 2140000000 HC CCU INTERMEDIATE R&B

## 2023-05-22 PROCEDURE — 84132 ASSAY OF SERUM POTASSIUM: CPT

## 2023-05-22 PROCEDURE — 82803 BLOOD GASES ANY COMBINATION: CPT

## 2023-05-22 PROCEDURE — 80048 BASIC METABOLIC PNL TOTAL CA: CPT

## 2023-05-22 PROCEDURE — 97535 SELF CARE MNGMENT TRAINING: CPT

## 2023-05-22 PROCEDURE — 71045 X-RAY EXAM CHEST 1 VIEW: CPT

## 2023-05-22 PROCEDURE — P9047 ALBUMIN (HUMAN), 25%, 50ML: HCPCS

## 2023-05-22 PROCEDURE — 6360000002 HC RX W HCPCS: Performed by: NURSE PRACTITIONER

## 2023-05-22 PROCEDURE — 6370000000 HC RX 637 (ALT 250 FOR IP)

## 2023-05-22 PROCEDURE — 94640 AIRWAY INHALATION TREATMENT: CPT

## 2023-05-22 PROCEDURE — 97530 THERAPEUTIC ACTIVITIES: CPT

## 2023-05-22 PROCEDURE — 2700000000 HC OXYGEN THERAPY PER DAY

## 2023-05-22 PROCEDURE — 97110 THERAPEUTIC EXERCISES: CPT

## 2023-05-22 PROCEDURE — 2100000000 HC CCU R&B

## 2023-05-22 PROCEDURE — 6370000000 HC RX 637 (ALT 250 FOR IP): Performed by: THORACIC SURGERY (CARDIOTHORACIC VASCULAR SURGERY)

## 2023-05-22 RX ORDER — ALBUMIN (HUMAN) 12.5 G/50ML
25 SOLUTION INTRAVENOUS ONCE
Status: COMPLETED | OUTPATIENT
Start: 2023-05-22 | End: 2023-05-22

## 2023-05-22 RX ORDER — POLYETHYLENE GLYCOL 3350 17 G/17G
17 POWDER, FOR SOLUTION ORAL DAILY PRN
Status: DISCONTINUED | OUTPATIENT
Start: 2023-05-22 | End: 2023-05-26 | Stop reason: HOSPADM

## 2023-05-22 RX ORDER — PRAMIPEXOLE DIHYDROCHLORIDE 0.25 MG/1
0.25 TABLET ORAL NIGHTLY
Status: DISCONTINUED | OUTPATIENT
Start: 2023-05-22 | End: 2023-05-26 | Stop reason: HOSPADM

## 2023-05-22 RX ORDER — BISACODYL 5 MG/1
5 TABLET, DELAYED RELEASE ORAL DAILY PRN
Status: DISCONTINUED | OUTPATIENT
Start: 2023-05-22 | End: 2023-05-26 | Stop reason: HOSPADM

## 2023-05-22 RX ORDER — DIGOXIN 125 MCG
125 TABLET ORAL DAILY
Status: DISCONTINUED | OUTPATIENT
Start: 2023-05-22 | End: 2023-05-26 | Stop reason: HOSPADM

## 2023-05-22 RX ADMIN — FAMOTIDINE 20 MG: 20 TABLET, FILM COATED ORAL at 08:40

## 2023-05-22 RX ADMIN — FAMOTIDINE 20 MG: 20 TABLET, FILM COATED ORAL at 20:19

## 2023-05-22 RX ADMIN — SODIUM CHLORIDE, PRESERVATIVE FREE 10 ML: 5 INJECTION INTRAVENOUS at 20:20

## 2023-05-22 RX ADMIN — ACETAMINOPHEN 325MG 650 MG: 325 TABLET ORAL at 11:18

## 2023-05-22 RX ADMIN — POTASSIUM CHLORIDE 10 MEQ: 750 TABLET, EXTENDED RELEASE ORAL at 16:55

## 2023-05-22 RX ADMIN — POTASSIUM CHLORIDE 20 MEQ: 29.8 INJECTION, SOLUTION INTRAVENOUS at 02:50

## 2023-05-22 RX ADMIN — BISACODYL 5 MG: 5 TABLET, COATED ORAL at 08:40

## 2023-05-22 RX ADMIN — SODIUM CHLORIDE, PRESERVATIVE FREE 10 ML: 5 INJECTION INTRAVENOUS at 08:44

## 2023-05-22 RX ADMIN — POTASSIUM CHLORIDE 20 MEQ: 29.8 INJECTION, SOLUTION INTRAVENOUS at 07:21

## 2023-05-22 RX ADMIN — ALBUTEROL SULFATE 2.5 MG: 2.5 SOLUTION RESPIRATORY (INHALATION) at 15:31

## 2023-05-22 RX ADMIN — POTASSIUM CHLORIDE 20 MEQ: 29.8 INJECTION, SOLUTION INTRAVENOUS at 13:59

## 2023-05-22 RX ADMIN — ALBUTEROL SULFATE 2.5 MG: 2.5 SOLUTION RESPIRATORY (INHALATION) at 11:23

## 2023-05-22 RX ADMIN — FUROSEMIDE 10 MG/HR: 10 INJECTION, SOLUTION INTRAMUSCULAR; INTRAVENOUS at 11:57

## 2023-05-22 RX ADMIN — AMIODARONE HYDROCHLORIDE 400 MG: 200 TABLET ORAL at 08:40

## 2023-05-22 RX ADMIN — POTASSIUM CHLORIDE 10 MEQ: 750 TABLET, EXTENDED RELEASE ORAL at 11:55

## 2023-05-22 RX ADMIN — ALBUTEROL SULFATE 2.5 MG: 2.5 SOLUTION RESPIRATORY (INHALATION) at 20:26

## 2023-05-22 RX ADMIN — DIGOXIN 125 MCG: 125 TABLET ORAL at 08:46

## 2023-05-22 RX ADMIN — OXYCODONE 5 MG: 5 TABLET ORAL at 20:19

## 2023-05-22 RX ADMIN — INSULIN LISPRO 2 UNITS: 100 INJECTION, SOLUTION INTRAVENOUS; SUBCUTANEOUS at 12:08

## 2023-05-22 RX ADMIN — INSULIN LISPRO 1 UNITS: 100 INJECTION, SOLUTION INTRAVENOUS; SUBCUTANEOUS at 20:22

## 2023-05-22 RX ADMIN — ALBUTEROL SULFATE 2.5 MG: 2.5 SOLUTION RESPIRATORY (INHALATION) at 07:18

## 2023-05-22 RX ADMIN — ALBUMIN (HUMAN) 25 G: 0.25 INJECTION, SOLUTION INTRAVENOUS at 13:57

## 2023-05-22 RX ADMIN — HYDRALAZINE HYDROCHLORIDE 5 MG: 20 INJECTION INTRAMUSCULAR; INTRAVENOUS at 17:12

## 2023-05-22 RX ADMIN — FONDAPARINUX SODIUM 2.5 MG: 2.5 INJECTION, SOLUTION SUBCUTANEOUS at 08:41

## 2023-05-22 RX ADMIN — PRAMIPEXOLE DIHYDROCHLORIDE 0.25 MG: 0.25 TABLET ORAL at 20:20

## 2023-05-22 RX ADMIN — AMIODARONE HYDROCHLORIDE 400 MG: 200 TABLET ORAL at 20:20

## 2023-05-22 RX ADMIN — FUROSEMIDE 20 MG/HR: 10 INJECTION, SOLUTION INTRAMUSCULAR; INTRAVENOUS at 05:01

## 2023-05-22 RX ADMIN — ATORVASTATIN CALCIUM 40 MG: 40 TABLET, FILM COATED ORAL at 20:20

## 2023-05-22 RX ADMIN — MORPHINE SULFATE 2 MG: 2 INJECTION, SOLUTION INTRAMUSCULAR; INTRAVENOUS at 17:17

## 2023-05-22 RX ADMIN — FUROSEMIDE 20 MG/HR: 10 INJECTION, SOLUTION INTRAMUSCULAR; INTRAVENOUS at 00:07

## 2023-05-22 RX ADMIN — POTASSIUM CHLORIDE 20 MEQ: 29.8 INJECTION, SOLUTION INTRAVENOUS at 11:57

## 2023-05-22 RX ADMIN — POLYETHYLENE GLYCOL 3350 17 G: 17 POWDER, FOR SOLUTION ORAL at 08:41

## 2023-05-22 RX ADMIN — CHLORHEXIDINE GLUCONATE 15 ML: 1.2 RINSE ORAL at 08:41

## 2023-05-22 RX ADMIN — INSULIN GLARGINE 11 UNITS: 100 INJECTION, SOLUTION SUBCUTANEOUS at 20:22

## 2023-05-22 RX ADMIN — POTASSIUM CHLORIDE 10 MEQ: 750 TABLET, EXTENDED RELEASE ORAL at 08:40

## 2023-05-22 RX ADMIN — POTASSIUM CHLORIDE 20 MEQ: 29.8 INJECTION, SOLUTION INTRAVENOUS at 05:58

## 2023-05-22 RX ADMIN — ACETAMINOPHEN 325MG 650 MG: 325 TABLET ORAL at 04:02

## 2023-05-22 RX ADMIN — DOBUTAMINE HYDROCHLORIDE 3 MCG/KG/MIN: 200 INJECTION INTRAVENOUS at 18:56

## 2023-05-22 RX ADMIN — MAGNESIUM OXIDE TAB 400 MG (240 MG ELEMENTAL MG) 400 MG: 400 (240 MG) TAB at 20:19

## 2023-05-22 RX ADMIN — ACETAMINOPHEN 325MG 650 MG: 325 TABLET ORAL at 16:55

## 2023-05-22 RX ADMIN — ASPIRIN 81 MG: 81 TABLET, COATED ORAL at 08:40

## 2023-05-22 RX ADMIN — OXYCODONE 5 MG: 5 TABLET ORAL at 10:15

## 2023-05-22 RX ADMIN — CHLORHEXIDINE GLUCONATE 15 ML: 1.2 RINSE ORAL at 21:22

## 2023-05-22 RX ADMIN — MAGNESIUM SULFATE HEPTAHYDRATE 2000 MG: 40 INJECTION, SOLUTION INTRAVENOUS at 01:38

## 2023-05-22 RX ADMIN — ALBUMIN (HUMAN) 25 G: 0.25 INJECTION, SOLUTION INTRAVENOUS at 12:27

## 2023-05-22 RX ADMIN — CLOPIDOGREL BISULFATE 75 MG: 75 TABLET ORAL at 08:40

## 2023-05-22 ASSESSMENT — PAIN DESCRIPTION - LOCATION
LOCATION: CHEST
LOCATION: ARM
LOCATION: CHEST
LOCATION: CHEST;ARM
LOCATION: CHEST;BACK

## 2023-05-22 ASSESSMENT — PAIN DESCRIPTION - DESCRIPTORS: DESCRIPTORS: TIGHTNESS;THROBBING

## 2023-05-22 ASSESSMENT — PAIN SCALES - GENERAL
PAINLEVEL_OUTOF10: 6
PAINLEVEL_OUTOF10: 6
PAINLEVEL_OUTOF10: 7
PAINLEVEL_OUTOF10: 8
PAINLEVEL_OUTOF10: 3

## 2023-05-22 ASSESSMENT — PAIN DESCRIPTION - ORIENTATION: ORIENTATION: LEFT

## 2023-05-22 NOTE — CARE COORDINATION
CM update. S/P CABG 7/90 with complicated recovery - remains on Dobutamine and Lasix gtts  From home with spouse. Will need updated PT/OT notes to determine discharge plan. Following.       Nguyễn Cohen RN

## 2023-05-23 ENCOUNTER — APPOINTMENT (OUTPATIENT)
Dept: GENERAL RADIOLOGY | Age: 68
DRG: 233 | End: 2023-05-23
Attending: INTERNAL MEDICINE
Payer: MEDICARE

## 2023-05-23 LAB
ANION GAP SERPL CALCULATED.3IONS-SCNC: 8 MMOL/L (ref 3–16)
BUN SERPL-MCNC: 25 MG/DL (ref 7–20)
CALCIUM SERPL-MCNC: 9.4 MG/DL (ref 8.3–10.6)
CHLORIDE SERPL-SCNC: 94 MMOL/L (ref 99–110)
CO2 SERPL-SCNC: 35 MMOL/L (ref 21–32)
CREAT SERPL-MCNC: 0.6 MG/DL (ref 0.6–1.2)
DEPRECATED RDW RBC AUTO: 14.9 % (ref 12.4–15.4)
GFR SERPLBLD CREATININE-BSD FMLA CKD-EPI: >60 ML/MIN/{1.73_M2}
GLUCOSE BLD-MCNC: 103 MG/DL (ref 70–99)
GLUCOSE BLD-MCNC: 118 MG/DL (ref 70–99)
GLUCOSE BLD-MCNC: 125 MG/DL (ref 70–99)
GLUCOSE BLD-MCNC: 126 MG/DL (ref 70–99)
GLUCOSE SERPL-MCNC: 117 MG/DL (ref 70–99)
HCT VFR BLD AUTO: 27.9 % (ref 36–48)
HGB BLD-MCNC: 9.2 G/DL (ref 12–16)
MAGNESIUM SERPL-MCNC: 2.2 MG/DL (ref 1.8–2.4)
MAGNESIUM SERPL-MCNC: 2.3 MG/DL (ref 1.8–2.4)
MAGNESIUM SERPL-MCNC: 2.3 MG/DL (ref 1.8–2.4)
MCH RBC QN AUTO: 28.5 PG (ref 26–34)
MCHC RBC AUTO-ENTMCNC: 33.1 G/DL (ref 31–36)
MCV RBC AUTO: 86.1 FL (ref 80–100)
PERFORMED ON: ABNORMAL
PLATELET # BLD AUTO: 198 K/UL (ref 135–450)
PMV BLD AUTO: 8.2 FL (ref 5–10.5)
POTASSIUM SERPL-SCNC: 4.1 MMOL/L (ref 3.5–5.1)
POTASSIUM SERPL-SCNC: 4.3 MMOL/L (ref 3.5–5.1)
POTASSIUM SERPL-SCNC: 4.4 MMOL/L (ref 3.5–5.1)
RBC # BLD AUTO: 3.24 M/UL (ref 4–5.2)
SODIUM SERPL-SCNC: 137 MMOL/L (ref 136–145)
WBC # BLD AUTO: 11.3 K/UL (ref 4–11)

## 2023-05-23 PROCEDURE — 6370000000 HC RX 637 (ALT 250 FOR IP): Performed by: THORACIC SURGERY (CARDIOTHORACIC VASCULAR SURGERY)

## 2023-05-23 PROCEDURE — 6360000002 HC RX W HCPCS: Performed by: THORACIC SURGERY (CARDIOTHORACIC VASCULAR SURGERY)

## 2023-05-23 PROCEDURE — 2580000003 HC RX 258: Performed by: NURSE PRACTITIONER

## 2023-05-23 PROCEDURE — 94669 MECHANICAL CHEST WALL OSCILL: CPT

## 2023-05-23 PROCEDURE — 80048 BASIC METABOLIC PNL TOTAL CA: CPT

## 2023-05-23 PROCEDURE — 97110 THERAPEUTIC EXERCISES: CPT

## 2023-05-23 PROCEDURE — 2100000000 HC CCU R&B

## 2023-05-23 PROCEDURE — 6370000000 HC RX 637 (ALT 250 FOR IP)

## 2023-05-23 PROCEDURE — 6370000000 HC RX 637 (ALT 250 FOR IP): Performed by: NURSE PRACTITIONER

## 2023-05-23 PROCEDURE — 83735 ASSAY OF MAGNESIUM: CPT

## 2023-05-23 PROCEDURE — 94640 AIRWAY INHALATION TREATMENT: CPT

## 2023-05-23 PROCEDURE — 2140000000 HC CCU INTERMEDIATE R&B

## 2023-05-23 PROCEDURE — 2580000003 HC RX 258: Performed by: THORACIC SURGERY (CARDIOTHORACIC VASCULAR SURGERY)

## 2023-05-23 PROCEDURE — 94761 N-INVAS EAR/PLS OXIMETRY MLT: CPT

## 2023-05-23 PROCEDURE — 85027 COMPLETE CBC AUTOMATED: CPT

## 2023-05-23 PROCEDURE — 84132 ASSAY OF SERUM POTASSIUM: CPT

## 2023-05-23 PROCEDURE — 97530 THERAPEUTIC ACTIVITIES: CPT

## 2023-05-23 PROCEDURE — 2700000000 HC OXYGEN THERAPY PER DAY

## 2023-05-23 PROCEDURE — 71045 X-RAY EXAM CHEST 1 VIEW: CPT

## 2023-05-23 PROCEDURE — 6360000002 HC RX W HCPCS: Performed by: NURSE PRACTITIONER

## 2023-05-23 PROCEDURE — 6360000002 HC RX W HCPCS

## 2023-05-23 RX ORDER — LOSARTAN POTASSIUM 25 MG/1
12.5 TABLET ORAL DAILY
Status: DISCONTINUED | OUTPATIENT
Start: 2023-05-23 | End: 2023-05-24

## 2023-05-23 RX ORDER — FUROSEMIDE 10 MG/ML
20 INJECTION INTRAMUSCULAR; INTRAVENOUS ONCE
Status: COMPLETED | OUTPATIENT
Start: 2023-05-23 | End: 2023-05-23

## 2023-05-23 RX ORDER — FUROSEMIDE 10 MG/ML
20 INJECTION INTRAMUSCULAR; INTRAVENOUS EVERY 6 HOURS
Status: DISCONTINUED | OUTPATIENT
Start: 2023-05-23 | End: 2023-05-24

## 2023-05-23 RX ADMIN — FAMOTIDINE 20 MG: 20 TABLET, FILM COATED ORAL at 08:29

## 2023-05-23 RX ADMIN — METOPROLOL TARTRATE 6.25 MG: 25 TABLET, FILM COATED ORAL at 09:56

## 2023-05-23 RX ADMIN — FONDAPARINUX SODIUM 2.5 MG: 2.5 INJECTION, SOLUTION SUBCUTANEOUS at 08:29

## 2023-05-23 RX ADMIN — ALBUTEROL SULFATE 2.5 MG: 2.5 SOLUTION RESPIRATORY (INHALATION) at 20:01

## 2023-05-23 RX ADMIN — INSULIN GLARGINE 11 UNITS: 100 INJECTION, SOLUTION SUBCUTANEOUS at 20:18

## 2023-05-23 RX ADMIN — POTASSIUM CHLORIDE 10 MEQ: 750 TABLET, EXTENDED RELEASE ORAL at 12:12

## 2023-05-23 RX ADMIN — POTASSIUM CHLORIDE 10 MEQ: 750 TABLET, EXTENDED RELEASE ORAL at 16:56

## 2023-05-23 RX ADMIN — FAMOTIDINE 20 MG: 20 TABLET, FILM COATED ORAL at 20:19

## 2023-05-23 RX ADMIN — ACETAMINOPHEN 325MG 650 MG: 325 TABLET ORAL at 04:52

## 2023-05-23 RX ADMIN — PRAMIPEXOLE DIHYDROCHLORIDE 0.25 MG: 0.25 TABLET ORAL at 20:19

## 2023-05-23 RX ADMIN — AMIODARONE HYDROCHLORIDE 400 MG: 200 TABLET ORAL at 20:19

## 2023-05-23 RX ADMIN — ALBUTEROL SULFATE 2.5 MG: 2.5 SOLUTION RESPIRATORY (INHALATION) at 08:44

## 2023-05-23 RX ADMIN — FUROSEMIDE 20 MG: 10 INJECTION, SOLUTION INTRAMUSCULAR; INTRAVENOUS at 20:19

## 2023-05-23 RX ADMIN — ALBUTEROL SULFATE 2.5 MG: 2.5 SOLUTION RESPIRATORY (INHALATION) at 16:00

## 2023-05-23 RX ADMIN — CLOPIDOGREL BISULFATE 75 MG: 75 TABLET ORAL at 08:29

## 2023-05-23 RX ADMIN — LOSARTAN POTASSIUM 12.5 MG: 25 TABLET, FILM COATED ORAL at 18:53

## 2023-05-23 RX ADMIN — AMIODARONE HYDROCHLORIDE 400 MG: 200 TABLET ORAL at 08:29

## 2023-05-23 RX ADMIN — ONDANSETRON 4 MG: 2 INJECTION INTRAMUSCULAR; INTRAVENOUS at 17:18

## 2023-05-23 RX ADMIN — METOPROLOL TARTRATE 6.25 MG: 25 TABLET, FILM COATED ORAL at 20:19

## 2023-05-23 RX ADMIN — SODIUM CHLORIDE, PRESERVATIVE FREE 10 ML: 5 INJECTION INTRAVENOUS at 20:20

## 2023-05-23 RX ADMIN — DIGOXIN 125 MCG: 125 TABLET ORAL at 08:29

## 2023-05-23 RX ADMIN — FUROSEMIDE 20 MG: 10 INJECTION, SOLUTION INTRAMUSCULAR; INTRAVENOUS at 15:44

## 2023-05-23 RX ADMIN — FUROSEMIDE 20 MG: 10 INJECTION, SOLUTION INTRAMUSCULAR; INTRAVENOUS at 14:29

## 2023-05-23 RX ADMIN — SODIUM CHLORIDE, PRESERVATIVE FREE 10 ML: 5 INJECTION INTRAVENOUS at 08:30

## 2023-05-23 RX ADMIN — ATORVASTATIN CALCIUM 40 MG: 40 TABLET, FILM COATED ORAL at 20:19

## 2023-05-23 RX ADMIN — FUROSEMIDE 5 MG/HR: 10 INJECTION, SOLUTION INTRAMUSCULAR; INTRAVENOUS at 06:45

## 2023-05-23 RX ADMIN — ACETAMINOPHEN 325MG 650 MG: 325 TABLET ORAL at 16:56

## 2023-05-23 RX ADMIN — ASPIRIN 81 MG: 81 TABLET, COATED ORAL at 08:29

## 2023-05-23 RX ADMIN — POTASSIUM CHLORIDE 10 MEQ: 750 TABLET, EXTENDED RELEASE ORAL at 08:29

## 2023-05-23 RX ADMIN — OXYCODONE 5 MG: 5 TABLET ORAL at 12:12

## 2023-05-23 RX ADMIN — ACETAMINOPHEN 325MG 650 MG: 325 TABLET ORAL at 11:02

## 2023-05-23 RX ADMIN — CHLORHEXIDINE GLUCONATE 15 ML: 1.2 RINSE ORAL at 20:20

## 2023-05-23 RX ADMIN — MAGNESIUM OXIDE TAB 400 MG (240 MG ELEMENTAL MG) 400 MG: 400 (240 MG) TAB at 08:29

## 2023-05-23 RX ADMIN — CHLORHEXIDINE GLUCONATE 15 ML: 1.2 RINSE ORAL at 08:30

## 2023-05-23 RX ADMIN — FUROSEMIDE 20 MG: 10 INJECTION, SOLUTION INTRAMUSCULAR; INTRAVENOUS at 09:56

## 2023-05-23 ASSESSMENT — PAIN SCALES - GENERAL
PAINLEVEL_OUTOF10: 6
PAINLEVEL_OUTOF10: 5
PAINLEVEL_OUTOF10: 0

## 2023-05-23 NOTE — CARE COORDINATION
[facility] Acute Rehab Unit   After review, this patient is felt to be:       []  Appropriate for Acute Inpatient Rehab    []  Appropriate for Acute Inpatient Rehab Pending Insurance Authorization    []  Not appropriate for Acute Inpatient Rehab    [x]  Referral received and ARU reviewing patient; Evaluation ongoing. Patient currently on IV lasix and would like to go home if possible. Will continue to follow for ARU appropriateness. Will notify DCP with further updates. Thank you for the referral.    Thank you.    Ramona Gibbs, [id number] [facility]  Clinical Liaison

## 2023-05-23 NOTE — CARE COORDINATION
PT recommending IPR. Writer met with pt and daughter Steph Araujo, explained difference between IPR, SNF, and HHC. Writer provided [employer] for SNF and IPR. Pt would prefer to go home in [place], but ultimately understands she will likely need rehab. Pt/daughter would like referral to [facility], writer completed. Of note, Steph Araujo will be out of town starting tomorrow until next Tuesday, pt has  and another daughter in town. CM will continue to follow pt's progress and coordinate discharge arrangements as appropriate.   [facility], JESUS-JAGDEEP

## 2023-05-24 ENCOUNTER — APPOINTMENT (OUTPATIENT)
Dept: GENERAL RADIOLOGY | Age: 68
DRG: 233 | End: 2023-05-24
Attending: INTERNAL MEDICINE
Payer: MEDICARE

## 2023-05-24 LAB
ANION GAP SERPL CALCULATED.3IONS-SCNC: 9 MMOL/L (ref 3–16)
BUN SERPL-MCNC: 33 MG/DL (ref 7–20)
CALCIUM SERPL-MCNC: 9.8 MG/DL (ref 8.3–10.6)
CHLORIDE SERPL-SCNC: 96 MMOL/L (ref 99–110)
CO2 SERPL-SCNC: 35 MMOL/L (ref 21–32)
CREAT SERPL-MCNC: 0.7 MG/DL (ref 0.6–1.2)
DEPRECATED RDW RBC AUTO: 15.2 % (ref 12.4–15.4)
GFR SERPLBLD CREATININE-BSD FMLA CKD-EPI: >60 ML/MIN/{1.73_M2}
GLUCOSE BLD-MCNC: 107 MG/DL (ref 70–99)
GLUCOSE BLD-MCNC: 133 MG/DL (ref 70–99)
GLUCOSE BLD-MCNC: 164 MG/DL (ref 70–99)
GLUCOSE BLD-MCNC: 166 MG/DL (ref 70–99)
GLUCOSE BLD-MCNC: 62 MG/DL (ref 70–99)
GLUCOSE SERPL-MCNC: 113 MG/DL (ref 70–99)
HCT VFR BLD AUTO: 30.5 % (ref 36–48)
HGB BLD-MCNC: 9.8 G/DL (ref 12–16)
LV EF: 28 %
LVEF MODALITY: NORMAL
MAGNESIUM SERPL-MCNC: 2.5 MG/DL (ref 1.8–2.4)
MCH RBC QN AUTO: 27.8 PG (ref 26–34)
MCHC RBC AUTO-ENTMCNC: 32.3 G/DL (ref 31–36)
MCV RBC AUTO: 86.2 FL (ref 80–100)
PERFORMED ON: ABNORMAL
PLATELET # BLD AUTO: 288 K/UL (ref 135–450)
PMV BLD AUTO: 8.4 FL (ref 5–10.5)
POTASSIUM SERPL-SCNC: 4.7 MMOL/L (ref 3.5–5.1)
RBC # BLD AUTO: 3.54 M/UL (ref 4–5.2)
SODIUM SERPL-SCNC: 140 MMOL/L (ref 136–145)
WBC # BLD AUTO: 10.3 K/UL (ref 4–11)

## 2023-05-24 PROCEDURE — 97535 SELF CARE MNGMENT TRAINING: CPT

## 2023-05-24 PROCEDURE — 85027 COMPLETE CBC AUTOMATED: CPT

## 2023-05-24 PROCEDURE — 6370000000 HC RX 637 (ALT 250 FOR IP): Performed by: STUDENT IN AN ORGANIZED HEALTH CARE EDUCATION/TRAINING PROGRAM

## 2023-05-24 PROCEDURE — 97530 THERAPEUTIC ACTIVITIES: CPT

## 2023-05-24 PROCEDURE — 80048 BASIC METABOLIC PNL TOTAL CA: CPT

## 2023-05-24 PROCEDURE — 94761 N-INVAS EAR/PLS OXIMETRY MLT: CPT

## 2023-05-24 PROCEDURE — 2100000000 HC CCU R&B

## 2023-05-24 PROCEDURE — 6360000002 HC RX W HCPCS: Performed by: THORACIC SURGERY (CARDIOTHORACIC VASCULAR SURGERY)

## 2023-05-24 PROCEDURE — 6370000000 HC RX 637 (ALT 250 FOR IP): Performed by: NURSE PRACTITIONER

## 2023-05-24 PROCEDURE — 94640 AIRWAY INHALATION TREATMENT: CPT

## 2023-05-24 PROCEDURE — 6370000000 HC RX 637 (ALT 250 FOR IP)

## 2023-05-24 PROCEDURE — 2140000000 HC CCU INTERMEDIATE R&B

## 2023-05-24 PROCEDURE — 2700000000 HC OXYGEN THERAPY PER DAY

## 2023-05-24 PROCEDURE — C8929 TTE W OR WO FOL WCON,DOPPLER: HCPCS

## 2023-05-24 PROCEDURE — 6370000000 HC RX 637 (ALT 250 FOR IP): Performed by: THORACIC SURGERY (CARDIOTHORACIC VASCULAR SURGERY)

## 2023-05-24 PROCEDURE — 83735 ASSAY OF MAGNESIUM: CPT

## 2023-05-24 PROCEDURE — 6360000004 HC RX CONTRAST MEDICATION

## 2023-05-24 PROCEDURE — 94669 MECHANICAL CHEST WALL OSCILL: CPT

## 2023-05-24 PROCEDURE — 71045 X-RAY EXAM CHEST 1 VIEW: CPT

## 2023-05-24 PROCEDURE — 2580000003 HC RX 258: Performed by: THORACIC SURGERY (CARDIOTHORACIC VASCULAR SURGERY)

## 2023-05-24 PROCEDURE — 6360000002 HC RX W HCPCS

## 2023-05-24 PROCEDURE — 97116 GAIT TRAINING THERAPY: CPT

## 2023-05-24 RX ORDER — SPIRONOLACTONE 25 MG/1
25 TABLET ORAL DAILY
Status: DISCONTINUED | OUTPATIENT
Start: 2023-05-24 | End: 2023-05-26 | Stop reason: HOSPADM

## 2023-05-24 RX ORDER — FUROSEMIDE 10 MG/ML
40 INJECTION INTRAMUSCULAR; INTRAVENOUS 3 TIMES DAILY
Status: DISCONTINUED | OUTPATIENT
Start: 2023-05-24 | End: 2023-05-25

## 2023-05-24 RX ORDER — LOSARTAN POTASSIUM 25 MG/1
25 TABLET ORAL DAILY
Status: DISCONTINUED | OUTPATIENT
Start: 2023-05-24 | End: 2023-05-26 | Stop reason: HOSPADM

## 2023-05-24 RX ORDER — SPIRONOLACTONE 25 MG/1
25 TABLET ORAL 2 TIMES DAILY
Status: DISCONTINUED | OUTPATIENT
Start: 2023-05-24 | End: 2023-05-24

## 2023-05-24 RX ADMIN — FUROSEMIDE 40 MG: 10 INJECTION, SOLUTION INTRAMUSCULAR; INTRAVENOUS at 20:31

## 2023-05-24 RX ADMIN — SPIRONOLACTONE 25 MG: 25 TABLET ORAL at 09:04

## 2023-05-24 RX ADMIN — PERFLUTREN 1.5 ML: 6.52 INJECTION, SUSPENSION INTRAVENOUS at 11:15

## 2023-05-24 RX ADMIN — FUROSEMIDE 40 MG: 10 INJECTION, SOLUTION INTRAMUSCULAR; INTRAVENOUS at 13:44

## 2023-05-24 RX ADMIN — ACETAMINOPHEN 325MG 650 MG: 325 TABLET ORAL at 16:50

## 2023-05-24 RX ADMIN — SODIUM CHLORIDE, PRESERVATIVE FREE 10 ML: 5 INJECTION INTRAVENOUS at 20:33

## 2023-05-24 RX ADMIN — FUROSEMIDE 20 MG: 10 INJECTION, SOLUTION INTRAMUSCULAR; INTRAVENOUS at 04:30

## 2023-05-24 RX ADMIN — INSULIN GLARGINE 11 UNITS: 100 INJECTION, SOLUTION SUBCUTANEOUS at 20:31

## 2023-05-24 RX ADMIN — PRAMIPEXOLE DIHYDROCHLORIDE 0.25 MG: 0.25 TABLET ORAL at 20:31

## 2023-05-24 RX ADMIN — CLOPIDOGREL BISULFATE 75 MG: 75 TABLET ORAL at 09:04

## 2023-05-24 RX ADMIN — ASPIRIN 81 MG: 81 TABLET, COATED ORAL at 09:04

## 2023-05-24 RX ADMIN — POTASSIUM CHLORIDE 10 MEQ: 750 TABLET, EXTENDED RELEASE ORAL at 09:04

## 2023-05-24 RX ADMIN — POTASSIUM CHLORIDE 10 MEQ: 750 TABLET, EXTENDED RELEASE ORAL at 12:33

## 2023-05-24 RX ADMIN — CHLORHEXIDINE GLUCONATE 15 ML: 1.2 RINSE ORAL at 20:33

## 2023-05-24 RX ADMIN — ATORVASTATIN CALCIUM 40 MG: 40 TABLET, FILM COATED ORAL at 20:31

## 2023-05-24 RX ADMIN — ALBUTEROL SULFATE 2.5 MG: 2.5 SOLUTION RESPIRATORY (INHALATION) at 20:12

## 2023-05-24 RX ADMIN — DIGOXIN 125 MCG: 125 TABLET ORAL at 09:04

## 2023-05-24 RX ADMIN — SODIUM CHLORIDE, PRESERVATIVE FREE 10 ML: 5 INJECTION INTRAVENOUS at 09:05

## 2023-05-24 RX ADMIN — FONDAPARINUX SODIUM 2.5 MG: 2.5 INJECTION, SOLUTION SUBCUTANEOUS at 09:05

## 2023-05-24 RX ADMIN — METOPROLOL TARTRATE 6.25 MG: 25 TABLET, FILM COATED ORAL at 09:04

## 2023-05-24 RX ADMIN — LOSARTAN POTASSIUM 25 MG: 25 TABLET, FILM COATED ORAL at 09:04

## 2023-05-24 RX ADMIN — FUROSEMIDE 40 MG: 10 INJECTION, SOLUTION INTRAMUSCULAR; INTRAVENOUS at 09:05

## 2023-05-24 RX ADMIN — ACETAMINOPHEN 325MG 650 MG: 325 TABLET ORAL at 12:32

## 2023-05-24 RX ADMIN — AMIODARONE HYDROCHLORIDE 400 MG: 200 TABLET ORAL at 09:04

## 2023-05-24 RX ADMIN — CHLORHEXIDINE GLUCONATE 15 ML: 1.2 RINSE ORAL at 09:05

## 2023-05-24 RX ADMIN — POTASSIUM CHLORIDE 10 MEQ: 750 TABLET, EXTENDED RELEASE ORAL at 16:50

## 2023-05-24 RX ADMIN — FAMOTIDINE 20 MG: 20 TABLET, FILM COATED ORAL at 09:04

## 2023-05-24 RX ADMIN — MAGNESIUM OXIDE TAB 400 MG (240 MG ELEMENTAL MG) 400 MG: 400 (240 MG) TAB at 20:31

## 2023-05-24 RX ADMIN — AMIODARONE HYDROCHLORIDE 400 MG: 200 TABLET ORAL at 20:31

## 2023-05-24 RX ADMIN — METOPROLOL TARTRATE 6.25 MG: 25 TABLET, FILM COATED ORAL at 20:31

## 2023-05-24 RX ADMIN — ALBUTEROL SULFATE 2.5 MG: 2.5 SOLUTION RESPIRATORY (INHALATION) at 08:16

## 2023-05-24 RX ADMIN — FAMOTIDINE 20 MG: 20 TABLET, FILM COATED ORAL at 20:31

## 2023-05-24 RX ADMIN — OXYCODONE 5 MG: 5 TABLET ORAL at 14:07

## 2023-05-24 RX ADMIN — ALBUTEROL SULFATE 2.5 MG: 2.5 SOLUTION RESPIRATORY (INHALATION) at 16:10

## 2023-05-24 RX ADMIN — INSULIN LISPRO 2 UNITS: 100 INJECTION, SOLUTION INTRAVENOUS; SUBCUTANEOUS at 12:32

## 2023-05-24 ASSESSMENT — PAIN SCALES - GENERAL: PAINLEVEL_OUTOF10: 0

## 2023-05-25 ENCOUNTER — APPOINTMENT (OUTPATIENT)
Dept: GENERAL RADIOLOGY | Age: 68
DRG: 233 | End: 2023-05-25
Attending: INTERNAL MEDICINE
Payer: MEDICARE

## 2023-05-25 PROBLEM — I25.5 ISCHEMIC CARDIOMYOPATHY: Status: ACTIVE | Noted: 2023-05-25

## 2023-05-25 PROBLEM — I50.41 ACUTE COMBINED SYSTOLIC AND DIASTOLIC HEART FAILURE (HCC): Status: ACTIVE | Noted: 2023-05-25

## 2023-05-25 LAB
ACTIVATED CLOTTING TIME: 113 SEC (ref 99–130)
ACTIVATED CLOTTING TIME: 123 SEC (ref 99–130)
ACTIVATED CLOTTING TIME: 386 SEC (ref 99–130)
ACTIVATED CLOTTING TIME: 436 SEC (ref 99–130)
ACTIVATED CLOTTING TIME: 494 SEC (ref 99–130)
ACTIVATED CLOTTING TIME: 501 SEC (ref 99–130)
ANION GAP SERPL CALCULATED.3IONS-SCNC: 8 MMOL/L (ref 3–16)
BUN SERPL-MCNC: 38 MG/DL (ref 7–20)
CALCIUM SERPL-MCNC: 9.4 MG/DL (ref 8.3–10.6)
CHLORIDE SERPL-SCNC: 95 MMOL/L (ref 99–110)
CO2 SERPL-SCNC: 36 MMOL/L (ref 21–32)
CREAT SERPL-MCNC: <0.5 MG/DL (ref 0.6–1.2)
DEPRECATED RDW RBC AUTO: 15.3 % (ref 12.4–15.4)
GFR SERPLBLD CREATININE-BSD FMLA CKD-EPI: >60 ML/MIN/{1.73_M2}
GLUCOSE BLD-MCNC: 116 MG/DL (ref 70–99)
GLUCOSE BLD-MCNC: 156 MG/DL (ref 70–99)
GLUCOSE BLD-MCNC: 162 MG/DL (ref 70–99)
GLUCOSE BLD-MCNC: 91 MG/DL (ref 70–99)
GLUCOSE SERPL-MCNC: 108 MG/DL (ref 70–99)
HCT VFR BLD AUTO: 31.7 % (ref 36–48)
HGB BLD-MCNC: 10.5 G/DL (ref 12–16)
MAGNESIUM SERPL-MCNC: 2.3 MG/DL (ref 1.8–2.4)
MCH RBC QN AUTO: 28.6 PG (ref 26–34)
MCHC RBC AUTO-ENTMCNC: 33.2 G/DL (ref 31–36)
MCV RBC AUTO: 86.1 FL (ref 80–100)
PERFORMED ON: ABNORMAL
PERFORMED ON: NORMAL
PLATELET # BLD AUTO: 314 K/UL (ref 135–450)
PMV BLD AUTO: 7.8 FL (ref 5–10.5)
POTASSIUM SERPL-SCNC: 4.3 MMOL/L (ref 3.5–5.1)
RBC # BLD AUTO: 3.68 M/UL (ref 4–5.2)
SODIUM SERPL-SCNC: 139 MMOL/L (ref 136–145)
WBC # BLD AUTO: 8.7 K/UL (ref 4–11)

## 2023-05-25 PROCEDURE — 97116 GAIT TRAINING THERAPY: CPT

## 2023-05-25 PROCEDURE — 80048 BASIC METABOLIC PNL TOTAL CA: CPT

## 2023-05-25 PROCEDURE — 6370000000 HC RX 637 (ALT 250 FOR IP): Performed by: STUDENT IN AN ORGANIZED HEALTH CARE EDUCATION/TRAINING PROGRAM

## 2023-05-25 PROCEDURE — 6370000000 HC RX 637 (ALT 250 FOR IP): Performed by: THORACIC SURGERY (CARDIOTHORACIC VASCULAR SURGERY)

## 2023-05-25 PROCEDURE — 94669 MECHANICAL CHEST WALL OSCILL: CPT

## 2023-05-25 PROCEDURE — 2700000000 HC OXYGEN THERAPY PER DAY

## 2023-05-25 PROCEDURE — 2580000003 HC RX 258: Performed by: THORACIC SURGERY (CARDIOTHORACIC VASCULAR SURGERY)

## 2023-05-25 PROCEDURE — 83735 ASSAY OF MAGNESIUM: CPT

## 2023-05-25 PROCEDURE — 99222 1ST HOSP IP/OBS MODERATE 55: CPT | Performed by: INTERNAL MEDICINE

## 2023-05-25 PROCEDURE — 71045 X-RAY EXAM CHEST 1 VIEW: CPT

## 2023-05-25 PROCEDURE — 94761 N-INVAS EAR/PLS OXIMETRY MLT: CPT

## 2023-05-25 PROCEDURE — 6370000000 HC RX 637 (ALT 250 FOR IP)

## 2023-05-25 PROCEDURE — 6360000002 HC RX W HCPCS: Performed by: THORACIC SURGERY (CARDIOTHORACIC VASCULAR SURGERY)

## 2023-05-25 PROCEDURE — 92610 EVALUATE SWALLOWING FUNCTION: CPT

## 2023-05-25 PROCEDURE — 6360000002 HC RX W HCPCS

## 2023-05-25 PROCEDURE — 85027 COMPLETE CBC AUTOMATED: CPT

## 2023-05-25 PROCEDURE — 92526 ORAL FUNCTION THERAPY: CPT

## 2023-05-25 PROCEDURE — 97530 THERAPEUTIC ACTIVITIES: CPT

## 2023-05-25 PROCEDURE — 94640 AIRWAY INHALATION TREATMENT: CPT

## 2023-05-25 PROCEDURE — 2100000000 HC CCU R&B

## 2023-05-25 PROCEDURE — 2140000000 HC CCU INTERMEDIATE R&B

## 2023-05-25 PROCEDURE — 6370000000 HC RX 637 (ALT 250 FOR IP): Performed by: NURSE PRACTITIONER

## 2023-05-25 RX ORDER — FUROSEMIDE 40 MG/1
40 TABLET ORAL 2 TIMES DAILY
Status: DISCONTINUED | OUTPATIENT
Start: 2023-05-26 | End: 2023-05-26 | Stop reason: HOSPADM

## 2023-05-25 RX ORDER — FUROSEMIDE 10 MG/ML
40 INJECTION INTRAMUSCULAR; INTRAVENOUS 2 TIMES DAILY
Status: COMPLETED | OUTPATIENT
Start: 2023-05-25 | End: 2023-05-25

## 2023-05-25 RX ADMIN — SODIUM CHLORIDE, PRESERVATIVE FREE 10 ML: 5 INJECTION INTRAVENOUS at 19:37

## 2023-05-25 RX ADMIN — ACETAMINOPHEN 325MG 650 MG: 325 TABLET ORAL at 23:04

## 2023-05-25 RX ADMIN — ACETAMINOPHEN 325MG 650 MG: 325 TABLET ORAL at 17:42

## 2023-05-25 RX ADMIN — LOSARTAN POTASSIUM 25 MG: 25 TABLET, FILM COATED ORAL at 08:37

## 2023-05-25 RX ADMIN — AMIODARONE HYDROCHLORIDE 400 MG: 200 TABLET ORAL at 19:35

## 2023-05-25 RX ADMIN — ACETAMINOPHEN 325MG 650 MG: 325 TABLET ORAL at 12:28

## 2023-05-25 RX ADMIN — FUROSEMIDE 40 MG: 10 INJECTION, SOLUTION INTRAMUSCULAR; INTRAVENOUS at 17:42

## 2023-05-25 RX ADMIN — INSULIN LISPRO 1 UNITS: 100 INJECTION, SOLUTION INTRAVENOUS; SUBCUTANEOUS at 20:46

## 2023-05-25 RX ADMIN — FAMOTIDINE 20 MG: 20 TABLET, FILM COATED ORAL at 08:37

## 2023-05-25 RX ADMIN — DIGOXIN 125 MCG: 125 TABLET ORAL at 08:37

## 2023-05-25 RX ADMIN — METOPROLOL TARTRATE 6.25 MG: 25 TABLET, FILM COATED ORAL at 19:36

## 2023-05-25 RX ADMIN — SPIRONOLACTONE 25 MG: 25 TABLET ORAL at 08:36

## 2023-05-25 RX ADMIN — SODIUM CHLORIDE, PRESERVATIVE FREE 10 ML: 5 INJECTION INTRAVENOUS at 08:38

## 2023-05-25 RX ADMIN — ALBUTEROL SULFATE 2.5 MG: 2.5 SOLUTION RESPIRATORY (INHALATION) at 15:45

## 2023-05-25 RX ADMIN — CHLORHEXIDINE GLUCONATE 15 ML: 1.2 RINSE ORAL at 19:37

## 2023-05-25 RX ADMIN — ONDANSETRON 4 MG: 2 INJECTION INTRAMUSCULAR; INTRAVENOUS at 08:36

## 2023-05-25 RX ADMIN — AMIODARONE HYDROCHLORIDE 400 MG: 200 TABLET ORAL at 08:36

## 2023-05-25 RX ADMIN — METOPROLOL TARTRATE 6.25 MG: 25 TABLET, FILM COATED ORAL at 08:36

## 2023-05-25 RX ADMIN — INSULIN GLARGINE 11 UNITS: 100 INJECTION, SOLUTION SUBCUTANEOUS at 20:44

## 2023-05-25 RX ADMIN — POTASSIUM CHLORIDE 10 MEQ: 750 TABLET, EXTENDED RELEASE ORAL at 17:42

## 2023-05-25 RX ADMIN — INSULIN LISPRO 2 UNITS: 100 INJECTION, SOLUTION INTRAVENOUS; SUBCUTANEOUS at 12:24

## 2023-05-25 RX ADMIN — POTASSIUM CHLORIDE 10 MEQ: 750 TABLET, EXTENDED RELEASE ORAL at 12:29

## 2023-05-25 RX ADMIN — ATORVASTATIN CALCIUM 40 MG: 40 TABLET, FILM COATED ORAL at 19:36

## 2023-05-25 RX ADMIN — CHLORHEXIDINE GLUCONATE 15 ML: 1.2 RINSE ORAL at 08:36

## 2023-05-25 RX ADMIN — ALBUTEROL SULFATE 2.5 MG: 2.5 SOLUTION RESPIRATORY (INHALATION) at 20:04

## 2023-05-25 RX ADMIN — FAMOTIDINE 20 MG: 20 TABLET, FILM COATED ORAL at 19:36

## 2023-05-25 RX ADMIN — PRAMIPEXOLE DIHYDROCHLORIDE 0.25 MG: 0.25 TABLET ORAL at 20:47

## 2023-05-25 RX ADMIN — ASPIRIN 81 MG: 81 TABLET, COATED ORAL at 08:37

## 2023-05-25 RX ADMIN — POTASSIUM CHLORIDE 10 MEQ: 750 TABLET, EXTENDED RELEASE ORAL at 08:36

## 2023-05-25 RX ADMIN — FONDAPARINUX SODIUM 2.5 MG: 2.5 INJECTION, SOLUTION SUBCUTANEOUS at 08:37

## 2023-05-25 RX ADMIN — ACETAMINOPHEN 325MG 650 MG: 325 TABLET ORAL at 05:44

## 2023-05-25 RX ADMIN — CLOPIDOGREL BISULFATE 75 MG: 75 TABLET ORAL at 08:37

## 2023-05-25 RX ADMIN — FUROSEMIDE 40 MG: 10 INJECTION, SOLUTION INTRAMUSCULAR; INTRAVENOUS at 08:36

## 2023-05-25 NOTE — DISCHARGE INSTRUCTIONS
Discharge Instructions for Open Heart Surgery    What you will need at home:               * Accurate Scale               *  Digital Thermometer               *  Antibacterial Soap                *  Clean Wash Cloths             *  Someone to be with you for one week              DO NOT LIFT, PUSH, OR PULL ANYTHING OVER 5 POUNDS FOR 8 WEEK from the day of surgery. This could prevent your sternum from healing properly. ? When you are given permission from your surgeon to begin lifting again,                     do so gradually. You will need time to build your muscle strength. ? A gallon of milk is more than 5 lbs. you should buy ½ gallons. NEVER SMOKE AGAIN! Absolutely no tobacco products! Do not allow others to smoke around you. Second hand smoke can be just as bad. [employer] has a free program available, call 4-210.119.1030. Activity: See your activity diary in your binder for your walking plan. Plan to walk indoors on days the temperature is below 40? or over 80? or during smog alerts. At first limit your stair climbing to once or twice a day. You may use the handrail for balance only. Do not pull yourself up with it. It is not unusual to feel tired for the first few weeks, but walking builds up your strength. Driving: Do not drive a car or any vehicle for 4 weeks from the day of surgery. You can not drive a truck, tractor or  for 8 weeks from the day of surgery. After 4 weeks, you can drive vehicles with power steering only. Do not drive while on pain medication. Incentive Spirometer:  Continue to use your lung exerciser for the next 4 weeks. Support your chest and cough each time you complete the 10 exercises. Weight:  Weigh yourself every morning. Call the surgeon if you gain 3 pounds or more overnight or 5 pounds in a week. This may be a sign of fluid retention.     Medication:    Pain pills are ordered to keep you

## 2023-05-25 NOTE — PLAN OF CARE
SLP completed evaluation. Please refer to notes in EMR. Ana Azar, [id number] [facility] UY[facility][id number]  Speech-Language Pathologist

## 2023-05-25 NOTE — CONSULTS
Cardiology consult called.
Patient: Khanh Linares  [id number]  Date: 5/24/2023      Chief Complaint: chest pain, weakness, shortness of breath    History of Present Illness/Hospital Course:  Nancy Aponte is a 70year old female with a past medical history significant for CAD s/p stents, COPD, combined heart failure, HTN, HLD and obesity who presented to [facility] with one month of weakness, shortness of breath, and chest pain and 2 weeks of RLE pain. She was found to have mild troponin elevation, d-dimer, and right popliteal and distal posterior tibial artery occlusions. She was started on heparin and Cardiology was consulted. She was transferred to [facility] for Cardiac and Vascular evaluation. On 5/16 she underwent cardiac catheterization which revealed multivessel disease. CT surgery was consulted and on 5/19 she underwent CABG x3 with left atrial appendage clip. She was evaluated by Vascular surgery who recommended outpatient follow up for popliteal and TP trunk occlusion with claudication. Her course has been notable for acute blood loss anemia and pulmonary insufficiency. She continues to have functional deficits below her baseline. Today she is seen with her daughter present. She notes some continued sternal discomfort and shortness of breath, but feels improved. She has progressed with therapies and is hopeful to be able to discharge home.      has a past medical history of CAD (coronary artery disease), Combined congestive systolic and diastolic heart failure ([address] Hypertension, and PONV (postoperative nausea and vomiting). has a past surgical history that includes Foot surgery (Bilateral); Hand surgery (Left); Hysterectomy; Coronary angioplasty with stent; eye surgery (Left, 07/01/2016); eye surgery (Right, 07/22/2016); Foot surgery (Left); Foot Amputation (Left, 12/23/2020); and Coronary artery bypass graft (N/A, 5/19/2023). reports that she has been smoking cigarettes.  She has a 60.00 pack-year
Vascular Surgery consult called to office.
four quadrants,  no masses, no organomegaly  Extremities - Extremities normal, atraumatic, no cyanosis or edema  Pulses - 2+ and symmetric upper and lower extremities  Skin - Skin color, texture, turgor normal, no rashes or lesions  Psych - Normal mood and affect  Neurologic - Normal gross motor and sensory exam.        LABS  Lab Results   Component Value Date/Time    WBC 8.7 05/25/2023 05:18 AM    RBC 3.68 05/25/2023 05:18 AM    HGB 10.5 05/25/2023 05:18 AM    HCT 31.7 05/25/2023 05:18 AM    MCV 86.1 05/25/2023 05:18 AM    RDW 15.3 05/25/2023 05:18 AM     05/25/2023 05:18 AM     Lab Results   Component Value Date/Time     05/25/2023 05:18 AM    K 4.3 05/25/2023 05:18 AM    K 4.0 05/17/2023 05:11 AM    CL 95 05/25/2023 05:18 AM    CO2 36 05/25/2023 05:18 AM    BUN 38 05/25/2023 05:18 AM    CREATININE <0.5 05/25/2023 05:18 AM    AGRATIO 1.2 05/15/2023 09:46 PM    LABGLOM >60 05/25/2023 05:18 AM    GLUCOSE 108 05/25/2023 05:18 AM    PROT 6.2 05/19/2023 03:42 AM    CALCIUM 9.4 05/25/2023 05:18 AM    BILITOT <0.2 05/19/2023 03:42 AM    ALKPHOS 77 05/19/2023 03:42 AM    AST 13 05/19/2023 03:42 AM    ALT 12 05/19/2023 03:42 AM     No results found for: PTINR  No results found for: CKTOTAL, CKMB, CKMBINDEX, TROPONINI  Lab Results   Component Value Date    CHOL 276 (H) 05/19/2023     Lab Results   Component Value Date    TRIG 139 05/19/2023     Lab Results   Component Value Date    HDL 41 05/19/2023     Lab Results   Component Value Date    LDLCALC 207 (H) 05/19/2023     No results found for: [facility]      CARDIAC STUDIES    EKG -normal sinus rhythm, possible old anterior infarct    Echo - 5/23/23   Technically difficult study with poor acoustic windows. Definity contrast   administered for endocardial border enhancement. Left ventricular systolic function is severely reduced with an ejection   fraction of 25-30%.    There is hypokinesis of the anterior, anteroseptal, inferoseptal, apical   anterior,

## 2023-05-25 NOTE — CARE COORDINATION
[facility]    Out of [facility] service area    Patient has no preference     Referral sent to [address] [place] [address] care accepted by [facility] [phone]    Frances Rios RN, BSN CTN  [facility] [phone]

## 2023-05-25 NOTE — PLAN OF CARE
Problem: Chronic Conditions and Co-morbidities  Goal: Patient's chronic conditions and co-morbidity symptoms are monitored and maintained or improved  5/25/2023 0745 by Tiny Worley RN  Outcome: Progressing  5/25/2023 0607 by Stephanie Bullock RN  Outcome: Progressing     Problem: Discharge Planning  Goal: Discharge to home or other facility with appropriate resources  5/25/2023 0745 by Tiny Worley RN  Outcome: Progressing  5/25/2023 0607 by Stephanie Bullock RN  Outcome: Progressing     Problem: Pain  Goal: Verbalizes/displays adequate comfort level or baseline comfort level  5/25/2023 0745 by Tiny Worley RN  Outcome: Progressing  5/25/2023 0607 by Stephanie Bullock RN  Outcome: Progressing     Problem: Safety - Adult  Goal: Free from fall injury  5/25/2023 0745 by Tiny Worley RN  Outcome: Progressing  5/25/2023 0607 by Stephanie Bullock RN  Outcome: Progressing     Problem: Safety - Adult  Goal: Free from fall injury  5/25/2023 0745 by Tiny Worley RN  Outcome: Progressing  5/25/2023 0607 by Stephanie Bullock RN  Outcome: Progressing     Problem: ABCDS Injury Assessment  Goal: Absence of physical injury  5/25/2023 0745 by Tiny Worley RN  Outcome: Progressing  5/25/2023 0607 by Stephanie Bullock RN  Outcome: Progressing     Problem: Neurosensory - Adult  Goal: Achieves stable or improved neurological status  Outcome: Progressing  Flowsheets (Taken 5/24/2023 2000 by Stephanie Bullock RN)  Achieves stable or improved neurological status:   Assess for and report changes in neurological status   Initiate measures to prevent increased intracranial pressure   Maintain blood pressure and fluid volume within ordered parameters to optimize cerebral perfusion and minimize risk of hemorrhage  Goal: Absence of seizures  Outcome: Progressing  Goal: Remains free of injury related to seizures activity  Outcome: Progressing  Goal: Achieves maximal functionality and self care  Outcome: Progressing

## 2023-05-25 NOTE — CARE COORDINATION
LOS review. PD #10. S/p CABG with complicated recovery. ARU has been following, however  patient has improved and now plans discharge home with [facility]. This writer contacted L[facility] Serrano [facility]) for referral. [facility] does not service [place], however she will pass referral on and get back with us. Following.       Denton Sewell RN

## 2023-05-25 NOTE — DISCHARGE INSTR - DIET

## 2023-05-25 NOTE — PLAN OF CARE
Problem: Chronic Conditions and Co-morbidities  Goal: Patient's chronic conditions and co-morbidity symptoms are monitored and maintained or improved  Outcome: Progressing     Problem: Discharge Planning  Goal: Discharge to home or other facility with appropriate resources  Outcome: Progressing     Problem: Pain  Goal: Verbalizes/displays adequate comfort level or baseline comfort level  Outcome: Progressing     Problem: Safety - Adult  Goal: Free from fall injury  Outcome: Progressing     Problem: ABCDS Injury Assessment  Goal: Absence of physical injury  Outcome: Progressing     Problem: Respiratory - Adult  Goal: Achieves optimal ventilation and oxygenation  Outcome: Progressing     Problem: Cardiovascular - Adult  Goal: Maintains optimal cardiac output and hemodynamic stability  Outcome: Progressing  Goal: Absence of cardiac dysrhythmias or at baseline  Outcome: Progressing     Problem: Skin/Tissue Integrity - Adult  Goal: Skin integrity remains intact  Outcome: Progressing  Goal: Incisions, wounds, or drain sites healing without S/S of infection  Outcome: Progressing  Goal: Oral mucous membranes remain intact  Outcome: Progressing     Problem: Musculoskeletal - Adult  Goal: Return mobility to safest level of function  Outcome: Progressing  Goal: Maintain proper alignment of affected body part  Outcome: Progressing  Goal: Return ADL status to a safe level of function  Outcome: Progressing     Problem: Genitourinary - Adult  Goal: Absence of urinary retention  Outcome: Progressing  Goal: Urinary catheter remains patent  Outcome: Progressing     Problem: Infection - Adult  Goal: Absence of infection at discharge  Outcome: Progressing  Goal: Absence of infection during hospitalization  Outcome: Progressing  Goal: Absence of fever/infection during anticipated neutropenic period  Outcome: Progressing

## 2023-05-26 ENCOUNTER — APPOINTMENT (OUTPATIENT)
Dept: CT IMAGING | Age: 68
DRG: 233 | End: 2023-05-26
Attending: INTERNAL MEDICINE
Payer: MEDICARE

## 2023-05-26 ENCOUNTER — APPOINTMENT (OUTPATIENT)
Dept: GENERAL RADIOLOGY | Age: 68
DRG: 233 | End: 2023-05-26
Attending: INTERNAL MEDICINE
Payer: MEDICARE

## 2023-05-26 VITALS
RESPIRATION RATE: 22 BRPM | BODY MASS INDEX: 31.72 KG/M2 | WEIGHT: 167.99 LBS | OXYGEN SATURATION: 91 % | DIASTOLIC BLOOD PRESSURE: 69 MMHG | HEIGHT: 61 IN | HEART RATE: 67 BPM | TEMPERATURE: 97.8 F | SYSTOLIC BLOOD PRESSURE: 113 MMHG

## 2023-05-26 PROBLEM — I21.4 NSTEMI (NON-ST ELEVATED MYOCARDIAL INFARCTION) (HCC): Status: RESOLVED | Noted: 2023-05-16 | Resolved: 2023-05-26

## 2023-05-26 PROBLEM — R07.9 CHEST PAIN: Status: RESOLVED | Noted: 2023-05-15 | Resolved: 2023-05-26

## 2023-05-26 LAB
ANION GAP SERPL CALCULATED.3IONS-SCNC: 8 MMOL/L (ref 3–16)
BUN SERPL-MCNC: 40 MG/DL (ref 7–20)
CALCIUM SERPL-MCNC: 9.7 MG/DL (ref 8.3–10.6)
CHLORIDE SERPL-SCNC: 94 MMOL/L (ref 99–110)
CO2 SERPL-SCNC: 37 MMOL/L (ref 21–32)
CREAT SERPL-MCNC: 0.8 MG/DL (ref 0.6–1.2)
DEPRECATED RDW RBC AUTO: 15.3 % (ref 12.4–15.4)
GFR SERPLBLD CREATININE-BSD FMLA CKD-EPI: >60 ML/MIN/{1.73_M2}
GLUCOSE BLD-MCNC: 110 MG/DL (ref 70–99)
GLUCOSE BLD-MCNC: 159 MG/DL (ref 70–99)
GLUCOSE BLD-MCNC: 97 MG/DL (ref 70–99)
GLUCOSE SERPL-MCNC: 110 MG/DL (ref 70–99)
HCT VFR BLD AUTO: 31.2 % (ref 36–48)
HGB BLD-MCNC: 10.1 G/DL (ref 12–16)
MAGNESIUM SERPL-MCNC: 2.4 MG/DL (ref 1.8–2.4)
MCH RBC QN AUTO: 28.1 PG (ref 26–34)
MCHC RBC AUTO-ENTMCNC: 32.4 G/DL (ref 31–36)
MCV RBC AUTO: 86.8 FL (ref 80–100)
PERFORMED ON: ABNORMAL
PERFORMED ON: ABNORMAL
PERFORMED ON: NORMAL
PLATELET # BLD AUTO: 419 K/UL (ref 135–450)
PMV BLD AUTO: 8 FL (ref 5–10.5)
POTASSIUM SERPL-SCNC: 4.7 MMOL/L (ref 3.5–5.1)
RBC # BLD AUTO: 3.59 M/UL (ref 4–5.2)
SODIUM SERPL-SCNC: 139 MMOL/L (ref 136–145)
WBC # BLD AUTO: 11.7 K/UL (ref 4–11)

## 2023-05-26 PROCEDURE — 2580000003 HC RX 258: Performed by: THORACIC SURGERY (CARDIOTHORACIC VASCULAR SURGERY)

## 2023-05-26 PROCEDURE — 71045 X-RAY EXAM CHEST 1 VIEW: CPT

## 2023-05-26 PROCEDURE — 2700000000 HC OXYGEN THERAPY PER DAY

## 2023-05-26 PROCEDURE — 80048 BASIC METABOLIC PNL TOTAL CA: CPT

## 2023-05-26 PROCEDURE — 0W9B3ZZ DRAINAGE OF LEFT PLEURAL CAVITY, PERCUTANEOUS APPROACH: ICD-10-PCS | Performed by: RADIOLOGY

## 2023-05-26 PROCEDURE — C1729 CATH, DRAINAGE: HCPCS

## 2023-05-26 PROCEDURE — 6360000002 HC RX W HCPCS: Performed by: THORACIC SURGERY (CARDIOTHORACIC VASCULAR SURGERY)

## 2023-05-26 PROCEDURE — 6370000000 HC RX 637 (ALT 250 FOR IP): Performed by: STUDENT IN AN ORGANIZED HEALTH CARE EDUCATION/TRAINING PROGRAM

## 2023-05-26 PROCEDURE — 6370000000 HC RX 637 (ALT 250 FOR IP)

## 2023-05-26 PROCEDURE — 6370000000 HC RX 637 (ALT 250 FOR IP): Performed by: NURSE PRACTITIONER

## 2023-05-26 PROCEDURE — 71250 CT THORAX DX C-: CPT

## 2023-05-26 PROCEDURE — 83735 ASSAY OF MAGNESIUM: CPT

## 2023-05-26 PROCEDURE — 6370000000 HC RX 637 (ALT 250 FOR IP): Performed by: THORACIC SURGERY (CARDIOTHORACIC VASCULAR SURGERY)

## 2023-05-26 PROCEDURE — 99232 SBSQ HOSP IP/OBS MODERATE 35: CPT | Performed by: INTERNAL MEDICINE

## 2023-05-26 PROCEDURE — 94640 AIRWAY INHALATION TREATMENT: CPT

## 2023-05-26 PROCEDURE — 94669 MECHANICAL CHEST WALL OSCILL: CPT

## 2023-05-26 PROCEDURE — 36415 COLL VENOUS BLD VENIPUNCTURE: CPT

## 2023-05-26 PROCEDURE — 85027 COMPLETE CBC AUTOMATED: CPT

## 2023-05-26 PROCEDURE — 94761 N-INVAS EAR/PLS OXIMETRY MLT: CPT

## 2023-05-26 PROCEDURE — 77012 CT SCAN FOR NEEDLE BIOPSY: CPT

## 2023-05-26 RX ORDER — LOSARTAN POTASSIUM 25 MG/1
25 TABLET ORAL DAILY
Qty: 30 TABLET | Refills: 0 | Status: SHIPPED | OUTPATIENT
Start: 2023-05-27

## 2023-05-26 RX ORDER — BISACODYL 5 MG/1
5 TABLET, DELAYED RELEASE ORAL DAILY PRN
Qty: 14 TABLET | Refills: 0 | Status: SHIPPED | OUTPATIENT
Start: 2023-05-26 | End: 2023-06-09

## 2023-05-26 RX ORDER — SPIRONOLACTONE 25 MG/1
25 TABLET ORAL DAILY
Qty: 30 TABLET | Refills: 0 | Status: SHIPPED | OUTPATIENT
Start: 2023-05-27

## 2023-05-26 RX ORDER — OXYCODONE HYDROCHLORIDE 5 MG/1
5 TABLET ORAL EVERY 6 HOURS PRN
Qty: 28 TABLET | Refills: 0 | Status: SHIPPED | OUTPATIENT
Start: 2023-05-26 | End: 2023-06-02

## 2023-05-26 RX ORDER — AMIODARONE HYDROCHLORIDE 100 MG/1
200 TABLET ORAL DAILY
Qty: 28 TABLET | Refills: 0 | Status: SHIPPED | OUTPATIENT
Start: 2023-05-26 | End: 2023-06-09

## 2023-05-26 RX ORDER — POTASSIUM CHLORIDE 750 MG/1
10 TABLET, EXTENDED RELEASE ORAL 2 TIMES DAILY
Qty: 60 TABLET | Refills: 0 | Status: SHIPPED | OUTPATIENT
Start: 2023-05-26 | End: 2023-05-30

## 2023-05-26 RX ORDER — DIGOXIN 125 MCG
125 TABLET ORAL DAILY
Qty: 30 TABLET | Refills: 0 | Status: SHIPPED | OUTPATIENT
Start: 2023-05-27

## 2023-05-26 RX ORDER — ASPIRIN 81 MG/1
81 TABLET ORAL DAILY
Qty: 30 TABLET | Refills: 3 | Status: SHIPPED | OUTPATIENT
Start: 2023-05-27

## 2023-05-26 RX ORDER — LANOLIN ALCOHOL/MO/W.PET/CERES
400 CREAM (GRAM) TOPICAL DAILY
Qty: 14 TABLET | Refills: 0 | Status: SHIPPED | OUTPATIENT
Start: 2023-05-26 | End: 2023-06-09

## 2023-05-26 RX ORDER — FUROSEMIDE 40 MG/1
40 TABLET ORAL 2 TIMES DAILY
Qty: 60 TABLET | Refills: 0 | Status: SHIPPED | OUTPATIENT
Start: 2023-05-26 | End: 2023-05-30 | Stop reason: SDUPTHER

## 2023-05-26 RX ORDER — ATORVASTATIN CALCIUM 40 MG/1
40 TABLET, FILM COATED ORAL NIGHTLY
Qty: 30 TABLET | Refills: 0 | Status: SHIPPED | OUTPATIENT
Start: 2023-05-26

## 2023-05-26 RX ADMIN — DIGOXIN 125 MCG: 125 TABLET ORAL at 08:50

## 2023-05-26 RX ADMIN — ALBUTEROL SULFATE 2.5 MG: 2.5 SOLUTION RESPIRATORY (INHALATION) at 08:02

## 2023-05-26 RX ADMIN — SPIRONOLACTONE 25 MG: 25 TABLET ORAL at 08:49

## 2023-05-26 RX ADMIN — MAGNESIUM OXIDE TAB 400 MG (240 MG ELEMENTAL MG) 400 MG: 400 (240 MG) TAB at 08:49

## 2023-05-26 RX ADMIN — AMIODARONE HYDROCHLORIDE 400 MG: 200 TABLET ORAL at 08:49

## 2023-05-26 RX ADMIN — ALBUTEROL SULFATE 2.5 MG: 2.5 SOLUTION RESPIRATORY (INHALATION) at 16:42

## 2023-05-26 RX ADMIN — FONDAPARINUX SODIUM 2.5 MG: 2.5 INJECTION, SOLUTION SUBCUTANEOUS at 08:48

## 2023-05-26 RX ADMIN — OXYCODONE 5 MG: 5 TABLET ORAL at 18:19

## 2023-05-26 RX ADMIN — CLOPIDOGREL BISULFATE 75 MG: 75 TABLET ORAL at 08:50

## 2023-05-26 RX ADMIN — LOSARTAN POTASSIUM 25 MG: 25 TABLET, FILM COATED ORAL at 08:48

## 2023-05-26 RX ADMIN — POTASSIUM CHLORIDE 10 MEQ: 750 TABLET, EXTENDED RELEASE ORAL at 08:49

## 2023-05-26 RX ADMIN — CHLORHEXIDINE GLUCONATE 15 ML: 1.2 RINSE ORAL at 08:53

## 2023-05-26 RX ADMIN — FAMOTIDINE 20 MG: 20 TABLET, FILM COATED ORAL at 08:48

## 2023-05-26 RX ADMIN — POTASSIUM CHLORIDE 10 MEQ: 750 TABLET, EXTENDED RELEASE ORAL at 11:24

## 2023-05-26 RX ADMIN — FUROSEMIDE 40 MG: 40 TABLET ORAL at 08:48

## 2023-05-26 RX ADMIN — ACETAMINOPHEN 325MG 650 MG: 325 TABLET ORAL at 11:24

## 2023-05-26 RX ADMIN — METOPROLOL TARTRATE 6.25 MG: 25 TABLET, FILM COATED ORAL at 08:49

## 2023-05-26 RX ADMIN — INSULIN LISPRO 2 UNITS: 100 INJECTION, SOLUTION INTRAVENOUS; SUBCUTANEOUS at 11:30

## 2023-05-26 RX ADMIN — ACETAMINOPHEN 325MG 650 MG: 325 TABLET ORAL at 06:00

## 2023-05-26 RX ADMIN — SODIUM CHLORIDE, PRESERVATIVE FREE 10 ML: 5 INJECTION INTRAVENOUS at 10:49

## 2023-05-26 RX ADMIN — POTASSIUM CHLORIDE 10 MEQ: 750 TABLET, EXTENDED RELEASE ORAL at 17:33

## 2023-05-26 RX ADMIN — ASPIRIN 81 MG: 81 TABLET, COATED ORAL at 08:49

## 2023-05-26 NOTE — CARE COORDINATION
LOS review. PD #11. S/p CABG with complicated recovery. ARU has been following, however  patient has improved and now plans discharge home with [facility]. [facility] following. Will need 4 WW order placed prior to Dc. Following.   Hermilo Denson RN

## 2023-05-26 NOTE — PLAN OF CARE
Problem: Chronic Conditions and Co-morbidities  Goal: Patient's chronic conditions and co-morbidity symptoms are monitored and maintained or improved  Recent Flowsheet Documentation  Taken 5/25/2023 2000 by Chris Feldman RN  Care Plan - Patient's Chronic Conditions and Co-Morbidity Symptoms are Monitored and Maintained or Improved:   Monitor and assess patient's chronic conditions and comorbid symptoms for stability, deterioration, or improvement   Collaborate with multidisciplinary team to address chronic and comorbid conditions and prevent exacerbation or deterioration   Update acute care plan with appropriate goals if chronic or comorbid symptoms are exacerbated and prevent overall improvement and discharge  5/25/2023 0745 by Jerardo Corrales RN  Outcome: Progressing     Problem: Pain  Goal: Verbalizes/displays adequate comfort level or baseline comfort level  Recent Flowsheet Documentation  Taken 5/25/2023 2000 by Chris Feldman RN  Verbalizes/displays adequate comfort level or baseline comfort level:   Encourage patient to monitor pain and request assistance   Assess pain using appropriate pain scale   Administer analgesics based on type and severity of pain and evaluate response   Implement non-pharmacological measures as appropriate and evaluate response   Consider cultural and social influences on pain and pain management   Notify Licensed Independent Practitioner if interventions unsuccessful or patient reports new pain  5/25/2023 0745 by Jerardo Corrales RN  Outcome: Progressing     Problem: Safety - Adult  Goal: Free from fall injury  5/25/2023 0745 by Jerardo Corrales RN  Outcome: Progressing     Problem: Respiratory - Adult  Goal: Achieves optimal ventilation and oxygenation  Recent Flowsheet Documentation  Taken 5/25/2023 2000 by Chris Feldman RN  Achieves optimal ventilation and oxygenation:   Assess for changes in respiratory status   Assess for changes in mentation and behavior   Position to

## 2023-05-26 NOTE — CARE COORDINATION
CM sent referral to [facility] with [employer] for home oxygen and rollator.  Walk test done and home O2 order in. Tony Vasquez RN

## 2023-05-26 NOTE — CARE COORDINATION
CASE MANAGEMENT DISCHARGE SUMMARY      Discharge to: Home with spouse and home care    Precertification completed: [address] Exemption Notification (HENS) completed: n/a    IMM given: 05/26/23    New Durable Medical Equipment ordered/agency: [facility] for home oxygen and rollator    Transportation:    Family/car:Private       Confirmed discharge plan with:     Patient: yes     Family:  patient notified     Facility/Agency, name:     [facility] [address]   [address] [place] [address]   [phone] VÍCTOR/AVS faxed        RN, name: Anna Jefferson RN    Note: Discharging nurse to complete VÍCTOR, reconcile AVS, and place final copy with patient's discharge packet. RN to ensure that written prescriptions for  Level II medications are sent with patient to the facility as per protocol.

## 2023-05-26 NOTE — PROGRESS NOTES
05/22/23 0008   NIV Type   Skin Assessment Clean, dry, & intact   Skin Protection for O2 Device Yes   Location Nose   NIV Started/Stopped On   Equipment Type v60   Mode Bilevel   Mask Type Full face mask   Settings/Measurements   PIP Observed 14 cm H20   IPAP 12 cmH20   CPAP/EPAP 6 cmH2O   Rate Ordered 15   Respirations 18   Insp Rise Time (%) 2 %   FiO2  55 %   I Time/ I Time % 1.05 s   Mask Leak (lpm) 14 lpm   Comfort Level Good   Using Accessory Muscles No   SpO2 94   Patient's Home Machine No   Alarm Settings   Alarms On Y   Low Pressure (cmH2O) 6 cmH2O   High Pressure (cmH2O) 30 cmH2O   Delay Alarm 20 sec(s)   RR Low (bpm) 6   RR High (bpm) 40 br/min   Patient Observation   Observations    Oxygen Therapy/Pulse Ox   Pulse 83   SpO2 93 %
05/24/23 2012   Treatment   Treatment Type HHN;Vibratory mucous clearing therapy or intervention   $Bronchial Hygiene $Oscillatory therapy   $Treatment Type $Inhaled Therapy/Meds   Medications Albuterol   Pre-Tx Pulse 63   Pre-Tx Resps 16   Breath Sounds Pre-Tx KAMINI Clear   Breath Sounds Pre-Tx LLL Diminished   Breath Sounds Pre-Tx RUL Clear   Breath Sounds Pre-Tx RML Clear   Breath Sounds Pre-Tx RLL Diminished   Delivery Source Oxygen;Mask   Position Semi-Millard's   Treatment Tolerance Well   Duration 10   Is patient on O2?  Y   Oxygen Therapy/Pulse Ox   O2 Therapy Oxygen humidified   O2 Device Nasal cannula   O2 Flow Rate (L/min) (S)  4 L/min   PEP Therapy Details   Number of Breaths 15   PEP Device Acapella   Cough/Sputum   Sputum How Obtained Spontaneous cough   Cough Productive;Moist   Sputum Amount Small   Patient Observation   Observations ambu bag at bedside
[id number]    Procedure: CORONARY ARTERY BYPASS GRAFTING TIMES THREE, INTERNAL MAMMARY ARTERY SAPHENOUS VEING GRAFT, ON PUMP WITH LEFT ATRIAL APPENDAGE CLIP PLACEMENT, POSSIBLE 5.5 IMPELLA NOTE:  BILATERAL PECTORALIS BLOCK YAZMIN     Admit from OR (time and date): 5/19/23 1240     Transition (time and date):      Surgery, return to OR: no     Nursing assessment at handoff  stable       Most recent vitals: BP (!) 123/49   Pulse 58   Temp 98.7 °F (37.1 °C) (Bladder)   Resp 15   Ht 5' 1\" (1.549 m)   Wt 168 lb 3.4 oz (76.3 kg)   SpO2 97%   BMI 31.78 kg/m²      Increased O2 requirements: no O2 requirements: Oxygen Therapy  SpO2: 97 %  Pulse Oximetry Type: Continuous  SPO2 High Alarm Limit: 100  SPO2 Low Alarm Limit POX: 90  Pulse Oximeter Device Mode: Continuous  Pulse Oximeter Device Location: Finger  O2 Device: Nasal cannula  Oximetry Probe Site Changed: No  Skin Assessment: Clean, dry, & intact  Skin Protection for O2 Device: Yes  Orientation: Middle  Location: Nose  Intervention(s): Skin Barrier  FiO2 : 55 %  PEEP/CPAP (cm H2O): 5 cm H20  O2 Flow Rate (L/min): 4 L/min  Blood Gas  Performed?: No  Oxygen Therapy: Supplemental oxygen  O2 Delivery Method: CPAP/Bi-PAP mask  Vent Mode: CPAP/PS     Admission weight Weight - Scale: 167 lb 4.8 oz (75.9 kg)  Today's weight   Wt Readings from Last 1 Encounters:   05/24/23 168 lb 3.4 oz (76.3 kg)         EF: <10    Drop in Urinary Output no     Rhythm Changes Normal Sinus Rhythm     Pacing Wires Removed:  [] Yes  [] NO  [] Platelets < 58,715  [] Arrhythmia  [] Bradycardia [] Valve Replacement  [] Pacing for Cardiac Index  []Physician Order    Lines/Drains  LDA Insertion Date Discontinued Date Dressing Changes   Art line 5/19     Central Line 5/19     Carbajal 5/19     Chest Tube 5/19 5/22 & 5/23    Wires  5/19 5/20    ETT 5/19 5//19    JENN Drain      VasCath      Impella        Interventions After Office Hours  Problem(Brief) Date Time Intervention Physician contacted
[id number]    Procedure: CORONARY ARTERY BYPASS GRAFTING TIMES THREE, INTERNAL MAMMARY ARTERY SAPHENOUS VEING GRAFT, ON PUMP WITH LEFT ATRIAL APPENDAGE CLIP PLACEMENT, POSSIBLE 5.5 IMPELLA NOTE:  BILATERAL PECTORALIS BLOCK YAZMIN     Admit from OR (time and date): 5/19/23 1240     Transition (time and date):      Surgery, return to OR: no     Nursing assessment at handoff  stable       Most recent vitals: BP (!) 119/53   Pulse 68   Temp 97.4 °F (36.3 °C) (Oral)   Resp 20   Ht 5' 1\" (1.549 m)   Wt 167 lb 15.9 oz (76.2 kg)   SpO2 96%   BMI 31.74 kg/m²      Increased O2 requirements: no O2 requirements: Oxygen Therapy  SpO2: 96 %  Pulse Oximetry Type: Continuous  SPO2 High Alarm Limit: 100  SPO2 Low Alarm Limit POX: 90  Pulse Oximeter Device Mode: Continuous  Pulse Oximeter Device Location: Finger  O2 Device: Nasal cannula  Oximetry Probe Site Changed: No  Skin Assessment: Clean, dry, & intact  Skin Protection for O2 Device: Yes  Orientation: Middle  Location: Nose  Intervention(s): Skin Barrier  FiO2 : 55 %  PEEP/CPAP (cm H2O): 5 cm H20  O2 Flow Rate (L/min): 4 L/min  Blood Gas  Performed?: No  Oxygen Therapy: Supplemental oxygen  O2 Delivery Method: Nasal cannula  Vent Mode: CPAP/PS     Admission weight Weight - Scale: 167 lb 4.8 oz (75.9 kg)  Today's weight   Wt Readings from Last 1 Encounters:   05/25/23 167 lb 15.9 oz (76.2 kg)         EF: <10    Drop in Urinary Output no     Rhythm Changes Normal Sinus Rhythm     Pacing Wires Removed:  [] Yes  [] NO  [] Platelets < 62,731  [] Arrhythmia  [] Bradycardia [] Valve Replacement  [] Pacing for Cardiac Index  []Physician Order    Lines/Drains  LDA Insertion Date Discontinued Date Dressing Changes   Art line 5/19     Central Line 5/19     Carbajal 5/19     Chest Tube 5/19 5/22 & 5/23    Wires  5/19 5/20    ETT 5/19 5//19    JENN Drain      VasCath      Impella        Interventions After Office Hours  Problem(Brief) Date Time Intervention Physician contacted
[address] - Daily Progress/Follow-up Note      Admit Date:  5/15/2023    CHIEF COMPLAINT  Ischemic cardiomyopathy, CAD s/p CABG    INTERVAL HISTORY:  Ms. Rosmery Stover continues to improve clinically since her CABG last week. She reports fatigue and lethargy but denies any chest pain or shortness of breath. She has no new complaints today. Patient denies chest pain/heaviness/pressure, palpitations, dyspnea, orthopnea, edema, lightheadedness, syncope. REVIEW OF SYSTEMS  10 point ROS done and negative other than above      CARDIAC MEDICATIONS  Amiodarone 400 twice daily  Aspirin 81  Lipitor 40  Plavix 75  Digoxin 125 mcg  Lasix 40 twice daily  Losartan 25  Metoprolol 6.25 daily  Aldactone 25    Family, medical and social history reviewed and updated as necessary.     VITALS  BP (!) 104/43   Pulse 67   Temp 97.8 °F (36.6 °C) (Oral)   Resp 20   Ht 5' 1\" (1.549 m)   Wt 167 lb 15.9 oz (76.2 kg)   SpO2 96%   BMI 31.74 kg/m²     Intake/Output Summary (Last 24 hours) at 5/26/2023 1328  Last data filed at 5/26/2023 1312  Gross per 24 hour   Intake 1310 ml   Output 2100 ml   Net -790 ml       TELEMETRY: No significant arrhythmias noted overnight    General appearance - alert, cooperative, no distress, appears stated age  Head - Normocephalic, without obvious abnormality, atraumatic  Eyes - PERRL, conjunctiva/corneas clear  Nose - Nares normal, no drainage or sinus tenderness  Throat - Lips, mucosa, and tongue normal  Neck - Supple, symmetrical, trachea midline, no adenopathy, thyroid: not enlarged, symmetric, no tenderness/mass/nodules, no carotid bruit or JVD  Lungs - basilar rales bilaterally, respirations unlabored  Chest wall - No tenderness or deformity  Heart - Regular rate and rhythm, S1, S2 normal, no murmur, no rub or gallop  Abdomen - Soft, non-tender, bowel sounds active all four quadrants,  no masses, no organomegaly  Extremities - Extremities normal, atraumatic, no cyanosis or edema  Pulses
CVTS Cardiothoracic Progress Note:                                CC:  Post op follow up     Surgery: 5/19 CORONARY ARTERY BYPASS GRAFTING TIMES THREE, INTERNAL MAMMARY ARTERY SAPHENOUS VEING GRAFT, ON PUMP WITH LEFT ATRIAL APPENDAGE CLIP PLACEMENT,  BILATERAL PECTORALIS BLOCK YAZMIN    Hospital course:  5/20 Pt extubated around [address] yesterday. No rhythm issues overnight. Hemodynamically stable with index of 2.4 on milrinone. Low dose levo.   5/21 Started on dobutamine yesterday morning, milrinone weaned off. Levo weaned off. Increasing oxygen requirements in the evening. Lasix gtt increased and started on BiPAP.   5/22 Lasix gtt decreased. Dobutamine @ 3mcg continued. 8L HiFlow O2 this AM. Pleural CT removed.  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 ([address]     per echo 5/15/2023    Hypertension     PONV (postoperative nausea and vomiting)         Past Surgical History:   Procedure Laterality Date    CORONARY ANGIOPLASTY WITH STENT PLACEMENT      5 stents from [id number]    CORONARY ARTERY BYPASS GRAFT N/A 5/19/2023    CORONARY ARTERY BYPASS GRAFTING TIMES THREE, INTERNAL MAMMARY ARTERY SAPHENOUS VEING GRAFT, ON PUMP WITH LEFT ATRIAL APPENDAGE CLIP PLACEMENT, POSSIBLE 5.5 IMPELLA NOTE:  BILATERAL PECTORALIS BLOCK YAZMIN performed by Sheldon Henriquez MD at [address] [facility] Left 07/01/2016    cataract    EYE SURGERY Right 07/22/2016    cataract    FOOT AMPUTATION Left 12/23/2020    PARTIAL FIFTH DIGIT AMPUTATION LEFT FOOT performed by Arturo Simeon DPM at [address] [facility] Bilateral     FOOT SURGERY Left     HAND SURGERY Left     Trigger finger    HYSTERECTOMY (CERVIX STATUS UNKNOWN)          Allergies as of 05/15/2023 - Fully Reviewed 05/15/2023   Allergen Reaction Noted    Ibuprofen Nausea Only 12/23/2020    Niacin and related Other (See Comments)
CVTS Cardiothoracic Progress Note:                                CC:  Post op follow up     Surgery: 5/19 CORONARY ARTERY BYPASS GRAFTING TIMES THREE, INTERNAL MAMMARY ARTERY SAPHENOUS VEING GRAFT, ON PUMP WITH LEFT ATRIAL APPENDAGE CLIP PLACEMENT,  BILATERAL PECTORALIS BLOCK YAZMIN    Hospital course:  5/20 Pt extubated around [address] yesterday. No rhythm issues overnight. Hemodynamically stable with index of 2.4 on milrinone. Low dose levo.   5/21 Started on dobutamine yesterday morning, milrinone weaned off. Levo weaned off. Increasing oxygen requirements in the evening. Lasix gtt increased and started on BiPAP.   5/22 Lasix gtt decreased. Dobutamine @ 3mcg continued. 8L HiFlow O2 this AM. Pleural CT removed. C/O increased BLE tingling R>L  5/23 Transitioned to intermittent IVP Lasix. Dobutamine D/C. O2  requirements decreased this AM on 4L NC.      Past Medical History:   Diagnosis Date    CAD (coronary artery disease) [id number]    5 cardiac stents    Combined congestive systolic and diastolic heart failure ([address]     per echo 5/15/2023    Hypertension     PONV (postoperative nausea and vomiting)         Past Surgical History:   Procedure Laterality Date    CORONARY ANGIOPLASTY WITH STENT PLACEMENT      5 stents from [id number]    CORONARY ARTERY BYPASS GRAFT N/A 5/19/2023    CORONARY ARTERY BYPASS GRAFTING TIMES THREE, INTERNAL MAMMARY ARTERY SAPHENOUS VEING GRAFT, ON PUMP WITH LEFT ATRIAL APPENDAGE CLIP PLACEMENT, POSSIBLE 5.5 IMPELLA NOTE:  BILATERAL PECTORALIS BLOCK YAZMIN performed by Sung Sotomayor MD at [address] [facility] Left 07/01/2016    cataract    EYE SURGERY Right 07/22/2016    cataract    FOOT AMPUTATION Left 12/23/2020    PARTIAL FIFTH DIGIT AMPUTATION LEFT FOOT performed by Sahil Bermudez DPM at [address] [facility] Bilateral     FOOT SURGERY Left     HAND SURGERY Left     Trigger finger    HYSTERECTOMY (CERVIX STATUS UNKNOWN)          Allergies as of 05/15/2023 - Fully Reviewed 05/15/2023
CVTS Cardiothoracic Progress Note:                                CC:  Post op follow up     Surgery: 5/19 CORONARY ARTERY BYPASS GRAFTING TIMES THREE, INTERNAL MAMMARY ARTERY SAPHENOUS VEING GRAFT, ON PUMP WITH LEFT ATRIAL APPENDAGE CLIP PLACEMENT,  BILATERAL PECTORALIS BLOCK YAZMIN    Hospital course:  5/20 Pt extubated around [address] yesterday. No rhythm issues overnight. Hemodynamically stable with index of 2.4 on milrinone. Low dose levo.   5/21 Started on dobutamine yesterday morning, milrinone weaned off. Levo weaned off. Increasing oxygen requirements in the evening. Lasix gtt increased and started on BiPAP.   5/22 Lasix gtt decreased. Dobutamine @ 3mcg continued. 8L HiFlow O2 this AM. Pleural CT removed. C/O increased BLE tingling R>L  5/23 Transitioned to intermittent IVP Lasix. Dobutamine D/C. O2  requirements decreased this AM on 4L NC.   5/24 AM CXR w/ worsening pulmonary edema/atelectasis. Intermittent Lasix increased to 40mg IV TID. Spironolactone added. Poor IS effort. Repeat ECHO today to eval EF and need for lifevest if EF <30%. [place] and gay d/c . O2 requirements stable.      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 Left 07/01/2016    cataract    EYE SURGERY Right 07/22/2016    cataract    FOOT AMPUTATION Left 12/23/2020    PARTIAL FIFTH DIGIT AMPUTATION LEFT
CVTS Cardiothoracic Progress Note:                                CC:  Post op follow up     Surgery: 5/19 CORONARY ARTERY BYPASS GRAFTING TIMES THREE, INTERNAL MAMMARY ARTERY SAPHENOUS VEING GRAFT, ON PUMP WITH LEFT ATRIAL APPENDAGE CLIP PLACEMENT,  BILATERAL PECTORALIS BLOCK YAZMIN    Hospital course:  5/20 Pt extubated around [address] yesterday. No rhythm issues overnight. Hemodynamically stable with index of 2.4 on milrinone. Low dose levo.   5/21 Started on dobutamine yesterday morning, milrinone weaned off. Levo weaned off. Increasing oxygen requirements in the evening. Lasix gtt increased and started on BiPAP.   5/22 Lasix gtt decreased. Dobutamine @ 3mcg continued. 8L HiFlow O2 this AM. Pleural CT removed. C/O increased BLE tingling R>L  5/23 Transitioned to intermittent IVP Lasix. Dobutamine D/C. O2  requirements decreased this AM on 4L NC.   5/24 AM CXR w/ worsening pulmonary edema/atelectasis. Intermittent Lasix increased to 40mg IV TID. Spironolactone added. Poor IS effort. Repeat ECHO today to eval EF and need for lifevest if EF <30%. [place] and gay d/c . O2 requirements stable. 5/25 AM CXR improved. Intermittent Lasix decreased to BID. Transition to PO tomorrow. Remains on 4 L NC. Improved WOB.     Past Medical History:   Diagnosis Date    CAD (coronary artery disease) [id number]    5 cardiac stents    Combined congestive systolic and diastolic heart failure ([address]     per echo 5/15/2023    Hypertension     PONV (postoperative nausea and vomiting)         Past Surgical History:   Procedure Laterality Date    CORONARY ANGIOPLASTY WITH STENT PLACEMENT      5 stents from [id number]    CORONARY ARTERY BYPASS GRAFT N/A 5/19/2023    CORONARY ARTERY BYPASS GRAFTING TIMES THREE, INTERNAL MAMMARY ARTERY SAPHENOUS VEING GRAFT, ON PUMP WITH LEFT ATRIAL APPENDAGE CLIP PLACEMENT, POSSIBLE 5.5 IMPELLA NOTE:  BILATERAL PECTORALIS BLOCK YAZMIN performed by Oralee Gowers, MD at [address] [facility] Left 07/01/2016    cataract
Care rounds completed w/ CTS. See new orders.      Electronically signed by Martha Shirley RN on 5/23/2023 at 10:19 AM
Chest tubes meet criteria to remove per open heart protocol. No air leak. No crepitus. Pt instructed on procedure. Pt premedicated with Oxycodone. Site cleansed and prepped with chloroprep. Incision site within normal limits. No redness; drainage or signs of infection. Chest tubes X 1 removed without difficulty. Dry sterile dressing and vaseline gauze applied. Bilateral breath sounds heard. O2Sats 93% on 8L nasal cannula. Patient tolerated well.
Comprehensive Nutrition Assessment    Type and Reason for Visit:  Reassess    Nutrition Recommendations/Plan:   Continue heart healthy 2 g Na diet  Continue ensure TID  Monitor nutrition adequacy, pertinent labs, bowel habits, wt changes, and clinical progress     Malnutrition Assessment:  Malnutrition Status: At risk for malnutrition (Comment) (05/20/23 1700)      Nutrition Assessment:    Follow up: Pt remains on heart healthy 2 g Na diet +ensure TID with 1-50% PO intakes per EMR. Pt reports having an appetite but having nausea. Reports eating a little less than half of meals and drinking all ensures. Provided heart healthy diet education and reviewed general guidelines. Pt reports often cooking meals at home and being agreeable to lean meat options like fish and poultry. Provided pt with handout, pt verbalized understanding. Wt appears stable per EMR. Will continue to monitor. Nutrition Related Findings:    BM 5/24. +1 BLE edema. Labs reviewed. Wound Type: Surgical Incision       Current Nutrition Intake & Therapies:    Average Meal Intake: 1-25%, 26-50%  Average Supplements Intake: %  ADULT DIET; Regular; Low Fat/Low Chol/High Fiber/2 gm Na  ADULT ORAL NUTRITION SUPPLEMENT; Breakfast, Lunch, Dinner; Standard High Calorie/High Protein Oral Supplement    Anthropometric Measures:  Height: 5' 1\" (154.9 cm)  Ideal Body Weight (IBW): 105 lbs (48 kg)       Current Body Weight: 167 lb 15.9 oz (76.2 kg),   IBW. Weight Source: Standing Scale  Current BMI (kg/m2): 31.8  Usual Body Weight: 165 lb 12.6 oz (75.2 kg) (5/17)  % Weight Change (Calculated): 1.3  Weight Adjustment For: No Adjustment                 BMI Categories: Obese Class 1 (BMI 30.0-34. 9)    Estimated Daily Nutrient Needs:  Energy Requirements Based On: Kcal/kg  Weight Used for Energy Requirements: Ideal  Energy (kcal/day): 0324-8882  Weight Used for Protein Requirements: Ideal  Protein (g/day): 58-72  Method Used for Fluid Requirements: 1
Comprehensive Nutrition Assessment    Type and Reason for Visit:  Reassess    Nutrition Recommendations/Plan:   Continue heart healthy 2 g Na diet  Continue ensure TID  Provide diet education as able  Monitor nutrition adequacy, pertinent labs, bowel habits, wt changes, and clinical progress     Malnutrition Assessment:  Malnutrition Status: At risk for malnutrition (Comment) (05/20/23 1700)      Nutrition Assessment:    Follow up: Pt on regular heart healthy 2 g Na diet + ensure TID with % PO intakes per EMR. Pt with PT/OT at time of visit. Per nurse, pt has had low PO intake that improved at lunch today, however pt did not order dinner d/t not feeling hungry. Nurse reports pt had 1 ONS today. Mild weight loss noted since admit per EMR. RD to send ONS at dinner. Encourage PO intakes as able. Will monitor for diet education. Nutrition Related Findings:    x5 BM 5/23, +diarrhea. Mg 2.5. Wound Type: Surgical Incision       Current Nutrition Intake & Therapies:    Average Meal Intake: 26-50%, %  Average Supplements Intake: % (x1 per nurse)  ADULT DIET; Regular; Low Fat/Low Chol/High Fiber/2 gm Na  ADULT ORAL NUTRITION SUPPLEMENT; Breakfast, Lunch, Dinner; Standard High Calorie/High Protein Oral Supplement    Anthropometric Measures:  Height: 5' 1\" (154.9 cm)  Ideal Body Weight (IBW): 105 lbs (48 kg)       Current Body Weight: 168 lb 3.4 oz (76.3 kg),   IBW. Weight Source: Standing Scale  Current BMI (kg/m2): 31.8  Usual Body Weight: 171 lb 1.2 oz (77.6 kg) (5/15)  % Weight Change (Calculated): -1.7                    BMI Categories: Obese Class 1 (BMI 30.0-34. 9)    Estimated Daily Nutrient Needs:  Energy Requirements Based On: Kcal/kg  Weight Used for Energy Requirements: Ideal  Energy (kcal/day): 7837-1426  Weight Used for Protein Requirements: Ideal  Protein (g/day): 58-72  Method Used for Fluid Requirements: 1 ml/kcal  Fluid (ml/day): 1752-5917    Nutrition Diagnosis:   Inadequate
Image guided Thoracentesis completed by Dr. Murguia Cools  600 ML  of dark red colored withdrawn. Specimen sent to lab. Pt tolerated procedure without any signs or symptoms of distress. Vital signs stable. DISCHARGED:  ADMITTED: Pt transferred back to room 239. Report called to nurse.        Vital Signs  Vitals:    05/26/23 1427   BP: (!) 92/39   Pulse: 64   Resp:    Temp:    SpO2: 98%
Notified Nichole Mccullough NP of pts chest tube output. OK to remove.
Notified Osmel Torres NP of pt complaining of decreased sensation in her right foot. Doppler pulse present. Order for vascular to see patient again.
Notified Shruthi Kent NP of patients Bps in [de-identified].  Order for albumin,
Occupational Therapy    OT treatment attempted this morning. Per RN, pt is leaving floor for a scan soon. Will attempt treatment at a later time as schedule permits.      Ziggy Gomez, OTR/L
Occupational Therapy  Facility/Department: [facility] C2 CARD TELEMETRY  Occupational Therapy Initial Assessment    Name: Varghese Brewer  : 1955  MRN: [id number]  Date of Service: 2023    Discharge Recommendations:  IP Rehab      Varghese Brewer scored a 12/24 on the AM-PAC ADL Inpatient form. Current research shows that an AM-PAC score of 17 or less is typically not associated with a discharge to the patient's home setting. Based on the patient's AM-PAC score and their current ADL deficits, it is recommended that the patient have 5-7 sessions per week of Occupational Therapy at d/c to increase the patient's independence. At this time, this patient demonstrates complex nursing, medical, and rehabilitative needs, and would benefit from intensive rehabilitation services upon discharge from the Inpatient setting. This patient demonstrates the ability to participate in and benefit from an intensive therapy program with a coordinated interdisciplinary team approach to foster frequent, structured, and documented communication among disciplines, who will work together to establish, prioritize, and achieve treatment goals. Please see assessment section for further patient specific details. If patient discharges prior to next session this note will serve as a discharge summary. Please see below for the latest assessment towards goals. Patient Diagnosis(es): The encounter diagnosis was Swelling of both lower extremities. Past Medical History:  has a past medical history of CAD (coronary artery disease), Combined congestive systolic and diastolic heart failure ([address] Hypertension, and PONV (postoperative nausea and vomiting). Past Surgical History:  has a past surgical history that includes Foot surgery (Bilateral); Hand surgery (Left); Hysterectomy; Coronary angioplasty with stent; eye surgery (Left, 2016); eye surgery (Right, 2016); Foot surgery (Left);  Foot Amputation (Left, 2020);
Occupational Therapy  Facility/Department: [facility] C2 CARD TELEMETRY  Daily Treatment Note  NAME: Jose Thapa  : 1955  MRN: [id number]    Date of Service: 2023    Discharge Recommendations:  24 hour supervision or assist, Home with Home health OT  OT Equipment Recommendations  Equipment Needed: Yes  Mobility Devices: ADL Assistive Devices  ADL Assistive Devices: Transfer Tub Bench    AM-PAC score  AM-PAC Inpatient Daily Activity Raw Score: 18 (23 124)  AM-PAC Inpatient ADL T-Scale Score : 38.66 (23)  ADL Inpatient CMS 0-100% Score: 46.65 (23)  ADL Inpatient CMS G-Code Modifier : CK (23)     Patient Diagnosis(es): The encounter diagnosis was Swelling of both lower extremities. Assessment    Assessment: Pt tolerated treatment well this date. Pt max A x 1 for bed mobility while maintaining sternal precautions and grossly CGA for functional mobility and ADLs. Pt sat EoB for 5 min. Pt placed on 6L O2 for functional mobility around room per RN nd returned down to 4L at end of session with O2 >90%. Pt with productive cough throughout session. Pt continues to have decreased activity tolerance, ind with ADLs, and ind with functional mobility. Pt will continue to benefit from skilled OT services while in acute care. Cont OT POC. Recommending pt discharge home with 24 hr assist and HHOT. Activity Tolerance: Patient tolerated treatment well  Discharge Recommendations: 24 hour supervision or assist;Home with Home health OT  Equipment Needed: Yes  Mobility Devices: ADL Assistive Devices      Plan   Occupational Therapy Plan  Times Per Week: 3-5x/wk     Restrictions  Restrictions/Precautions  Restrictions/Precautions: Fall Risk;General Precautions;Surgical Protocols;ROM Restrictions  Position Activity Restriction  Sternal Precautions: No Pushing; No Pulling;5# Lifting Restrictions  Other position/activity restrictions: Progressive ambulation.  POD #1 ambulate in room and
Oxygen documentation:     O2 saturation at REST on ROOM AIR = ___90___%     If saturation is 89% or above please proceed with steps 2 and 3.      O2 saturation with AMBULATION of _200____ feet on ROOM AIR = __85___%   O2 saturation with AMBULATION on __3_____ liter/min = __93____%     DCP notified: _Kim_____
Physical Therapy    Pt currently being transported off the floor for scan this am. Will attempt at a later time/date as pt is appropriate and schedule permits. Thank you.      Kamila Faith PT, DPT
Physical Therapy  Facility/Department: [facility] C2 CARD TELEMETRY  Daily Treatment Note  NAME: Tara Cano  : 1955  MRN: [id number]    Date of Service: 2023    Discharge Recommendations:  IP Rehab   PT Equipment Recommendations  Equipment Needed: No  Other: defer to patient's facility    Patient Diagnosis(es): The encounter diagnosis was Swelling of both lower extremities. Assessment   Assessment: Pt with good progress in therapy this date. Able to complete all transfers and ambulation in room with grossly CGA. Pt performed to stand pivot trnasfers to and from the [facility] and 5 STS from the recliner. Ambulation limited by lines, took 3-4 steps forward/back 6x. Pt with good participation in seated exericses with minimal cues. pt will continue to benefit from skilled PT services. Continue to rec IPR at this time. If pt mobility continue to progress, pt may be safe to return home. Will continue to follow to address current deficits. Equipment Needed: No  Other: defer to patient's facility     Plan    Physcial Therapy Plan  General Plan: 5-7 times per week  Specific Instructions for Next Treatment: progress mobility as tolerated  Current Treatment Recommendations: Strengthening; Functional mobility training;Transfer training; Endurance training;Patient/Caregiver education & training; Safety education & training;Gait training;Home exercise program;Stair training;Equipment evaluation, education, & procurement; Therapeutic activities;ADL/Self-care training;Pain management     Restrictions  Restrictions/Precautions  Restrictions/Precautions: Fall Risk, General Precautions, Surgical Protocols, ROM Restrictions  Position Activity Restriction  Sternal Precautions: No Pushing, No Pulling, 5# Lifting Restrictions  Other position/activity restrictions: Progressive ambulation. POD #1 ambulate in room and halls, POD #2 Ambulate in halls TID , POD #3 & 4 Ambulate in halls TID with increasing time and distance.  Steps with
Physical Therapy  Facility/Department: [facility] C2 CARD TELEMETRY  Daily Treatment Note  NAME: Rosalie Guerrero  : 1955  MRN: [id number]    Date of Service: 2023    Discharge Recommendations:  IP Rehab   PT Equipment Recommendations  Equipment Needed: No  Other: defer to patient's facility    If pt is unable to be seen after this session, please let this note serve as discharge summary. Please see case management note for discharge disposition. Thank you. Barriers to home discharge:   [x] Steps to access home entry or bed/bath: 3 MELODIE   [x] No rail with steps to access home entry or bed/bath   [x] Reported available assist at home upon discharge limited: patient said that she lives with her . She said her  works. Patient Diagnosis(es): The encounter diagnosis was Swelling of both lower extremities. Assessment   Assessment: Pt tolerated treatment session well this date and able to perform bed mobility with max Ax1, but has increased difficulty maintaining sternal precautions and requires extra help for scooting up in bed in supine. Pt performs STS with [id number] with CGA and able to ambulate ~10 ft with 4WW and CGA, but remains limited by SOB and lines. Pt on 3L throughout session and SpO2 around 87% end of session, RN notified as pt was taking several minutes to recover and having difficulties inspiring through the nose, despite cues. Pt would continue to benefit from skilled therapy during LOS. Continue to rec IPR at this time. Activity Tolerance: Patient tolerated treatment well;Patient limited by fatigue  Equipment Needed: No  Other: defer to patient's facility     Plan    Physcial Therapy Plan  General Plan: 5-7 times per week  Therapy Duration: 1 Week  Specific Instructions for Next Treatment: progress mobility as tolerated  Current Treatment Recommendations: Strengthening; Functional mobility training;Transfer training; Endurance training;Patient/Caregiver education & training; Safety
Physical Therapy  Facility/Department: [facility] C2 CARD TELEMETRY  Daily Treatment Note  NAME: Varghese Brewer  : 1955  MRN: [id number]    Date of Service: 2023    Discharge Recommendations:  24 hour supervision or assist, Home with Home health PT   PT Equipment Recommendations  Equipment Needed: Yes  Mobility Devices: Parrish Skeans: Rollator (4 Wheeled)  Other: Due to increased SOB and decreased endurance with ambulation post-op and with possible new O2 needs pt will benefit from rollator at home. Patient Diagnosis(es): The encounter diagnosis was Swelling of both lower extremities. Assessment   Assessment: Pt tolerated treatment session well this date. Pt performs STS with 4WW with CGA and able to ambulate in do ~150+150ft with 4WW and CGA on 4L of O2. Seated rest break after first bout of ambulation prior to stair training and seated rest break after stair training. Able to navigate up<>down 4 steps with CGA and BHR for balance. Back in room pt reports mild SOB, SpO2 87% on 4L and quickley recovered to 90% after ~5 sec. RN notified. Pt would continue to benefit from skilled therapy during LOS. Per pt and family she will have 24/7 available and is able to sleep in recliner or adjustable bed. It is anticipated pt will be safe to DC home with 24/7 assist and HHPT, will need RW. Activity Tolerance: Patient tolerated treatment well  Equipment Needed: Yes  Other: Due to increased SOB and decreased endurance with ambulation post-op and with possible new O2 needs pt will benefit from rollator at home. Plan    Physcial Therapy Plan  General Plan: 5-7 times per week  Specific Instructions for Next Treatment: progress mobility as tolerated  Current Treatment Recommendations: Strengthening; Functional mobility training;Transfer training; Endurance training;Patient/Caregiver education & training; Safety education & training;Gait training;Home exercise program;Stair training;Equipment evaluation,
Physician Progress Note      Margarita Peterson  CSN #:                  [id number]  :                       1955  ADMIT DATE:       5/15/2023 8:57 PM  [address] [place] DATE:  RESPONDING  PROVIDER #:        Ana Gao          QUERY TEXT:    Pt admitted with CAD required CABG. Pt noted to have been extubated  at   [place] [address] and required Bipap and escalating O2 needs to 13L HFNC post op. If   possible, please document in the progress notes and discharge summary if you   are evaluating and/or treating any of the following: The medical record reflects the following:  Risk Factors: CABG surgery, COPD, pulmonary edema, atelectasis  Clinical Indicators:  requiring Bipap at HS and prn, highest O2 need of 13L HF   NC on  noted on VS flow sheet  per CT surgery  note-\" AM CXR with expected post operative changes,   atelectasis and pulmonary edema- HFNC 8L this AM.  BiPAP PRN and QHS\"  Treatment: O2 HFNC/Bipap, CXR, lasix, expansion pulmonary measures, mobility,   labs, supportive care in ICU setting post op    Thank you, Indy Arizmendi RN BSN  Options provided:  -- Acute respiratory failure with hypoxia d/t hx COPD, atelectasis and acute   pulmonary edema  -- Acute post op pulmonary insufficiency  -- Other - I will add my own diagnosis  -- Disagree - Not applicable / Not valid  -- Disagree - Clinically unable to determine / Unknown  -- Refer to Clinical Documentation Reviewer    PROVIDER RESPONSE TEXT:    This patient is in acute respiratory failure with hypoxia d/t hx COPD,   atelectasis and acute pulmonary edema. Query created by: Tawanna Osorio on 2023 12:08 PM      QUERY TEXT:    Pt admitted with CAD required CABG. Noted documentation of  NSTEMI per   internal medicine attending on  prior to surgery. High sensitivity trop   trend peak 19.  If possible, please document in progress notes and discharge   summary:    The medical record reflects the following:  Risk Factors: CAD,
Procedure: CORONARY ARTERY BYPASS GRAFTING TIMES THREE, INTERNAL MAMMARY ARTERY SAPHENOUS VEING GRAFT, ON PUMP WITH LEFT ATRIAL APPENDAGE CLIP PLACEMENT, POSSIBLE 5.5 IMPELLA NOTE:  BILATERAL PECTORALIS BLOCK YAZMIN     Admit from OR (time and date): 5/19/23 1240     Transition (time and date):      Surgery, return to OR: no     Nursing assessment at handoff  stable       Most recent vitals: /62   Pulse 79   Temp 97.9 °F (36.6 °C) (Axillary)   Resp 24   Ht 5' 1\" (1.549 m)   Wt 169 lb 8.5 oz (76.9 kg)   SpO2 94%   BMI 32.03 kg/m²      Increased O2 requirements: no O2 requirements: Oxygen Therapy  SpO2: 94 %  Pulse Oximetry Type: Continuous  SPO2 High Alarm Limit: 100  SPO2 Low Alarm Limit POX: 90  Pulse Oximeter Device Mode: Continuous  Pulse Oximeter Device Location: Finger  O2 Device: Nasal cannula  Oximetry Probe Site Changed: No  Skin Assessment: Clean, dry, & intact  Skin Protection for O2 Device: Yes  Orientation: Middle  Location: Nose  Intervention(s): Skin Barrier  FiO2 : 55 %  PEEP/CPAP (cm H2O): 5 cm H20  O2 Flow Rate (L/min): 4 L/min  Blood Gas  Performed?: No  Oxygen Therapy: Supplemental oxygen  O2 Delivery Method: CPAP/Bi-PAP mask  Vent Mode: CPAP/PS     Admission weight Weight - Scale: 167 lb 4.8 oz (75.9 kg)  Today's weight   Wt Readings from Last 1 Encounters:   05/23/23 169 lb 8.5 oz (76.9 kg)         EF: <10    Drop in Urinary Output no     Rhythm Changes Normal Sinus Rhythm     Pacing Wires Removed:  [] Yes  [] NO  [] Platelets < 17,056  [] Arrhythmia  [] Bradycardia [] Valve Replacement  [] Pacing for Cardiac Index  []Physician Order    Lines/Drains  LDA Insertion Date Discontinued Date Dressing Changes   Art line 5/19     Central Line 5/19     Carbajal 5/19     Chest Tube 5/19 5/22 & 5/23    Wires  5/19 5/20    ETT 5/19 5//19    JENN Drain      VasCath      Impella        Interventions After Office Hours  Problem(Brief) Date Time Intervention Physician contacted
Procedure: CORONARY ARTERY BYPASS GRAFTING TIMES THREE, INTERNAL MAMMARY ARTERY SAPHENOUS VEING GRAFT, ON PUMP WITH LEFT ATRIAL APPENDAGE CLIP PLACEMENT, POSSIBLE 5.5 IMPELLA NOTE:  BILATERAL PECTORALIS BLOCK YAZMIN     Admit from OR (time and date): 5/19/23 1240     Transition (time and date):      Surgery, return to OR: no     Nursing assessment at handoff  stable       Most recent vitals: BP (!) 145/58   Pulse 81   Temp 99.1 °F (37.3 °C) (Bladder)   Resp 19   Ht 5' 1\" (1.549 m)   Wt 169 lb 8.5 oz (76.9 kg)   SpO2 97%   BMI 32.03 kg/m²      Increased O2 requirements: no O2 requirements: Oxygen Therapy  SpO2: 97 %  Pulse Oximetry Type: Continuous  SPO2 High Alarm Limit: 100  SPO2 Low Alarm Limit POX: 90  Pulse Oximeter Device Mode: Continuous  Pulse Oximeter Device Location: Finger  O2 Device: Nasal cannula  Oximetry Probe Site Changed: No  Skin Assessment: Clean, dry, & intact  Skin Protection for O2 Device: Yes  Orientation: Middle  Location: Nose  Intervention(s): Skin Barrier  FiO2 : 55 %  PEEP/CPAP (cm H2O): 5 cm H20  O2 Flow Rate (L/min): 5 L/min  Blood Gas  Performed?: No  Oxygen Therapy: Supplemental oxygen  O2 Delivery Method: CPAP/Bi-PAP mask  Vent Mode: CPAP/PS     Admission weight Weight - Scale: 167 lb 4.8 oz (75.9 kg)  Today's weight   Wt Readings from Last 1 Encounters:   05/23/23 169 lb 8.5 oz (76.9 kg)         EF: <10    Drop in Urinary Output no     Rhythm Changes Normal Sinus Rhythm     Pacing Wires Removed:  [] Yes  [] NO  [] Platelets < 31,814  [] Arrhythmia  [] Bradycardia [] Valve Replacement  [] Pacing for Cardiac Index  []Physician Order    Lines/Drains  LDA Insertion Date Discontinued Date Dressing Changes   Art line 5/19     Central Line 5/19     Carbajal 5/19     Chest Tube 5/19 5/22 & 5/23    Wires  5/19 5/20    ETT 5/19 5//19    JENN Drain      VasCath      Impella        Interventions After Office Hours  Problem(Brief) Date Time Intervention Physician contacted
Pt arrived for image guided Thoracentesis. Procedure explained including the risk and benefits of the procedure. All questions answered. Pt verbalizes understanding of the procedure and states no more questions. Consent confirmed. Vital signs stable. Labs, allergies, medications, and code status reviewed. No contraindications noted. Time out completed prior to procedure.     Vital Signs  Vitals:    05/26/23 1358   BP: (!) 104/43   Pulse: 66   Resp: 25   Temp:    SpO2: 99%           Allergies  Ibuprofen, Niacin and related, Statins, and Zetia [ezetimibe] (allergies)    Labs  Lab Results   Component Value Date    INR 1.21 (H) 05/21/2023    PROTIME 15.3 (H) 05/21/2023     Lab Results   Component Value Date    CREATININE 0.8 05/26/2023    BUN 40 (H) 05/26/2023     05/26/2023    K 4.7 05/26/2023    CL 94 (L) 05/26/2023    CO2 37 (H) 05/26/2023     Lab Results   Component Value Date    WBC 11.7 (H) 05/26/2023    HGB 10.1 (L) 05/26/2023    HCT 31.2 (L) 05/26/2023    MCV 86.8 05/26/2023     05/26/2023
Pt went to CT scan, sats dropped to 74 during scan on 2L. 02 increased and pt recovered. Also feels nauseous.  Pt now back to bed, Sats were 88, increased 02 to 3L
RADIOLOGY:  Patient status post CT-guided left sided thoracentesis. Patient tolerated the procedure well. Full report to follow.
Shift: 1900--0700 (05/20232)     Procedure: CORONARY ARTERY BYPASS GRAFTING TIMES THREE, INTERNAL MAMMARY ARTERY SAPHENOUS VEING GRAFT, ON PUMP WITH LEFT ATRIAL APPENDAGE CLIP PLACEMENT, POSSIBLE 5.5 IMPELLA NOTE:  BILATERAL PECTORALIS BLOCK YAZMIN     Admit from OR (time and date): 5/19/23 1240     Transition (time and date):      Surgery, return to OR: no     Nursing assessment at handoff  stable    Most recent vitals: BP (!) 97/47   Pulse 86   Temp 98.8 °F (37.1 °C) (Bladder)   Resp 16   Ht 5' 1\" (1.549 m)   Wt 167 lb 15.9 oz (76.2 kg)   SpO2 95%   BMI 31.74 kg/m²      Increased O2 requirements: no O2 requirements: Oxygen Therapy  SpO2: 95 %  Pulse Oximetry Type: Continuous  SPO2 High Alarm Limit: 100  SPO2 Low Alarm Limit POX: 90  Pulse Oximeter Device Mode: Continuous  Pulse Oximeter Device Location: Finger  O2 Device: High flow nasal cannula  Oximetry Probe Site Changed: No  Skin Assessment: Clean, dry, & intact  Skin Protection for O2 Device: Yes  Orientation: Middle  Location: Nose  Intervention(s): Skin Barrier  FiO2 : 55 %  PEEP/CPAP (cm H2O): 5 cm H20  O2 Flow Rate (L/min): 12 L/min  Blood Gas  Performed?: No  Oxygen Therapy: Supplemental oxygen  O2 Delivery Method: CPAP/Bi-PAP mask  Vent Mode: CPAP/PS     Admission weight Weight - Scale: 167 lb 4.8 oz (75.9 kg)  Today's weight   Wt Readings from Last 1 Encounters:   05/22/23 167 lb 15.9 oz (76.2 kg)         EF: <10    Drop in Urinary Output no     Rhythm Changes Normal Sinus Rhythm     Pacing Wires Removed:  [] Yes  [] NO  [] Platelets < 34,969  [] Arrhythmia  [] Bradycardia [] Valve Replacement  [] Pacing for Cardiac Index  []Physician Order    Lines/Drains  LDA Insertion Date Discontinued Date Dressing Changes   Art line 5/19     Central Line 5/19     Carbajal 5/19     Chest Tube 5/19     Wires  5/19     ETT 5/19 5/19    JENN Drain      VasCath      Impella        Interventions After Office Hours  Problem(Brief) Date Time Intervention Physician
Vascular    C/o iintermittent numbness in toes  No other new complaints    AF  Right foot warm with mild distal digit cyanosis. Digit cyanosis noted bilaterally  Biphasic right DP. No PT  Left DP and PT present  Motor intact    Labs reviewed    I/P:  Stable RLE PVD. No acute ischemia  Ok to continue current medical regimen and will ultimately need RLE angiogram when able  Will peripherally follow      Tl Gonzales M.D., FACS.  5/22/2023  4:55 PM
Vascular    Stable lower extremity disease with no acute ischemia. Patient has office info and will contact us to schedule angiogram and lower extremity interventions after discharge.
Walked from room 239 to front doors to unit (approx 100 feet each way) on room air. Sats dropped to 85%.  Walked again with 3L oxygen stayed at 92-93%
Drip rates at handoff:    sodium chloride 5 mL/hr at 05/20/23 2356    dextrose         Hospital Course:  POD# 0 (nights)  - morphine given for pain  - pt remained on venturi mask  - pt only tolerated bipap for 3 hours  - insulin titrated per protocol  - levo needed for pressure support during night  - pt in chair position in bed     UO= 405  Medistinal CT= 72  Pleural CT= 22     POD #1 DAYS  -up to chair- tolerated well  -weaned from venturi mask to 2L NC  -levophed weaned off after trading milrinone for dobutamine  -drop in urine OP, lasix gtt started     POD #1 night   - at 9;30 pt developed desat on NC OF 2 L, so the O2 rate increased to 5  - Lasix drip increased to 20 mg  - Orders for starting BIPAP is obtained (CO2= 53)  - MORNING CARE AND BATHING IS DONE  - PT is still on BIPAP, pt is in bed-chair position at 5;30 am      UO= 1800 ml  Medistinal CT= 30 ml  Pleural CT= 50 ml         5/22 DAYS:  CTx1 removed. Many bowels movements  O2 decreased to 5L   Lasix gtt decreased to 5 mg/hr from 20mg/hr.   -Albumin for mild hypotension     5/22 Nights  -VSS  -Dobutamine at 3  -Lasix at 5  -[id number]     5/23 Days  -Pain treated x1   -Lasix and Dobut gtt off  -Additional lasix IVP given; Q6 20mg ordered  -3L NC    5/23 Night   -pt tolerated ambulation fairly well in the room to the chair   -this AM's standing weigth 76.3kg   -morning labs completed, no replacement needed at this time   -cont. Oxygen support with low dose nasal cannula  -bathed, dressing changed     5/24 Night  - morning labs completed, no replacement needed at this time  - bathed, incisions open to air    5/25 Days  -Removed RIJ and placed 20g PIV in right hand.  -Attempted to wean NC. Pt currently on 3LNC. Desats to high 80s with ambulation.  O2 was weaned to 2L and SpO2 fell to 85% at rest.   -Pt ambulated well with walker and standby assist.     5/25 Days  -VSS overnight.  - Still requiring Oxygen overnight
Intervention Physician contacted                                               Drip rates at handoff:    DOBUTamine 3 mcg/kg/min (05/21/23 1252)    furosemide (LASIX) 1mg/mL infusion 5 mg/hr (05/22/23 1352)    sodium chloride 5 mL/hr at 05/20/23 2356    dextrose         Hospital Course:  POD# 0 (nights)  - morphine given for pain  - pt remained on venturi mask  - pt only tolerated bipap for 3 hours  - insulin titrated per protocol  - levo needed for pressure support during night  - pt in chair position in bed    UO= 405  Medistinal CT= 72  Pleural CT= 22    POD #1 DAYS  -up to chair- tolerated well  -weaned from venturi mask to 2L NC  -levophed weaned off after trading milrinone for dobutamine  -drop in urine OP, lasix gtt started    POD #1 night   - at 9;30 pt developed desat on NC OF 2 L, so the O2 rate increased to 5  - Lasix drip increased to 20 mg  - Orders for starting BIPAP is obtained (CO2= 53)  - MORNING CARE AND BATHING IS DONE  - PT is still on BIPAP, pt is in bed-chair position at 5;30 am     UO= 1800 ml  Medistinal CT= 30 ml  Pleural CT= 50 ml       5/22 DAYS:  CTx1 removed. Many bowels movements  O2 decreased to 5L   Lasix gtt decreased to 5 mg/hr from 20mg/hr.   -Albumin for mild hypotension      Lab Data:  CBC:   Recent Labs     05/21/23  0530 05/22/23  0515   WBC 16.5* 16.0*   HGB 11.0* 10.1*   HCT 33.9* 31.5*   MCV 87.5 86.9    160       BMP:    Recent Labs     05/21/23  0530 05/21/23  1140 05/22/23  0515 05/22/23  1120 05/22/23  1720     --  134*  --   --    K 4.0   < > 3.8  3.8 3.9 4.8   CO2 27  --  33*  --   --    BUN 15  --  18  --   --    CREATININE 0.8  --  0.7  --   --     < > = values in this interval not displayed. LIVR:   No results for input(s): AST, ALT in the last 72 hours. PT/INR:   Recent Labs     05/20/23  0440 05/21/23  0530   INR 1.10 1.21*       APTT:   No results for input(s): APTT in the last 72 hours.     ABG:   Recent Labs     05/21/23  4620
WBC 10.3 8.7   HGB 9.8* 10.5*   HCT 30.5* 31.7*   MCV 86.2 86.1    314       BMP:    Recent Labs     05/24/23  0424 05/25/23  0518    139   K 4.7 4.3   CO2 35* 36*   BUN 33* 38*   CREATININE 0.7 <0.5*       LIVR: No results for input(s): AST, ALT in the last 72 hours. PT/INR:   No results for input(s): PROT, INR in the last 72 hours. APTT: No results for input(s): APTT in the last 72 hours. ABG:   No results for input(s): PHART, PAT4DPR, PO2ART in the last 72 hours.
belt  Wheelchair Management  Wheelchair Management: No           Safety Devices  Type of Devices: Call light within reach;Nurse notified; Patient at risk for falls;Gait belt; Heels elevated for pressure relief;Left in chair  Restraints  Restraints Initially in Place: No     [facility] 6 Clicks Inpatient Mobility:  AM-PAC Basic Mobility - Inpatient   How much help is needed turning from your back to your side while in a flat bed without using bedrails?: A Little  How much help is needed moving from lying on your back to sitting on the side of a flat bed without using bedrails?: A Lot  How much help is needed moving to and from a bed to a chair?: A Little  How much help is needed standing up from a chair using your arms?: A Little  How much help is needed walking in hospital room?: A Little  How much help is needed climbing 3-5 steps with a railing?: A Little  [facility] : 17  AM-PAC Inpatient T-Scale Score : 42.13  Mobility Inpatient CMS 0-100% Score: 50.57  Mobility Inpatient CMS G-Code Modifier : CK    Goals  Short Term Goals  Time Frame for Short Term Goals: 1 week 5/27/23  Short Term Goal 1: Supine <> sit with minimum assistance. Short Term Goal 2: Sit <> stand and bed <> chair with rolling walker and modified independence. Short Term Goal 3: Ambulate 100 feet with RW and mod I. Short Term Goal 4: Ascend 3 steps with rail and mod I. Short Term Goal 5: By 5/23/23 Patient will tolerate 12-15 reps of his exercises to maximize his strength/endurance -- MET 5/22/23  Patient Goals   Patient Goals : To decrease her nausea and be able to walk again. Education  Patient Education  Education Given To: Patient  Education Provided: Role of Therapy;Plan of Care;Home Exercise Program;Equipment; Fall Prevention Strategies;Transfer Training;Precautions  Education Provided Comments: Pt able to verbalize 2/3 sternal precautions although still needs minimal cues to maintain during transfers  Education Method:
shortness of breath associated with this. States that she had similar pains in the last month and had a recent echocardiogram due to leg swelling and shortness of breath. \"      Predisposing dysphagia risk factors: COPD  Clinical signs of possible chronic dysphagia: reduced PO intake  Precipitating dysphagia risk factors: increased O2 demands and recent intubation    Patient Complaints:  Odynophagia: [] Yes [x] No  Globus Sensation: [] Yes [x] No  SOB with PO intake: [] Yes [] No  Increased WOB with PO intake: [] Yes [x] No  Reflux Sx's: [] Yes [x] No  Weight loss: [] Yes [x] No  Coughing/Choking with PO intake: [] Yes [x] No  Reduced Appetite: [] Yes [x] No    Additional Reported Symptoms/Complaints/Hospital Course: Pt had CABG on 05/19/23, extubated 05/20/23. Pt denies issues with swallowing.  Pt reports she has some nausea after taking pills this AM.    Pt's goals: \"to get better\"    Vitals/labs:   Temp: n/a  SpO2: 95%  RR: 20/min  BP: 117/87  HR: 75  O2 device: 4lpm O2 via HFNC    CBC:   Recent Labs     05/25/23  0518   WBC 8.7   HGB 10.5*         BMP:  Recent Labs     05/25/23  0518      K 4.3   CL 95*   CO2 36*   BUN 38*   CREATININE <0.5*   GLUCOSE 108*          Cranial nerve exam:   CN V (trigeminal): ophthalmic, maxillary, and mandibular facial sensation- WFL  CN VII (facial): WFL  CN IX/X (glossopharyngeal/vagus): MPT: Reduced; pitch range: WFL; vocal quality: WFL; cough: Strong-perceptually  CN XII (hypoglossal): WFL    Laryngeal function exam:   Secretions: WFL  Vocal quality: See CN exam above  MPT: See CN exam above  S/Z ratio: DNT  Pitch range: See CN exam above  Cough: See CN exam above    Oral Care Status:    [x] Oral Care [facility]  [] Poor oral care status  [] Edentulous  [] Upper Dentures  [] Lower Dentures  [] Missing/Broken Teeth  [] Evidence of dental cavities/carries    PO trials:   IDDSI 0 (thin): no anterior bolus loss , suspect functional A-P bolus transit, swallow timing
11.7 this AM.  Afebrile. - Daily CBC  - No concerns at this time. 8. GI:  -  Continue scheduled post-op bowel regimen as tolerated. Hold for frequent loose stool. 9. Nutrition:   - Regular diet. ADAT. 10. Labs:   - Labs and imagine reviewed as above     11. Post-op Drains/Wires: All out      12. D/C plan: CM following. Plan for possible D/C home w/ [facility] and possible home O2 needs later today.     13. Continue post-op care of patient in the ICU    GI prophylaxis: Pepcid  DVT prophylaxis: arixtra   ________________________________________________________________________  VEL Suarez - CNP  5/26/2023  10:00 AM

## 2023-05-26 NOTE — DISCHARGE INSTR - COC
FRFW:[id number]}  Bathing  {CHP DME DLYD:578565875}  Dressing  {CHP DME QQPL:354564372}  Toileting  {CHP DME POQM:296343475}  Feeding  {CHP DME UQJW:693346447}  Med Admin  {CHP DME YYXR:047909007}  Med Delivery   { VÍCTOR MED Delivery:807648799}    Wound Care Documentation and Therapy:  Incision 23 Leg Anterior;Right (Active)   Dressing Status Dry; Intact 23 0800   Incision Cleansed Not Cleansed 23 0800   Dressing/Treatment Ace wrap 23 0400   Closure Surgical glue 23 0800   Margins Approximated 23 0800   Incision Assessment Dry 23 0800   Drainage Amount None 23 0800   Odor None 23 0800   Arianne-incision Assessment Intact 23 0800   Number of days: 7       Incision 23 Chest Anterior (Active)   Dressing Status Clean;Dry; Intact 23 0800   Incision Cleansed Not Cleansed 23 0800   Dressing/Treatment Surgical glue; Open to air 23 0800   Closure Surgical glue 23 0800   Margins Approximated 23 0800   Incision Assessment Dry 23 0800   Drainage Amount None 23 0800   Odor None 23 0800   Arianne-incision Assessment Intact 23 0800   Number of days: 7       Incision 20 Toe (Comment  which one) Anterior;Left;Distal (Active)   Number of days: 884        Elimination:  Continence: Bowel: {YES / ELAN:73536}  Bladder: {YES / XO:04287}  Urinary Catheter: {Urinary Catheter:923502644}   Colostomy/Ileostomy/Ileal Conduit: {YES / S}       Date of Last BM: ***    Intake/Output Summary (Last 24 hours) at 2023 1157  Last data filed at 2023 0939  Gross per 24 hour   Intake 1310 ml   Output 1800 ml   Net -490 ml     I/O last 3 completed shifts:   In: 7023 [P.O.:1070]  Out: 1500 [Urine:1500]    Safety Concerns:     [address] VÍCTOR Safety Concerns:959070962}    Impairments/Disabilities:      [address] RENEE Impairments/Disabilities:490840954}    Nutrition Therapy:  Current Nutrition Therapy:   [address] RENEE Diet List:799037131}    Routes of

## 2023-05-30 ENCOUNTER — TELEPHONE (OUTPATIENT)
Dept: INTERNAL MEDICINE CLINIC | Age: 68
End: 2023-05-30

## 2023-06-01 ENCOUNTER — TELEPHONE (OUTPATIENT)
Dept: VASCULAR SURGERY | Age: 68
End: 2023-06-01

## 2023-06-01 NOTE — TELEPHONE ENCOUNTER
Called patient regarding scheduling aortogram per Dr Andres North. Scheduled for June 13 at 10:30am and arrive at 8:30am. Npo after midnight.  Zenaida

## 2023-06-09 ENCOUNTER — TELEPHONE (OUTPATIENT)
Dept: VASCULAR SURGERY | Age: 68
End: 2023-06-09

## 2023-06-09 NOTE — TELEPHONE ENCOUNTER
Daughter called regarding her mother having to lay flay after having her angiogram. They are concerned as she has not been able to lay flat since her cardiac surgery. She feels like she cannot breath. She will have to lye flat after her angiogram for couple hours. She will call me back to let me know. adrian

## 2023-06-13 ENCOUNTER — HOSPITAL ENCOUNTER (OUTPATIENT)
Dept: CARDIAC CATH/INVASIVE PROCEDURES | Age: 68
Discharge: HOME | End: 2023-06-13
Attending: SURGERY | Admitting: SURGERY
Payer: MEDICARE

## 2023-06-13 VITALS — WEIGHT: 166 LBS | BODY MASS INDEX: 31.34 KG/M2 | HEIGHT: 61 IN

## 2023-06-13 LAB
ANION GAP SERPL CALCULATED.3IONS-SCNC: 12 MMOL/L (ref 3–16)
BUN SERPL-MCNC: 21 MG/DL (ref 7–20)
CALCIUM SERPL-MCNC: 10.4 MG/DL (ref 8.3–10.6)
CHLORIDE SERPL-SCNC: 97 MMOL/L (ref 99–110)
CO2 SERPL-SCNC: 28 MMOL/L (ref 21–32)
CREAT SERPL-MCNC: 0.8 MG/DL (ref 0.6–1.2)
DEPRECATED RDW RBC AUTO: 17.4 % (ref 12.4–15.4)
GFR SERPLBLD CREATININE-BSD FMLA CKD-EPI: >60 ML/MIN/{1.73_M2}
GLUCOSE SERPL-MCNC: 110 MG/DL (ref 70–99)
HCT VFR BLD AUTO: 36.2 % (ref 36–48)
HGB BLD-MCNC: 11.8 G/DL (ref 12–16)
MCH RBC QN AUTO: 28.4 PG (ref 26–34)
MCHC RBC AUTO-ENTMCNC: 32.7 G/DL (ref 31–36)
MCV RBC AUTO: 86.9 FL (ref 80–100)
PLATELET # BLD AUTO: 246 K/UL (ref 135–450)
PMV BLD AUTO: 7.3 FL (ref 5–10.5)
POTASSIUM SERPL-SCNC: 4.2 MMOL/L (ref 3.5–5.1)
RBC # BLD AUTO: 4.17 M/UL (ref 4–5.2)
SODIUM SERPL-SCNC: 137 MMOL/L (ref 136–145)
WBC # BLD AUTO: 9.2 K/UL (ref 4–11)

## 2023-06-13 PROCEDURE — 75625 CONTRAST EXAM ABDOMINL AORTA: CPT

## 2023-06-13 PROCEDURE — 2709999900 HC NON-CHARGEABLE SUPPLY

## 2023-06-13 PROCEDURE — 85027 COMPLETE CBC AUTOMATED: CPT

## 2023-06-13 PROCEDURE — 75625 CONTRAST EXAM ABDOMINL AORTA: CPT | Performed by: SURGERY

## 2023-06-13 PROCEDURE — 75716 ARTERY X-RAYS ARMS/LEGS: CPT | Performed by: SURGERY

## 2023-06-13 PROCEDURE — 99152 MOD SED SAME PHYS/QHP 5/>YRS: CPT

## 2023-06-13 PROCEDURE — 36200 PLACE CATHETER IN AORTA: CPT | Performed by: SURGERY

## 2023-06-13 PROCEDURE — 6360000004 HC RX CONTRAST MEDICATION

## 2023-06-13 PROCEDURE — 36200 PLACE CATHETER IN AORTA: CPT

## 2023-06-13 PROCEDURE — 75716 ARTERY X-RAYS ARMS/LEGS: CPT

## 2023-06-13 PROCEDURE — 6360000002 HC RX W HCPCS

## 2023-06-13 PROCEDURE — C1894 INTRO/SHEATH, NON-LASER: HCPCS

## 2023-06-13 PROCEDURE — 6370000000 HC RX 637 (ALT 250 FOR IP)

## 2023-06-13 PROCEDURE — 2500000003 HC RX 250 WO HCPCS

## 2023-06-13 PROCEDURE — 76937 US GUIDE VASCULAR ACCESS: CPT | Performed by: SURGERY

## 2023-06-13 PROCEDURE — 80048 BASIC METABOLIC PNL TOTAL CA: CPT

## 2023-06-13 PROCEDURE — 99152 MOD SED SAME PHYS/QHP 5/>YRS: CPT | Performed by: SURGERY

## 2023-06-13 RX ORDER — CLOPIDOGREL BISULFATE 75 MG/1
75 TABLET ORAL ONCE
Status: COMPLETED | OUTPATIENT
Start: 2023-06-13 | End: 2023-06-13

## 2023-06-13 RX ORDER — MIDAZOLAM HYDROCHLORIDE 1 MG/ML
INJECTION INTRAMUSCULAR; INTRAVENOUS
Status: COMPLETED | OUTPATIENT
Start: 2023-06-13 | End: 2023-06-13

## 2023-06-13 RX ORDER — ASPIRIN 81 MG/1
81 TABLET, CHEWABLE ORAL ONCE
Status: COMPLETED | OUTPATIENT
Start: 2023-06-13 | End: 2023-06-13

## 2023-06-13 RX ORDER — SODIUM CHLORIDE 9 MG/ML
INJECTION, SOLUTION INTRAVENOUS CONTINUOUS
Status: DISCONTINUED | OUTPATIENT
Start: 2023-06-13 | End: 2023-06-13 | Stop reason: HOSPADM

## 2023-06-13 RX ORDER — FENTANYL CITRATE 50 UG/ML
INJECTION, SOLUTION INTRAMUSCULAR; INTRAVENOUS
Status: COMPLETED | OUTPATIENT
Start: 2023-06-13 | End: 2023-06-13

## 2023-06-13 RX ADMIN — MIDAZOLAM HYDROCHLORIDE 0.5 MG: 1 INJECTION INTRAMUSCULAR; INTRAVENOUS at 10:51

## 2023-06-13 RX ADMIN — FENTANYL CITRATE 25 MCG: 50 INJECTION, SOLUTION INTRAMUSCULAR; INTRAVENOUS at 10:51

## 2023-06-13 RX ADMIN — CLOPIDOGREL BISULFATE 75 MG: 75 TABLET ORAL at 09:23

## 2023-06-13 RX ADMIN — ASPIRIN 81 MG: 81 TABLET, CHEWABLE ORAL at 09:23

## 2023-06-13 NOTE — OP NOTE
was  prepped and draped in sterile fashion. Ultrasound was used to identify  the left radial artery, which was patent and pulsatile. Under direct  visualization this was accessed and a 5-Iraqi radial slender sheath was  placed. A digital copy of the ultrasound image was saved and placed in  the patient's record. Through the radial sheath a cocktail of dilute  heparin, verapamil and nitroglycerin was flushed. An advantage  Glidewire and pigtail catheter were then advanced through the radial  artery, brachial, subclavian artery into the aortic arch and then  directed into the descending thoracic aorta. The catheter was  ultimately positioned in the supraceliac aorta. Abdominal aortogram was  then performed. The catheter was advanced down to just above the aortic  bifurcation using a bolus evert technique, bilateral lower extremity  angiography was performed. The catheter was then removed over the wire. The radial sheath was then removed over a radial compression band, which  was inflated with air and achieved excellent hemostasis. The patient  was then transferred to the recovery area in a stable condition. Estimated blood loss throughout the procedure was minimal.    ANGIOGRAPHIC FINDINGS:  Abdominal aortogram demonstrates widely patent  celiac, superior mesenteric and bilateral renal arteries without  stenosis. The aorta is without significant stenosis as are bilateral  common internal and external iliac arteries. Bilateral common femoral  and superficial femoral arteries are patent. On the right side, the  above-knee popliteal artery abruptly occludes. There are multiple  collaterals reconstituting the trifurcation vessels, but the below-knee  popliteal artery and tibioperoneal trunk are remained occluded. The  dominant outflow in the right lower extremity is via the anterior tibial  artery although all three tibial vessels are patent.   In the left lower  extremity there is no significant

## 2023-06-13 NOTE — H&P
History and Physical / Pre-Sedation Assessment    Patient:  Dirk Chen  :   1955    Intended Procedure: Aortogram with BLE angiogram      HPI: Bilateral claudication right worse than left  Nurses notes reviewed and agreed. Medications reviewed  Allergies:   Ibuprofen, Niacin and related, Statins, and Zetia [ezetimibe]        Physical Exam:   CURRENT VITALS:  There were no vitals taken for this visit. Airway:Normal  Cardiac:Normal  Pulmonary:Normal  Abdomen:Normal        Pre-Procedure Assessment/Plan:  ASA 3 - Patient with moderate systemic disease with functional limitations    Mallampati Airway Assessment:  Mallampati Class II - (soft palate, fauces & uvula are visible)    Level of Sedation Plan: Moderate sedation    Post Procedure plan: Return to same level of care    I assessed the patient and find that the patient is in satisfactory condition to proceed with the planned procedure and sedation plan. I have explained the risk, benefits, and alternatives to the procedure. The patient understands and agrees to proceed.   Yes    Viviane Rubio

## 2023-06-13 NOTE — DISCHARGE INSTRUCTIONS
Cath Labs at  [facility]   Discharge Instructions        6/13/2023  Tracy Jorgensen   Date of Birth 1955       Activity:  No driving for 24 hours. In 24 hours you may remove dressing and shower, wash site gently with soap and water and leave open to air  Avoid submerging your arm in sitting water for 5 days. Do not use your left hand for 24 hours, then  No lifting more than 5 pounds for 5 days. No lotions, powders, or ointments near site for 5 days. No work/school for 5 days unless instructed otherwise by your cardiologist.    Diet:   Resume previous diet, if a cardiac diet is specified you will receive a handout with  general guidelines. Drink extra non-alcoholic/decaffienated fluids for first 24 hours after your procedure. Arm Management:  If bleeding occurs from the site or a hematoma (lump) begins to increase in size, apply pressure directly over the site, call 911 to return to the hospital.    Special Instructions:  Report any coolness or numbness in the arm  Report any chills, fever, itching, red bumps or rash   Report any of the following to the MD: drainage from the site, redness and/or swelling at the site, increased tenderness at the site   If you are currently taking Metformin or Metformin combination medications for Diabetes, hold your dose for 48 hours after your procedure. Consult your Cardiologist before taking any NSAIDS, vitamin supplements, estrogen, or estrogen plus progestin. Do not stop taking Plavix, Brilinta or Effient, without first consulting your cardiologist.    Sedation Discharge Instructions: For the next 24 hours do not drive a car, operate machinery, power tools or kitchen appliances. Do not drink alcohol; including beer or wine. Do not make any important decisions or sign any important papers. For the next 24 hours you can expect drowsiness, light-headed or dizziness, nausea/ vomiting, inability to concentrate, fatigue and desire to sleep.   We

## 2023-06-14 DIAGNOSIS — I25.10 CORONARY ARTERY DISEASE INVOLVING NATIVE CORONARY ARTERY OF NATIVE HEART WITHOUT ANGINA PECTORIS: Primary | ICD-10-CM

## 2023-06-14 RX ORDER — OXYCODONE HYDROCHLORIDE 5 MG/1
5 TABLET ORAL EVERY 6 HOURS PRN
Qty: 28 TABLET | Refills: 0 | Status: SHIPPED | OUTPATIENT
Start: 2023-06-14 | End: 2023-06-21

## 2023-06-20 ENCOUNTER — OFFICE VISIT (OUTPATIENT)
Dept: CARDIOLOGY CLINIC | Age: 68
End: 2023-06-20
Payer: MEDICARE

## 2023-06-20 ENCOUNTER — PREP FOR PROCEDURE (OUTPATIENT)
Dept: VASCULAR SURGERY | Age: 68
End: 2023-06-20

## 2023-06-20 VITALS
BODY MASS INDEX: 31.34 KG/M2 | SYSTOLIC BLOOD PRESSURE: 118 MMHG | HEIGHT: 61 IN | DIASTOLIC BLOOD PRESSURE: 64 MMHG | OXYGEN SATURATION: 94 % | HEART RATE: 84 BPM | WEIGHT: 166 LBS

## 2023-06-20 DIAGNOSIS — I25.5 ISCHEMIC CARDIOMYOPATHY: ICD-10-CM

## 2023-06-20 DIAGNOSIS — I50.41 ACUTE COMBINED SYSTOLIC AND DIASTOLIC CONGESTIVE HEART FAILURE (HCC): Primary | ICD-10-CM

## 2023-06-20 DIAGNOSIS — I25.10 CORONARY ARTERY DISEASE WITHOUT ANGINA PECTORIS, UNSPECIFIED VESSEL OR LESION TYPE, UNSPECIFIED WHETHER NATIVE OR TRANSPLANTED HEART: ICD-10-CM

## 2023-06-20 PROCEDURE — 1036F TOBACCO NON-USER: CPT | Performed by: INTERNAL MEDICINE

## 2023-06-20 PROCEDURE — 1123F ACP DISCUSS/DSCN MKR DOCD: CPT | Performed by: INTERNAL MEDICINE

## 2023-06-20 PROCEDURE — 1090F PRES/ABSN URINE INCON ASSESS: CPT | Performed by: INTERNAL MEDICINE

## 2023-06-20 PROCEDURE — G8400 PT W/DXA NO RESULTS DOC: HCPCS | Performed by: INTERNAL MEDICINE

## 2023-06-20 PROCEDURE — 99214 OFFICE O/P EST MOD 30 MIN: CPT | Performed by: INTERNAL MEDICINE

## 2023-06-20 PROCEDURE — 3017F COLORECTAL CA SCREEN DOC REV: CPT | Performed by: INTERNAL MEDICINE

## 2023-06-20 PROCEDURE — 1111F DSCHRG MED/CURRENT MED MERGE: CPT | Performed by: INTERNAL MEDICINE

## 2023-06-20 PROCEDURE — G8427 DOCREV CUR MEDS BY ELIG CLIN: HCPCS | Performed by: INTERNAL MEDICINE

## 2023-06-20 PROCEDURE — G8417 CALC BMI ABV UP PARAM F/U: HCPCS | Performed by: INTERNAL MEDICINE

## 2023-06-20 PROCEDURE — 93000 ELECTROCARDIOGRAM COMPLETE: CPT | Performed by: INTERNAL MEDICINE

## 2023-06-20 RX ORDER — SODIUM CHLORIDE 9 MG/ML
INJECTION, SOLUTION INTRAVENOUS PRN
Status: CANCELLED | OUTPATIENT
Start: 2023-06-20

## 2023-06-20 RX ORDER — SODIUM CHLORIDE 0.9 % (FLUSH) 0.9 %
5-40 SYRINGE (ML) INJECTION EVERY 12 HOURS SCHEDULED
Status: CANCELLED | OUTPATIENT
Start: 2023-06-20

## 2023-06-20 RX ORDER — POLYETHYLENE GLYCOL 3350 17 G/17G
17 POWDER, FOR SOLUTION ORAL
COMMUNITY
Start: 2023-05-15 | End: 2023-06-21

## 2023-06-20 RX ORDER — SODIUM CHLORIDE 0.9 % (FLUSH) 0.9 %
5-40 SYRINGE (ML) INJECTION PRN
Status: CANCELLED | OUTPATIENT
Start: 2023-06-20

## 2023-06-20 RX ORDER — ACETAMINOPHEN 500 MG
650 TABLET ORAL EVERY 6 HOURS PRN
COMMUNITY
Start: 2023-05-15

## 2023-06-20 NOTE — PATIENT INSTRUCTIONS
Your provider has ordered testing for further evaluation. An order/prescription has been included in your paper work. To schedule outpatient testing, contact Central Scheduling by calling [facility] ([phone]).     PLAN:  Echocardiogram in August 2023 to assess heart function and strength  Stop amiodarone and digoxin  Continue all other cardiac medications without changes  Follow up with me in September after echo

## 2023-06-21 RX ORDER — DOCUSATE SODIUM 100 MG/1
100 CAPSULE, LIQUID FILLED ORAL DAILY
COMMUNITY

## 2023-06-22 ENCOUNTER — HOSPITAL ENCOUNTER (OUTPATIENT)
Dept: VASCULAR LAB | Age: 68
Discharge: HOME | End: 2023-06-22
Payer: MEDICARE

## 2023-06-22 DIAGNOSIS — Z01.818 PREOP TESTING: ICD-10-CM

## 2023-06-22 DIAGNOSIS — I77.9 PERIPHERAL ARTERIAL OCCLUSIVE DISEASE (HCC): ICD-10-CM

## 2023-06-22 PROCEDURE — 93971 EXTREMITY STUDY: CPT

## 2023-06-23 ENCOUNTER — ANESTHESIA EVENT (OUTPATIENT)
Dept: OPERATING ROOM | Age: 68
DRG: 253 | End: 2023-06-23
Payer: MEDICARE

## 2023-06-23 DIAGNOSIS — I50.40 COMBINED SYSTOLIC AND DIASTOLIC CONGESTIVE HEART FAILURE, UNSPECIFIED HF CHRONICITY (HCC): Primary | ICD-10-CM

## 2023-06-23 DIAGNOSIS — I73.9 CLAUDICATION IN PERIPHERAL VASCULAR DISEASE (HCC): ICD-10-CM

## 2023-06-23 DIAGNOSIS — I25.10 CORONARY ARTERY DISEASE INVOLVING NATIVE HEART WITHOUT ANGINA PECTORIS, UNSPECIFIED VESSEL OR LESION TYPE: ICD-10-CM

## 2023-06-23 RX ORDER — LOSARTAN POTASSIUM 25 MG/1
25 TABLET ORAL DAILY
Qty: 90 TABLET | Refills: 1 | Status: SHIPPED | OUTPATIENT
Start: 2023-06-23

## 2023-06-23 RX ORDER — SPIRONOLACTONE 25 MG/1
25 TABLET ORAL DAILY
Qty: 90 TABLET | Refills: 3 | Status: SHIPPED | OUTPATIENT
Start: 2023-06-23

## 2023-06-23 RX ORDER — LOSARTAN POTASSIUM 25 MG/1
25 TABLET ORAL DAILY
Qty: 90 TABLET | Refills: 3 | Status: SHIPPED | OUTPATIENT
Start: 2023-06-23

## 2023-06-23 RX ORDER — SPIRONOLACTONE 25 MG/1
25 TABLET ORAL DAILY
Qty: 90 TABLET | Refills: 1 | Status: SHIPPED | OUTPATIENT
Start: 2023-06-23

## 2023-06-23 RX ORDER — POTASSIUM CHLORIDE 750 MG/1
10 TABLET, EXTENDED RELEASE ORAL DAILY
Qty: 90 TABLET | Refills: 1 | Status: SHIPPED | OUTPATIENT
Start: 2023-06-23

## 2023-06-23 RX ORDER — ATORVASTATIN CALCIUM 40 MG/1
40 TABLET, FILM COATED ORAL NIGHTLY
Qty: 90 TABLET | Refills: 1 | Status: SHIPPED | OUTPATIENT
Start: 2023-06-23

## 2023-06-23 RX ORDER — ATORVASTATIN CALCIUM 40 MG/1
40 TABLET, FILM COATED ORAL NIGHTLY
Qty: 90 TABLET | Refills: 3 | Status: SHIPPED | OUTPATIENT
Start: 2023-06-23

## 2023-06-26 ENCOUNTER — HOSPITAL ENCOUNTER (INPATIENT)
Age: 68
LOS: 1 days | Discharge: HOME | DRG: 253 | End: 2023-06-27
Attending: SURGERY | Admitting: SURGERY
Payer: MEDICARE

## 2023-06-26 ENCOUNTER — ANESTHESIA (OUTPATIENT)
Dept: OPERATING ROOM | Age: 68
DRG: 253 | End: 2023-06-26
Payer: MEDICARE

## 2023-06-26 DIAGNOSIS — G89.18 POST-OP PAIN: Primary | ICD-10-CM

## 2023-06-26 DIAGNOSIS — I73.9 PVD (PERIPHERAL VASCULAR DISEASE) WITH CLAUDICATION (HCC): ICD-10-CM

## 2023-06-26 LAB
ABO + RH BLD: NORMAL
BLD GP AB SCN SERPL QL: NORMAL

## 2023-06-26 PROCEDURE — 6360000002 HC RX W HCPCS: Performed by: SURGERY

## 2023-06-26 PROCEDURE — 2580000003 HC RX 258: Performed by: SURGERY

## 2023-06-26 PROCEDURE — 2500000003 HC RX 250 WO HCPCS

## 2023-06-26 PROCEDURE — C1894 INTRO/SHEATH, NON-LASER: HCPCS | Performed by: SURGERY

## 2023-06-26 PROCEDURE — 2060000000 HC ICU INTERMEDIATE R&B

## 2023-06-26 PROCEDURE — 2709999900 HC NON-CHARGEABLE SUPPLY: Performed by: SURGERY

## 2023-06-26 PROCEDURE — 7100000001 HC PACU RECOVERY - ADDTL 15 MIN: Performed by: SURGERY

## 2023-06-26 PROCEDURE — 6360000004 HC RX CONTRAST MEDICATION: Performed by: SURGERY

## 2023-06-26 PROCEDURE — 6360000002 HC RX W HCPCS: Performed by: ANESTHESIOLOGY

## 2023-06-26 PROCEDURE — 2580000003 HC RX 258

## 2023-06-26 PROCEDURE — 3600000017 HC SURGERY HYBRID ADDL 15MIN: Performed by: SURGERY

## 2023-06-26 PROCEDURE — 86901 BLOOD TYPING SEROLOGIC RH(D): CPT

## 2023-06-26 PROCEDURE — 7100000000 HC PACU RECOVERY - FIRST 15 MIN: Performed by: SURGERY

## 2023-06-26 PROCEDURE — 6370000000 HC RX 637 (ALT 250 FOR IP): Performed by: SURGERY

## 2023-06-26 PROCEDURE — 3700000000 HC ANESTHESIA ATTENDED CARE: Performed by: SURGERY

## 2023-06-26 PROCEDURE — 041K09Q BYPASS RIGHT FEMORAL ARTERY TO LOWER EXTREMITY ARTERY WITH AUTOLOGOUS VENOUS TISSUE, OPEN APPROACH: ICD-10-PCS | Performed by: SURGERY

## 2023-06-26 PROCEDURE — 86900 BLOOD TYPING SEROLOGIC ABO: CPT

## 2023-06-26 PROCEDURE — 6360000002 HC RX W HCPCS

## 2023-06-26 PROCEDURE — 35566 ART BYP FEM-ANT-POST TIB/PRL: CPT | Performed by: SURGERY

## 2023-06-26 PROCEDURE — C1757 CATH, THROMBECTOMY/EMBOLECT: HCPCS | Performed by: SURGERY

## 2023-06-26 PROCEDURE — 2700000000 HC OXYGEN THERAPY PER DAY

## 2023-06-26 PROCEDURE — A4217 STERILE WATER/SALINE, 500 ML: HCPCS | Performed by: SURGERY

## 2023-06-26 PROCEDURE — 86850 RBC ANTIBODY SCREEN: CPT

## 2023-06-26 PROCEDURE — 3700000001 HC ADD 15 MINUTES (ANESTHESIA): Performed by: SURGERY

## 2023-06-26 PROCEDURE — 06BP0ZZ EXCISION OF RIGHT SAPHENOUS VEIN, OPEN APPROACH: ICD-10-PCS | Performed by: SURGERY

## 2023-06-26 PROCEDURE — 94761 N-INVAS EAR/PLS OXIMETRY MLT: CPT

## 2023-06-26 PROCEDURE — 3600000007 HC SURGERY HYBRID BASE: Performed by: SURGERY

## 2023-06-26 RX ORDER — SODIUM CHLORIDE 0.9 % (FLUSH) 0.9 %
5-40 SYRINGE (ML) INJECTION EVERY 12 HOURS SCHEDULED
Status: DISCONTINUED | OUTPATIENT
Start: 2023-06-26 | End: 2023-06-26 | Stop reason: HOSPADM

## 2023-06-26 RX ORDER — SODIUM CHLORIDE 0.9 % (FLUSH) 0.9 %
5-40 SYRINGE (ML) INJECTION EVERY 12 HOURS SCHEDULED
Status: DISCONTINUED | OUTPATIENT
Start: 2023-06-26 | End: 2023-06-27 | Stop reason: HOSPADM

## 2023-06-26 RX ORDER — POTASSIUM CHLORIDE 7.45 MG/ML
10 INJECTION INTRAVENOUS PRN
Status: DISCONTINUED | OUTPATIENT
Start: 2023-06-26 | End: 2023-06-27 | Stop reason: HOSPADM

## 2023-06-26 RX ORDER — DEXAMETHASONE SODIUM PHOSPHATE 4 MG/ML
INJECTION, SOLUTION INTRA-ARTICULAR; INTRALESIONAL; INTRAMUSCULAR; INTRAVENOUS; SOFT TISSUE PRN
Status: DISCONTINUED | OUTPATIENT
Start: 2023-06-26 | End: 2023-06-26 | Stop reason: SDUPTHER

## 2023-06-26 RX ORDER — LABETALOL HYDROCHLORIDE 5 MG/ML
5 INJECTION, SOLUTION INTRAVENOUS EVERY 10 MIN PRN
Status: DISCONTINUED | OUTPATIENT
Start: 2023-06-26 | End: 2023-06-26 | Stop reason: HOSPADM

## 2023-06-26 RX ORDER — SPIRONOLACTONE 25 MG/1
25 TABLET ORAL DAILY
Status: DISCONTINUED | OUTPATIENT
Start: 2023-06-26 | End: 2023-06-27 | Stop reason: HOSPADM

## 2023-06-26 RX ORDER — HYDRALAZINE HYDROCHLORIDE 20 MG/ML
10 INJECTION INTRAMUSCULAR; INTRAVENOUS
Status: DISCONTINUED | OUTPATIENT
Start: 2023-06-26 | End: 2023-06-27 | Stop reason: HOSPADM

## 2023-06-26 RX ORDER — SODIUM CHLORIDE 0.9 % (FLUSH) 0.9 %
5-40 SYRINGE (ML) INJECTION PRN
Status: DISCONTINUED | OUTPATIENT
Start: 2023-06-26 | End: 2023-06-26 | Stop reason: HOSPADM

## 2023-06-26 RX ORDER — HEPARIN SODIUM 1000 [USP'U]/ML
INJECTION, SOLUTION INTRAVENOUS; SUBCUTANEOUS PRN
Status: DISCONTINUED | OUTPATIENT
Start: 2023-06-26 | End: 2023-06-26 | Stop reason: SDUPTHER

## 2023-06-26 RX ORDER — FENTANYL CITRATE 50 UG/ML
INJECTION, SOLUTION INTRAMUSCULAR; INTRAVENOUS PRN
Status: DISCONTINUED | OUTPATIENT
Start: 2023-06-26 | End: 2023-06-26 | Stop reason: SDUPTHER

## 2023-06-26 RX ORDER — LIDOCAINE HYDROCHLORIDE 20 MG/ML
INJECTION, SOLUTION EPIDURAL; INFILTRATION; INTRACAUDAL; PERINEURAL PRN
Status: DISCONTINUED | OUTPATIENT
Start: 2023-06-26 | End: 2023-06-26 | Stop reason: SDUPTHER

## 2023-06-26 RX ORDER — CLONIDINE HYDROCHLORIDE 0.1 MG/1
0.1 TABLET ORAL
Status: DISCONTINUED | OUTPATIENT
Start: 2023-06-26 | End: 2023-06-27 | Stop reason: HOSPADM

## 2023-06-26 RX ORDER — SODIUM CHLORIDE 9 MG/ML
INJECTION, SOLUTION INTRAVENOUS PRN
Status: DISCONTINUED | OUTPATIENT
Start: 2023-06-26 | End: 2023-06-26 | Stop reason: HOSPADM

## 2023-06-26 RX ORDER — HYDROMORPHONE HCL 110MG/55ML
PATIENT CONTROLLED ANALGESIA SYRINGE INTRAVENOUS PRN
Status: DISCONTINUED | OUTPATIENT
Start: 2023-06-26 | End: 2023-06-26 | Stop reason: SDUPTHER

## 2023-06-26 RX ORDER — PRAMIPEXOLE DIHYDROCHLORIDE 0.25 MG/1
0.25 TABLET ORAL NIGHTLY
Status: DISCONTINUED | OUTPATIENT
Start: 2023-06-26 | End: 2023-06-27 | Stop reason: HOSPADM

## 2023-06-26 RX ORDER — APREPITANT 40 MG/1
40 CAPSULE ORAL ONCE
Status: COMPLETED | OUTPATIENT
Start: 2023-06-26 | End: 2023-06-26

## 2023-06-26 RX ORDER — OXYCODONE HYDROCHLORIDE 5 MG/1
10 TABLET ORAL PRN
Status: DISCONTINUED | OUTPATIENT
Start: 2023-06-26 | End: 2023-06-26 | Stop reason: HOSPADM

## 2023-06-26 RX ORDER — ONDANSETRON 2 MG/ML
4 INJECTION INTRAMUSCULAR; INTRAVENOUS EVERY 6 HOURS PRN
Status: DISCONTINUED | OUTPATIENT
Start: 2023-06-26 | End: 2023-06-27 | Stop reason: HOSPADM

## 2023-06-26 RX ORDER — MEPERIDINE HYDROCHLORIDE 50 MG/ML
12.5 INJECTION INTRAMUSCULAR; INTRAVENOUS; SUBCUTANEOUS EVERY 5 MIN PRN
Status: DISCONTINUED | OUTPATIENT
Start: 2023-06-26 | End: 2023-06-26 | Stop reason: HOSPADM

## 2023-06-26 RX ORDER — ASPIRIN 81 MG/1
81 TABLET ORAL DAILY
Status: DISCONTINUED | OUTPATIENT
Start: 2023-06-26 | End: 2023-06-27 | Stop reason: HOSPADM

## 2023-06-26 RX ORDER — OXYCODONE HYDROCHLORIDE 5 MG/1
10 TABLET ORAL EVERY 4 HOURS PRN
Status: DISCONTINUED | OUTPATIENT
Start: 2023-06-26 | End: 2023-06-27 | Stop reason: HOSPADM

## 2023-06-26 RX ORDER — SODIUM CHLORIDE, SODIUM LACTATE, POTASSIUM CHLORIDE, CALCIUM CHLORIDE 600; 310; 30; 20 MG/100ML; MG/100ML; MG/100ML; MG/100ML
INJECTION, SOLUTION INTRAVENOUS CONTINUOUS PRN
Status: DISCONTINUED | OUTPATIENT
Start: 2023-06-26 | End: 2023-06-26 | Stop reason: SDUPTHER

## 2023-06-26 RX ORDER — PROPOFOL 10 MG/ML
INJECTION, EMULSION INTRAVENOUS PRN
Status: DISCONTINUED | OUTPATIENT
Start: 2023-06-26 | End: 2023-06-26 | Stop reason: SDUPTHER

## 2023-06-26 RX ORDER — OXYCODONE HYDROCHLORIDE 5 MG/1
5 TABLET ORAL NIGHTLY PRN
Status: ON HOLD | COMMUNITY
End: 2023-06-27 | Stop reason: HOSPADM

## 2023-06-26 RX ORDER — LOSARTAN POTASSIUM 25 MG/1
25 TABLET ORAL DAILY
Status: DISCONTINUED | OUTPATIENT
Start: 2023-06-26 | End: 2023-06-27 | Stop reason: HOSPADM

## 2023-06-26 RX ORDER — OXYCODONE HYDROCHLORIDE 5 MG/1
5 TABLET ORAL PRN
Status: DISCONTINUED | OUTPATIENT
Start: 2023-06-26 | End: 2023-06-26 | Stop reason: HOSPADM

## 2023-06-26 RX ORDER — SODIUM CHLORIDE 9 MG/ML
INJECTION, SOLUTION INTRAVENOUS CONTINUOUS
Status: DISCONTINUED | OUTPATIENT
Start: 2023-06-26 | End: 2023-06-27

## 2023-06-26 RX ORDER — DIPHENHYDRAMINE HYDROCHLORIDE 50 MG/ML
12.5 INJECTION INTRAMUSCULAR; INTRAVENOUS
Status: DISCONTINUED | OUTPATIENT
Start: 2023-06-26 | End: 2023-06-26 | Stop reason: HOSPADM

## 2023-06-26 RX ORDER — MORPHINE SULFATE 4 MG/ML
4 INJECTION, SOLUTION INTRAMUSCULAR; INTRAVENOUS
Status: DISCONTINUED | OUTPATIENT
Start: 2023-06-26 | End: 2023-06-27 | Stop reason: HOSPADM

## 2023-06-26 RX ORDER — SODIUM CHLORIDE 0.9 % (FLUSH) 0.9 %
5-40 SYRINGE (ML) INJECTION PRN
Status: DISCONTINUED | OUTPATIENT
Start: 2023-06-26 | End: 2023-06-27 | Stop reason: HOSPADM

## 2023-06-26 RX ORDER — OXYCODONE HYDROCHLORIDE 5 MG/1
5 TABLET ORAL EVERY 4 HOURS PRN
Status: DISCONTINUED | OUTPATIENT
Start: 2023-06-26 | End: 2023-06-27 | Stop reason: HOSPADM

## 2023-06-26 RX ORDER — ATORVASTATIN CALCIUM 40 MG/1
40 TABLET, FILM COATED ORAL NIGHTLY
Status: DISCONTINUED | OUTPATIENT
Start: 2023-06-26 | End: 2023-06-27 | Stop reason: HOSPADM

## 2023-06-26 RX ORDER — ONDANSETRON 2 MG/ML
INJECTION INTRAMUSCULAR; INTRAVENOUS PRN
Status: DISCONTINUED | OUTPATIENT
Start: 2023-06-26 | End: 2023-06-26 | Stop reason: SDUPTHER

## 2023-06-26 RX ORDER — SODIUM CHLORIDE, SODIUM LACTATE, POTASSIUM CHLORIDE, CALCIUM CHLORIDE 600; 310; 30; 20 MG/100ML; MG/100ML; MG/100ML; MG/100ML
INJECTION, SOLUTION INTRAVENOUS CONTINUOUS
Status: DISCONTINUED | OUTPATIENT
Start: 2023-06-26 | End: 2023-06-26 | Stop reason: HOSPADM

## 2023-06-26 RX ORDER — ROCURONIUM BROMIDE 10 MG/ML
INJECTION, SOLUTION INTRAVENOUS PRN
Status: DISCONTINUED | OUTPATIENT
Start: 2023-06-26 | End: 2023-06-26 | Stop reason: SDUPTHER

## 2023-06-26 RX ORDER — LIDOCAINE HYDROCHLORIDE 10 MG/ML
0.3 INJECTION, SOLUTION EPIDURAL; INFILTRATION; INTRACAUDAL; PERINEURAL
Status: DISCONTINUED | OUTPATIENT
Start: 2023-06-26 | End: 2023-06-26 | Stop reason: HOSPADM

## 2023-06-26 RX ORDER — SODIUM CHLORIDE 9 MG/ML
INJECTION, SOLUTION INTRAVENOUS PRN
Status: DISCONTINUED | OUTPATIENT
Start: 2023-06-26 | End: 2023-06-27 | Stop reason: HOSPADM

## 2023-06-26 RX ORDER — MORPHINE SULFATE 2 MG/ML
2 INJECTION, SOLUTION INTRAMUSCULAR; INTRAVENOUS
Status: DISCONTINUED | OUTPATIENT
Start: 2023-06-26 | End: 2023-06-27 | Stop reason: HOSPADM

## 2023-06-26 RX ORDER — PROMETHAZINE HYDROCHLORIDE 25 MG/1
12.5 TABLET ORAL EVERY 6 HOURS PRN
Status: DISCONTINUED | OUTPATIENT
Start: 2023-06-26 | End: 2023-06-27 | Stop reason: HOSPADM

## 2023-06-26 RX ORDER — ONDANSETRON 2 MG/ML
4 INJECTION INTRAMUSCULAR; INTRAVENOUS
Status: DISCONTINUED | OUTPATIENT
Start: 2023-06-26 | End: 2023-06-26 | Stop reason: HOSPADM

## 2023-06-26 RX ORDER — PHENYLEPHRINE HCL IN 0.9% NACL 1 MG/10 ML
SYRINGE (ML) INTRAVENOUS PRN
Status: DISCONTINUED | OUTPATIENT
Start: 2023-06-26 | End: 2023-06-26 | Stop reason: SDUPTHER

## 2023-06-26 RX ORDER — CEFAZOLIN SODIUM IN 0.9 % NACL 2 G/100 ML
2000 PLASTIC BAG, INJECTION (ML) INTRAVENOUS
Status: DISCONTINUED | OUTPATIENT
Start: 2023-06-26 | End: 2023-06-26 | Stop reason: HOSPADM

## 2023-06-26 RX ORDER — CALCIUM GLUCONATE 94 MG/ML
INJECTION, SOLUTION INTRAVENOUS PRN
Status: DISCONTINUED | OUTPATIENT
Start: 2023-06-26 | End: 2023-06-26 | Stop reason: SDUPTHER

## 2023-06-26 RX ADMIN — ROCURONIUM BROMIDE 10 MG: 50 INJECTION, SOLUTION INTRAVENOUS at 09:21

## 2023-06-26 RX ADMIN — ONDANSETRON 4 MG: 2 INJECTION INTRAMUSCULAR; INTRAVENOUS at 07:39

## 2023-06-26 RX ADMIN — SUGAMMADEX 200 MG: 100 INJECTION, SOLUTION INTRAVENOUS at 10:38

## 2023-06-26 RX ADMIN — Medication 50 MCG: at 08:09

## 2023-06-26 RX ADMIN — HYDROMORPHONE HYDROCHLORIDE 0.5 MG: 2 INJECTION, SOLUTION INTRAMUSCULAR; INTRAVENOUS; SUBCUTANEOUS at 10:49

## 2023-06-26 RX ADMIN — ONDANSETRON 4 MG: 2 INJECTION INTRAMUSCULAR; INTRAVENOUS at 14:48

## 2023-06-26 RX ADMIN — HYDROMORPHONE HYDROCHLORIDE 0.5 MG: 1 INJECTION, SOLUTION INTRAMUSCULAR; INTRAVENOUS; SUBCUTANEOUS at 11:57

## 2023-06-26 RX ADMIN — HEPARIN SODIUM 5000 UNITS: 1000 INJECTION, SOLUTION INTRAVENOUS; SUBCUTANEOUS at 09:14

## 2023-06-26 RX ADMIN — ROCURONIUM BROMIDE 10 MG: 50 INJECTION, SOLUTION INTRAVENOUS at 09:53

## 2023-06-26 RX ADMIN — Medication 100 MCG: at 09:01

## 2023-06-26 RX ADMIN — APREPITANT 40 MG: 40 CAPSULE ORAL at 06:19

## 2023-06-26 RX ADMIN — FENTANYL CITRATE 50 MCG: 50 INJECTION, SOLUTION INTRAMUSCULAR; INTRAVENOUS at 09:47

## 2023-06-26 RX ADMIN — ATORVASTATIN CALCIUM 40 MG: 40 TABLET, FILM COATED ORAL at 20:22

## 2023-06-26 RX ADMIN — CALCIUM GLUCONATE 2 G: 98 INJECTION, SOLUTION INTRAVENOUS at 10:29

## 2023-06-26 RX ADMIN — OXYCODONE HYDROCHLORIDE 5 MG: 5 TABLET ORAL at 14:53

## 2023-06-26 RX ADMIN — PHENYLEPHRINE HYDROCHLORIDE 15 MCG/MIN: 10 INJECTION INTRAVENOUS at 09:10

## 2023-06-26 RX ADMIN — PROPOFOL 50 MG: 10 INJECTION, EMULSION INTRAVENOUS at 07:40

## 2023-06-26 RX ADMIN — PRAMIPEXOLE DIHYDROCHLORIDE 0.25 MG: 0.25 TABLET ORAL at 20:22

## 2023-06-26 RX ADMIN — ROCURONIUM BROMIDE 10 MG: 50 INJECTION, SOLUTION INTRAVENOUS at 08:53

## 2023-06-26 RX ADMIN — SODIUM CHLORIDE, SODIUM LACTATE, POTASSIUM CHLORIDE, AND CALCIUM CHLORIDE: .6; .31; .03; .02 INJECTION, SOLUTION INTRAVENOUS at 07:32

## 2023-06-26 RX ADMIN — OXYCODONE HYDROCHLORIDE 10 MG: 5 TABLET ORAL at 20:28

## 2023-06-26 RX ADMIN — PROPOFOL 50 MG: 10 INJECTION, EMULSION INTRAVENOUS at 07:39

## 2023-06-26 RX ADMIN — LIDOCAINE HYDROCHLORIDE 80 MG: 20 INJECTION, SOLUTION EPIDURAL; INFILTRATION; INTRACAUDAL; PERINEURAL at 07:39

## 2023-06-26 RX ADMIN — SODIUM CHLORIDE, SODIUM LACTATE, POTASSIUM CHLORIDE, AND CALCIUM CHLORIDE: .6; .31; .03; .02 INJECTION, SOLUTION INTRAVENOUS at 09:10

## 2023-06-26 RX ADMIN — PROPOFOL 30 MG: 10 INJECTION, EMULSION INTRAVENOUS at 10:42

## 2023-06-26 RX ADMIN — PROPOFOL 30 MG: 10 INJECTION, EMULSION INTRAVENOUS at 10:38

## 2023-06-26 RX ADMIN — ROCURONIUM BROMIDE 50 MG: 50 INJECTION, SOLUTION INTRAVENOUS at 07:39

## 2023-06-26 RX ADMIN — DEXAMETHASONE SODIUM PHOSPHATE 4 MG: 4 INJECTION, SOLUTION INTRAMUSCULAR; INTRAVENOUS at 07:46

## 2023-06-26 RX ADMIN — Medication 100 MCG: at 08:26

## 2023-06-26 RX ADMIN — SODIUM CHLORIDE: 9 INJECTION, SOLUTION INTRAVENOUS at 14:45

## 2023-06-26 RX ADMIN — Medication 100 MCG: at 08:53

## 2023-06-26 RX ADMIN — FENTANYL CITRATE 50 MCG: 50 INJECTION, SOLUTION INTRAMUSCULAR; INTRAVENOUS at 08:00

## 2023-06-26 RX ADMIN — Medication 50 MCG: at 08:32

## 2023-06-26 RX ADMIN — HYDROMORPHONE HYDROCHLORIDE 0.5 MG: 1 INJECTION, SOLUTION INTRAMUSCULAR; INTRAVENOUS; SUBCUTANEOUS at 12:07

## 2023-06-26 RX ADMIN — HYDROMORPHONE HYDROCHLORIDE 0.25 MG: 2 INJECTION, SOLUTION INTRAMUSCULAR; INTRAVENOUS; SUBCUTANEOUS at 11:04

## 2023-06-26 RX ADMIN — Medication 50 MCG: at 08:21

## 2023-06-26 RX ADMIN — ROCURONIUM BROMIDE 20 MG: 50 INJECTION, SOLUTION INTRAVENOUS at 08:21

## 2023-06-26 RX ADMIN — ASPIRIN 81 MG: 81 TABLET, COATED ORAL at 20:22

## 2023-06-26 ASSESSMENT — PAIN SCALES - GENERAL
PAINLEVEL_OUTOF10: 1
PAINLEVEL_OUTOF10: 3
PAINLEVEL_OUTOF10: 5
PAINLEVEL_OUTOF10: 5

## 2023-06-26 ASSESSMENT — PAIN DESCRIPTION - FREQUENCY
FREQUENCY: CONTINUOUS
FREQUENCY: CONTINUOUS

## 2023-06-26 ASSESSMENT — COPD QUESTIONNAIRES: CAT_SEVERITY: MILD

## 2023-06-26 ASSESSMENT — PAIN DESCRIPTION - ORIENTATION
ORIENTATION: RIGHT

## 2023-06-26 ASSESSMENT — PAIN - FUNCTIONAL ASSESSMENT
PAIN_FUNCTIONAL_ASSESSMENT: ACTIVITIES ARE NOT PREVENTED
PAIN_FUNCTIONAL_ASSESSMENT: 0-10
PAIN_FUNCTIONAL_ASSESSMENT: ACTIVITIES ARE NOT PREVENTED
PAIN_FUNCTIONAL_ASSESSMENT: ACTIVITIES ARE NOT PREVENTED

## 2023-06-26 ASSESSMENT — PAIN DESCRIPTION - PAIN TYPE
TYPE: ACUTE PAIN;SURGICAL PAIN
TYPE: ACUTE PAIN;SURGICAL PAIN

## 2023-06-26 ASSESSMENT — PAIN DESCRIPTION - LOCATION
LOCATION: LEG

## 2023-06-26 ASSESSMENT — PAIN DESCRIPTION - DESCRIPTORS
DESCRIPTORS: ACHING

## 2023-06-26 ASSESSMENT — PAIN DESCRIPTION - ONSET
ONSET: AWAKENED FROM SLEEP
ONSET: AWAKENED FROM SLEEP

## 2023-06-27 VITALS
SYSTOLIC BLOOD PRESSURE: 97 MMHG | DIASTOLIC BLOOD PRESSURE: 60 MMHG | OXYGEN SATURATION: 88 % | WEIGHT: 166 LBS | HEIGHT: 61 IN | BODY MASS INDEX: 31.34 KG/M2 | HEART RATE: 87 BPM | TEMPERATURE: 97.8 F | RESPIRATION RATE: 18 BRPM

## 2023-06-27 LAB
ANION GAP SERPL CALCULATED.3IONS-SCNC: 9 MMOL/L (ref 3–16)
BUN SERPL-MCNC: 15 MG/DL (ref 7–20)
CALCIUM SERPL-MCNC: 9.1 MG/DL (ref 8.3–10.6)
CHLORIDE SERPL-SCNC: 104 MMOL/L (ref 99–110)
CO2 SERPL-SCNC: 24 MMOL/L (ref 21–32)
CREAT SERPL-MCNC: 0.7 MG/DL (ref 0.6–1.2)
DEPRECATED RDW RBC AUTO: 17.2 % (ref 12.4–15.4)
GFR SERPLBLD CREATININE-BSD FMLA CKD-EPI: >60 ML/MIN/{1.73_M2}
GLUCOSE SERPL-MCNC: 133 MG/DL (ref 70–99)
HCT VFR BLD AUTO: 26.1 % (ref 36–48)
HGB BLD-MCNC: 8.4 G/DL (ref 12–16)
MCH RBC QN AUTO: 28.2 PG (ref 26–34)
MCHC RBC AUTO-ENTMCNC: 32.2 G/DL (ref 31–36)
MCV RBC AUTO: 87.5 FL (ref 80–100)
PLATELET # BLD AUTO: 354 K/UL (ref 135–450)
PMV BLD AUTO: 6.9 FL (ref 5–10.5)
POTASSIUM SERPL-SCNC: 4.6 MMOL/L (ref 3.5–5.1)
RBC # BLD AUTO: 2.98 M/UL (ref 4–5.2)
SODIUM SERPL-SCNC: 137 MMOL/L (ref 136–145)
WBC # BLD AUTO: 13.2 K/UL (ref 4–11)

## 2023-06-27 PROCEDURE — 6370000000 HC RX 637 (ALT 250 FOR IP): Performed by: SURGERY

## 2023-06-27 PROCEDURE — 85027 COMPLETE CBC AUTOMATED: CPT

## 2023-06-27 PROCEDURE — 97530 THERAPEUTIC ACTIVITIES: CPT

## 2023-06-27 PROCEDURE — 97162 PT EVAL MOD COMPLEX 30 MIN: CPT

## 2023-06-27 PROCEDURE — 80048 BASIC METABOLIC PNL TOTAL CA: CPT

## 2023-06-27 PROCEDURE — 97116 GAIT TRAINING THERAPY: CPT

## 2023-06-27 PROCEDURE — 36415 COLL VENOUS BLD VENIPUNCTURE: CPT

## 2023-06-27 PROCEDURE — 2580000003 HC RX 258: Performed by: SURGERY

## 2023-06-27 RX ORDER — SPIRONOLACTONE 25 MG/1
TABLET ORAL
Qty: 30 TABLET | Refills: 0 | OUTPATIENT
Start: 2023-06-27

## 2023-06-27 RX ORDER — ASPIRIN 81 MG/1
TABLET, COATED ORAL
Qty: 30 TABLET | Refills: 3 | OUTPATIENT
Start: 2023-06-27

## 2023-06-27 RX ORDER — POTASSIUM CHLORIDE 750 MG/1
TABLET, EXTENDED RELEASE ORAL
Qty: 60 TABLET | OUTPATIENT
Start: 2023-06-27

## 2023-06-27 RX ORDER — ATORVASTATIN CALCIUM 40 MG/1
40 TABLET, FILM COATED ORAL NIGHTLY
Qty: 30 TABLET | Refills: 0 | OUTPATIENT
Start: 2023-06-27

## 2023-06-27 RX ORDER — OXYCODONE HYDROCHLORIDE 10 MG/1
5 TABLET ORAL EVERY 6 HOURS PRN
Qty: 14 TABLET | Refills: 0 | Status: SHIPPED | OUTPATIENT
Start: 2023-06-27 | End: 2023-07-04

## 2023-06-27 RX ORDER — OXYCODONE HYDROCHLORIDE 5 MG/1
5 TABLET ORAL EVERY 6 HOURS PRN
Qty: 28 TABLET | Refills: 0 | Status: SHIPPED | OUTPATIENT
Start: 2023-06-27 | End: 2023-07-04

## 2023-06-27 RX ORDER — FERROUS SULFATE 325(65) MG
325 TABLET ORAL 2 TIMES DAILY WITH MEALS
Status: DISCONTINUED | OUTPATIENT
Start: 2023-06-27 | End: 2023-06-27 | Stop reason: HOSPADM

## 2023-06-27 RX ORDER — LOSARTAN POTASSIUM 25 MG/1
TABLET ORAL
Qty: 30 TABLET | Refills: 0 | OUTPATIENT
Start: 2023-06-27

## 2023-06-27 RX ORDER — FERROUS SULFATE 325(65) MG
325 TABLET ORAL 2 TIMES DAILY WITH MEALS
Qty: 30 TABLET | Refills: 1 | Status: SHIPPED | OUTPATIENT
Start: 2023-06-27

## 2023-06-27 RX ADMIN — Medication 10 ML: at 08:50

## 2023-06-27 RX ADMIN — SODIUM CHLORIDE: 9 INJECTION, SOLUTION INTRAVENOUS at 04:20

## 2023-06-27 RX ADMIN — LOSARTAN POTASSIUM 25 MG: 25 TABLET, FILM COATED ORAL at 08:48

## 2023-06-27 RX ADMIN — OXYCODONE HYDROCHLORIDE 5 MG: 5 TABLET ORAL at 09:20

## 2023-06-27 RX ADMIN — OXYCODONE HYDROCHLORIDE 5 MG: 5 TABLET ORAL at 14:58

## 2023-06-27 RX ADMIN — ASPIRIN 81 MG: 81 TABLET, COATED ORAL at 08:48

## 2023-06-27 RX ADMIN — FERROUS SULFATE TAB 325 MG (65 MG ELEMENTAL FE) 325 MG: 325 (65 FE) TAB at 08:48

## 2023-06-27 RX ADMIN — SPIRONOLACTONE 25 MG: 25 TABLET ORAL at 08:49

## 2023-06-27 ASSESSMENT — PAIN DESCRIPTION - LOCATION
LOCATION: STERNUM
LOCATION_2: LEG
LOCATION: LEG
LOCATION: LEG;STERNUM
LOCATION_2: LEG
LOCATION: STERNUM

## 2023-06-27 ASSESSMENT — PAIN DESCRIPTION - FREQUENCY
FREQUENCY: CONTINUOUS
FREQUENCY: CONTINUOUS

## 2023-06-27 ASSESSMENT — PAIN DESCRIPTION - DESCRIPTORS
DESCRIPTORS: ACHING;DISCOMFORT
DESCRIPTORS: ACHING;DISCOMFORT;SORE
DESCRIPTORS: ACHING;SORE
DESCRIPTORS_2: ACHING;DISCOMFORT;SORE
DESCRIPTORS_2: ACHING;DISCOMFORT;SORE

## 2023-06-27 ASSESSMENT — PAIN - FUNCTIONAL ASSESSMENT
PAIN_FUNCTIONAL_ASSESSMENT_SITE2: ACTIVITIES ARE NOT PREVENTED
PAIN_FUNCTIONAL_ASSESSMENT: ACTIVITIES ARE NOT PREVENTED

## 2023-06-27 ASSESSMENT — PAIN DESCRIPTION - ORIENTATION
ORIENTATION_2: RIGHT
ORIENTATION: MID;RIGHT;LOWER
ORIENTATION: RIGHT
ORIENTATION_2: RIGHT;MID;LOWER
ORIENTATION: MID
ORIENTATION: MID

## 2023-06-27 ASSESSMENT — PAIN SCALES - GENERAL
PAINLEVEL_OUTOF10: 2

## 2023-06-27 ASSESSMENT — PAIN DESCRIPTION - PAIN TYPE
TYPE: ACUTE PAIN;SURGICAL PAIN
TYPE: ACUTE PAIN;SURGICAL PAIN

## 2023-06-27 ASSESSMENT — PAIN DESCRIPTION - INTENSITY
RATING_2: 1
RATING_2: 1

## 2023-06-27 ASSESSMENT — PAIN DESCRIPTION - ONSET
ONSET: ON-GOING
ONSET: ON-GOING

## 2023-06-28 RX ORDER — LOSARTAN POTASSIUM 25 MG/1
25 TABLET ORAL DAILY
Qty: 90 TABLET | Refills: 3 | OUTPATIENT
Start: 2023-06-28

## 2023-06-28 RX ORDER — SPIRONOLACTONE 25 MG/1
25 TABLET ORAL DAILY
Qty: 90 TABLET | Refills: 3 | OUTPATIENT
Start: 2023-06-28

## 2023-06-28 RX ORDER — ATORVASTATIN CALCIUM 40 MG/1
40 TABLET, FILM COATED ORAL NIGHTLY
Qty: 90 TABLET | Refills: 3 | OUTPATIENT
Start: 2023-06-28

## 2023-07-05 ENCOUNTER — TELEPHONE (OUTPATIENT)
Dept: CARDIOLOGY CLINIC | Age: 68
End: 2023-07-05

## 2023-07-05 ENCOUNTER — PATIENT MESSAGE (OUTPATIENT)
Dept: CARDIOLOGY CLINIC | Age: 68
End: 2023-07-05

## 2023-07-05 DIAGNOSIS — I50.40 COMBINED SYSTOLIC AND DIASTOLIC CONGESTIVE HEART FAILURE, UNSPECIFIED HF CHRONICITY (HCC): Primary | ICD-10-CM

## 2023-07-05 DIAGNOSIS — I25.5 ISCHEMIC CARDIOMYOPATHY: ICD-10-CM

## 2023-07-05 NOTE — TELEPHONE ENCOUNTER
Pts daughter Karime Ayala stated that this past week pt has been experiencing chest pain, fever and nausea. Fever and nausea has improved. Pts oxygen was 84 on 7/4 and pt was given oxygen. Pt is still experiencing chest pain. Please advise.

## 2023-07-05 NOTE — TELEPHONE ENCOUNTER
Pt returned call. Pt states no SOB. It is a pulling sensation and heaviness , sharp pain when deep breath. Pt is on 2.5/5 oxygen.

## 2023-07-05 NOTE — TELEPHONE ENCOUNTER
Lvm for pt to return call regarding previous message. How much oxygen is she on? Does she feel SOB at rest or on exertion? What does the CP feel jade (sharp, heaviness, pressure. Esequiel Taisha )

## 2023-07-14 ENCOUNTER — OFFICE VISIT (OUTPATIENT)
Dept: VASCULAR SURGERY | Age: 68
End: 2023-07-14

## 2023-07-14 VITALS
WEIGHT: 166 LBS | HEIGHT: 61 IN | SYSTOLIC BLOOD PRESSURE: 110 MMHG | BODY MASS INDEX: 31.34 KG/M2 | DIASTOLIC BLOOD PRESSURE: 70 MMHG

## 2023-07-14 DIAGNOSIS — Z09 POSTOPERATIVE EXAMINATION: ICD-10-CM

## 2023-07-14 DIAGNOSIS — Z95.828 H/O EXTREMITY BYPASS GRAFT: ICD-10-CM

## 2023-07-14 DIAGNOSIS — G89.18 POST-OP PAIN: Primary | ICD-10-CM

## 2023-07-14 DIAGNOSIS — I73.9 PVD (PERIPHERAL VASCULAR DISEASE) (HCC): ICD-10-CM

## 2023-07-14 PROCEDURE — 99024 POSTOP FOLLOW-UP VISIT: CPT | Performed by: SURGERY

## 2023-07-14 RX ORDER — OXYCODONE HYDROCHLORIDE 5 MG/1
5 TABLET ORAL EVERY 6 HOURS PRN
Qty: 28 TABLET | Refills: 0 | Status: SHIPPED | OUTPATIENT
Start: 2023-07-14 | End: 2023-07-21

## 2023-07-14 NOTE — PROGRESS NOTES
Postop Progress Note    Subjective    Deleta Lauren presents to the office for postop follow up. Reports continued chest discomfort and BLE leg swelling since Heart surgery. R leg feeling better but burning along medial lower leg. Objective    Vitals:    07/14/23 1354   BP: 110/70     General: alert, cooperative and no distress  Incisions: healing well  Bilateral pitting edema. JULIET 1.1 R DP and PT    Assessment  Patent bypass    Plan  Will facilitate moving up Echo and Cardiology appt. Graft duplex 3 months.      Electronically signed by Jorge Mcdonough MD on 7/14/2023 at 2:42 PM

## 2023-07-17 ENCOUNTER — PROCEDURE VISIT (OUTPATIENT)
Dept: CARDIOLOGY CLINIC | Age: 68
End: 2023-07-17
Payer: MEDICARE

## 2023-07-17 DIAGNOSIS — I25.10 CORONARY ARTERY DISEASE WITHOUT ANGINA PECTORIS, UNSPECIFIED VESSEL OR LESION TYPE, UNSPECIFIED WHETHER NATIVE OR TRANSPLANTED HEART: ICD-10-CM

## 2023-07-17 DIAGNOSIS — I25.5 ISCHEMIC CARDIOMYOPATHY: ICD-10-CM

## 2023-07-17 DIAGNOSIS — I50.41 ACUTE COMBINED SYSTOLIC AND DIASTOLIC CONGESTIVE HEART FAILURE (HCC): ICD-10-CM

## 2023-07-17 LAB
LV EF: 28 %
LVEF MODALITY: NORMAL

## 2023-07-17 PROCEDURE — 93306 TTE W/DOPPLER COMPLETE: CPT | Performed by: INTERNAL MEDICINE

## 2023-07-19 ENCOUNTER — TELEPHONE (OUTPATIENT)
Dept: CARDIOLOGY CLINIC | Age: 68
End: 2023-07-19

## 2023-07-19 ENCOUNTER — APPOINTMENT (OUTPATIENT)
Dept: GENERAL RADIOLOGY | Age: 68
DRG: 246 | End: 2023-07-19
Payer: MEDICARE

## 2023-07-19 ENCOUNTER — HOSPITAL ENCOUNTER (INPATIENT)
Age: 68
LOS: 2 days | Discharge: HOME | DRG: 246 | End: 2023-07-22
Attending: EMERGENCY MEDICINE | Admitting: INTERNAL MEDICINE
Payer: MEDICARE

## 2023-07-19 ENCOUNTER — APPOINTMENT (OUTPATIENT)
Dept: CT IMAGING | Age: 68
DRG: 246 | End: 2023-07-19
Payer: MEDICARE

## 2023-07-19 DIAGNOSIS — R06.89 DYSPNEA AND RESPIRATORY ABNORMALITIES: ICD-10-CM

## 2023-07-19 DIAGNOSIS — G89.18 POST-OP PAIN: ICD-10-CM

## 2023-07-19 DIAGNOSIS — R06.00 DYSPNEA AND RESPIRATORY ABNORMALITIES: ICD-10-CM

## 2023-07-19 DIAGNOSIS — R07.9 CHEST PAIN, UNSPECIFIED TYPE: Primary | ICD-10-CM

## 2023-07-19 LAB
ALBUMIN SERPL-MCNC: 3.5 G/DL (ref 3.4–5)
ALBUMIN/GLOB SERPL: 0.9 {RATIO} (ref 1.1–2.2)
ALP SERPL-CCNC: 113 U/L (ref 40–129)
ALT SERPL-CCNC: 14 U/L (ref 10–40)
ANION GAP SERPL CALCULATED.3IONS-SCNC: 12 MMOL/L (ref 3–16)
AST SERPL-CCNC: 45 U/L (ref 15–37)
BASOPHILS # BLD: 0.1 K/UL (ref 0–0.2)
BASOPHILS NFR BLD: 0.7 %
BILIRUB SERPL-MCNC: 0.3 MG/DL (ref 0–1)
BUN SERPL-MCNC: 10 MG/DL (ref 7–20)
CALCIUM SERPL-MCNC: 9.4 MG/DL (ref 8.3–10.6)
CHLORIDE SERPL-SCNC: 98 MMOL/L (ref 99–110)
CO2 SERPL-SCNC: 24 MMOL/L (ref 21–32)
CREAT SERPL-MCNC: 0.8 MG/DL (ref 0.6–1.2)
DEPRECATED RDW RBC AUTO: 18.9 % (ref 12.4–15.4)
EKG ATRIAL RATE: 101 BPM
EKG DIAGNOSIS: NORMAL
EKG P AXIS: 56 DEGREES
EKG P-R INTERVAL: 180 MS
EKG Q-T INTERVAL: 344 MS
EKG QRS DURATION: 90 MS
EKG QTC CALCULATION (BAZETT): 446 MS
EKG R AXIS: 63 DEGREES
EKG T AXIS: 20 DEGREES
EKG VENTRICULAR RATE: 101 BPM
EOSINOPHIL # BLD: 0 K/UL (ref 0–0.6)
EOSINOPHIL NFR BLD: 0.1 %
GFR SERPLBLD CREATININE-BSD FMLA CKD-EPI: >60 ML/MIN/{1.73_M2}
GLUCOSE SERPL-MCNC: 109 MG/DL (ref 70–99)
HCT VFR BLD AUTO: 30 % (ref 36–48)
HGB BLD-MCNC: 9.5 G/DL (ref 12–16)
LACTATE BLDV-SCNC: 1.4 MMOL/L (ref 0.4–1.9)
LYMPHOCYTES # BLD: 1.4 K/UL (ref 1–5.1)
LYMPHOCYTES NFR BLD: 11.9 %
MCH RBC QN AUTO: 28.1 PG (ref 26–34)
MCHC RBC AUTO-ENTMCNC: 31.6 G/DL (ref 31–36)
MCV RBC AUTO: 88.8 FL (ref 80–100)
MONOCYTES # BLD: 0.5 K/UL (ref 0–1.3)
MONOCYTES NFR BLD: 4.7 %
NEUTROPHILS # BLD: 9.8 K/UL (ref 1.7–7.7)
NEUTROPHILS NFR BLD: 82.6 %
NT-PROBNP SERPL-MCNC: 4221 PG/ML (ref 0–124)
PLATELET # BLD AUTO: 520 K/UL (ref 135–450)
PLATELET BLD QL SMEAR: ABNORMAL
PMV BLD AUTO: 7.3 FL (ref 5–10.5)
POTASSIUM SERPL-SCNC: 5.4 MMOL/L (ref 3.5–5.1)
PROT SERPL-MCNC: 7.4 G/DL (ref 6.4–8.2)
RBC # BLD AUTO: 3.38 M/UL (ref 4–5.2)
SLIDE REVIEW: ABNORMAL
SODIUM SERPL-SCNC: 134 MMOL/L (ref 136–145)
TROPONIN, HIGH SENSITIVITY: 19 NG/L (ref 0–14)
TROPONIN, HIGH SENSITIVITY: 21 NG/L (ref 0–14)
WBC # BLD AUTO: 11.8 K/UL (ref 4–11)

## 2023-07-19 PROCEDURE — 83605 ASSAY OF LACTIC ACID: CPT

## 2023-07-19 PROCEDURE — 99024 POSTOP FOLLOW-UP VISIT: CPT | Performed by: NURSE PRACTITIONER

## 2023-07-19 PROCEDURE — 99285 EMERGENCY DEPT VISIT HI MDM: CPT

## 2023-07-19 PROCEDURE — 6370000000 HC RX 637 (ALT 250 FOR IP): Performed by: INTERNAL MEDICINE

## 2023-07-19 PROCEDURE — 71046 X-RAY EXAM CHEST 2 VIEWS: CPT

## 2023-07-19 PROCEDURE — 84484 ASSAY OF TROPONIN QUANT: CPT

## 2023-07-19 PROCEDURE — 85025 COMPLETE CBC W/AUTO DIFF WBC: CPT

## 2023-07-19 PROCEDURE — G0378 HOSPITAL OBSERVATION PER HR: HCPCS

## 2023-07-19 PROCEDURE — 2060000000 HC ICU INTERMEDIATE R&B

## 2023-07-19 PROCEDURE — 93005 ELECTROCARDIOGRAM TRACING: CPT | Performed by: EMERGENCY MEDICINE

## 2023-07-19 PROCEDURE — 83880 ASSAY OF NATRIURETIC PEPTIDE: CPT

## 2023-07-19 PROCEDURE — 6370000000 HC RX 637 (ALT 250 FOR IP): Performed by: PHYSICIAN ASSISTANT

## 2023-07-19 PROCEDURE — 87040 BLOOD CULTURE FOR BACTERIA: CPT

## 2023-07-19 PROCEDURE — 6360000004 HC RX CONTRAST MEDICATION: Performed by: INTERNAL MEDICINE

## 2023-07-19 PROCEDURE — 71260 CT THORAX DX C+: CPT

## 2023-07-19 PROCEDURE — 2580000003 HC RX 258: Performed by: INTERNAL MEDICINE

## 2023-07-19 PROCEDURE — 80053 COMPREHEN METABOLIC PANEL: CPT

## 2023-07-19 PROCEDURE — 93010 ELECTROCARDIOGRAM REPORT: CPT | Performed by: INTERNAL MEDICINE

## 2023-07-19 RX ORDER — SODIUM CHLORIDE 9 MG/ML
INJECTION, SOLUTION INTRAVENOUS PRN
Status: DISCONTINUED | OUTPATIENT
Start: 2023-07-19 | End: 2023-07-22 | Stop reason: HOSPADM

## 2023-07-19 RX ORDER — PRAMIPEXOLE DIHYDROCHLORIDE 0.25 MG/1
0.25 TABLET ORAL NIGHTLY
Status: DISCONTINUED | OUTPATIENT
Start: 2023-07-19 | End: 2023-07-22 | Stop reason: HOSPADM

## 2023-07-19 RX ORDER — PANTOPRAZOLE SODIUM 40 MG/1
40 TABLET, DELAYED RELEASE ORAL DAILY
COMMUNITY
Start: 2023-07-04

## 2023-07-19 RX ORDER — ACETAMINOPHEN 325 MG/1
650 TABLET ORAL EVERY 6 HOURS PRN
Status: DISCONTINUED | OUTPATIENT
Start: 2023-07-19 | End: 2023-07-20 | Stop reason: SDUPTHER

## 2023-07-19 RX ORDER — OXYCODONE HYDROCHLORIDE 5 MG/1
5 TABLET ORAL
Status: DISCONTINUED | OUTPATIENT
Start: 2023-07-19 | End: 2023-07-22 | Stop reason: HOSPADM

## 2023-07-19 RX ORDER — CLOPIDOGREL BISULFATE 75 MG/1
75 TABLET ORAL DAILY
Status: DISCONTINUED | OUTPATIENT
Start: 2023-07-20 | End: 2023-07-22 | Stop reason: HOSPADM

## 2023-07-19 RX ORDER — PANTOPRAZOLE SODIUM 40 MG/1
40 TABLET, DELAYED RELEASE ORAL
Status: DISCONTINUED | OUTPATIENT
Start: 2023-07-20 | End: 2023-07-22 | Stop reason: HOSPADM

## 2023-07-19 RX ORDER — ONDANSETRON 2 MG/ML
4 INJECTION INTRAMUSCULAR; INTRAVENOUS EVERY 6 HOURS PRN
Status: DISCONTINUED | OUTPATIENT
Start: 2023-07-19 | End: 2023-07-22 | Stop reason: HOSPADM

## 2023-07-19 RX ORDER — ASPIRIN 325 MG
325 TABLET ORAL ONCE
Status: COMPLETED | OUTPATIENT
Start: 2023-07-19 | End: 2023-07-19

## 2023-07-19 RX ORDER — LOSARTAN POTASSIUM 25 MG/1
25 TABLET ORAL DAILY
Status: CANCELLED | OUTPATIENT
Start: 2023-07-20

## 2023-07-19 RX ORDER — ACETAMINOPHEN 650 MG/1
650 SUPPOSITORY RECTAL EVERY 6 HOURS PRN
Status: DISCONTINUED | OUTPATIENT
Start: 2023-07-19 | End: 2023-07-20 | Stop reason: SDUPTHER

## 2023-07-19 RX ORDER — ONDANSETRON 4 MG/1
4 TABLET, ORALLY DISINTEGRATING ORAL EVERY 8 HOURS PRN
Status: DISCONTINUED | OUTPATIENT
Start: 2023-07-19 | End: 2023-07-22 | Stop reason: HOSPADM

## 2023-07-19 RX ORDER — ASPIRIN 81 MG/1
81 TABLET ORAL DAILY
Status: DISCONTINUED | OUTPATIENT
Start: 2023-07-20 | End: 2023-07-22 | Stop reason: HOSPADM

## 2023-07-19 RX ORDER — FERROUS SULFATE 325(65) MG
325 TABLET ORAL 2 TIMES DAILY WITH MEALS
Status: DISCONTINUED | OUTPATIENT
Start: 2023-07-20 | End: 2023-07-22 | Stop reason: HOSPADM

## 2023-07-19 RX ORDER — SODIUM CHLORIDE 0.9 % (FLUSH) 0.9 %
5-40 SYRINGE (ML) INJECTION PRN
Status: DISCONTINUED | OUTPATIENT
Start: 2023-07-19 | End: 2023-07-22 | Stop reason: HOSPADM

## 2023-07-19 RX ORDER — OXYCODONE HYDROCHLORIDE 5 MG/1
5 TABLET ORAL ONCE
Status: COMPLETED | OUTPATIENT
Start: 2023-07-19 | End: 2023-07-19

## 2023-07-19 RX ORDER — SODIUM CHLORIDE 0.9 % (FLUSH) 0.9 %
5-40 SYRINGE (ML) INJECTION EVERY 12 HOURS SCHEDULED
Status: DISCONTINUED | OUTPATIENT
Start: 2023-07-19 | End: 2023-07-22 | Stop reason: HOSPADM

## 2023-07-19 RX ORDER — ATORVASTATIN CALCIUM 40 MG/1
40 TABLET, FILM COATED ORAL NIGHTLY
Status: DISCONTINUED | OUTPATIENT
Start: 2023-07-19 | End: 2023-07-22 | Stop reason: HOSPADM

## 2023-07-19 RX ORDER — FUROSEMIDE 10 MG/ML
20 INJECTION INTRAMUSCULAR; INTRAVENOUS 2 TIMES DAILY
Status: CANCELLED | OUTPATIENT
Start: 2023-07-19 | End: 2023-07-20

## 2023-07-19 RX ORDER — ENOXAPARIN SODIUM 100 MG/ML
40 INJECTION SUBCUTANEOUS DAILY
Status: DISCONTINUED | OUTPATIENT
Start: 2023-07-20 | End: 2023-07-22 | Stop reason: HOSPADM

## 2023-07-19 RX ORDER — DOCUSATE SODIUM 100 MG/1
100 CAPSULE, LIQUID FILLED ORAL DAILY
Status: DISCONTINUED | OUTPATIENT
Start: 2023-07-20 | End: 2023-07-22 | Stop reason: HOSPADM

## 2023-07-19 RX ORDER — POLYETHYLENE GLYCOL 3350 17 G/17G
17 POWDER, FOR SOLUTION ORAL DAILY PRN
Status: DISCONTINUED | OUTPATIENT
Start: 2023-07-19 | End: 2023-07-22 | Stop reason: HOSPADM

## 2023-07-19 RX ADMIN — ASPIRIN 325 MG: 325 TABLET ORAL at 13:06

## 2023-07-19 RX ADMIN — METOPROLOL TARTRATE 6.25 MG: 25 TABLET, FILM COATED ORAL at 21:50

## 2023-07-19 RX ADMIN — ACETAMINOPHEN 650 MG: 325 TABLET ORAL at 19:56

## 2023-07-19 RX ADMIN — ATORVASTATIN CALCIUM 40 MG: 40 TABLET, FILM COATED ORAL at 21:50

## 2023-07-19 RX ADMIN — OXYCODONE HYDROCHLORIDE 5 MG: 5 TABLET ORAL at 13:06

## 2023-07-19 RX ADMIN — IOPAMIDOL 75 ML: 755 INJECTION, SOLUTION INTRAVENOUS at 16:38

## 2023-07-19 RX ADMIN — PRAMIPEXOLE DIHYDROCHLORIDE 0.25 MG: 0.25 TABLET ORAL at 21:49

## 2023-07-19 RX ADMIN — OXYCODONE HYDROCHLORIDE 5 MG: 5 TABLET ORAL at 21:50

## 2023-07-19 RX ADMIN — Medication 10 ML: at 21:50

## 2023-07-19 RX ADMIN — POLYETHYLENE GLYCOL 3350 17 G: 17 POWDER, FOR SOLUTION ORAL at 22:08

## 2023-07-19 ASSESSMENT — PAIN SCALES - GENERAL
PAINLEVEL_OUTOF10: 0
PAINLEVEL_OUTOF10: 3
PAINLEVEL_OUTOF10: 6
PAINLEVEL_OUTOF10: 6
PAINLEVEL_OUTOF10: 8

## 2023-07-19 ASSESSMENT — PAIN DESCRIPTION - ORIENTATION
ORIENTATION: MID
ORIENTATION: MID
ORIENTATION: RIGHT

## 2023-07-19 ASSESSMENT — PAIN DESCRIPTION - LOCATION
LOCATION: CHEST
LOCATION: CHEST;LEG

## 2023-07-19 ASSESSMENT — PAIN DESCRIPTION - DESCRIPTORS
DESCRIPTORS: DULL
DESCRIPTORS: PRESSURE
DESCRIPTORS: PRESSURE
DESCRIPTORS: ACHING;SHARP;SPASM

## 2023-07-19 ASSESSMENT — PAIN - FUNCTIONAL ASSESSMENT: PAIN_FUNCTIONAL_ASSESSMENT: 0-10

## 2023-07-19 ASSESSMENT — PAIN DESCRIPTION - FREQUENCY: FREQUENCY: CONTINUOUS

## 2023-07-19 ASSESSMENT — PAIN DESCRIPTION - PAIN TYPE: TYPE: ACUTE PAIN

## 2023-07-19 NOTE — TELEPHONE ENCOUNTER
Pt daughter called and states new order were put in yesterday and would like to know why those tests are needed. Would also like to know results for echo. Please advise.

## 2023-07-19 NOTE — ED PROVIDER NOTES
ED Attending Attestation Note    This patient was seen by the advanced practice provider. I personally saw the patient and performed a substantive portion of the visit including all aspects of the medical decision making. Briefly, 70 y.o. female presents with concern for chest discomfort and shortness of breath. She had a CABG done in May. There is also concern about possibly her gallbladder as well. Patient states that though especially just the fatigue and shortness of breath seems to be very prominent and cannot lift her arms up or do anything even just the drive here was quite exhausting. .     Focused exam:   Gen: 70 y.o. female, NAD  Heart is noted to be regular. Patient is breathing comfortably at rest and talking in full sentences. Patient with bilateral lower extremity edema noted. The Ekg interpreted by me shows  sinus tachycardia, fimk=719    Axis is   Normal  QTc is  normal  Intervals and Durations are unremarkable. ST Segments: nonspecific changes  No significant change from prior EKG dated 6/20/23    Imaging:  XR CHEST (2 VW)    Result Date: 7/19/2023  EXAMINATION: TWO XRAY VIEWS OF THE CHEST 7/19/2023 12:11 pm COMPARISON: 05/26/2023 HISTORY: ORDERING SYSTEM PROVIDED HISTORY: chest pain TECHNOLOGIST PROVIDED HISTORY: Reason for exam:->chest pain Reason for Exam: chest pain. SOB post surgery FINDINGS: The lungs are without acute focal process. There are trace bilateral pleural effusions. The cardiomediastinal silhouette is stable. The osseous structures are stable. Postop changes of CABG are present. Cardiomegaly with trace bilateral pleural effusions. MDM:   Patient presenting with increased shortness of breath as well as some chest discomfort after CABG. EKG showed no acute ischemic changes. No significant elevation of her high-sensitivity troponin. She does have some evidence of fluid overload.   No significant elevation of LFTs or alkaline phosphatase to suggest

## 2023-07-19 NOTE — H&P
Hospital Medicine History & Physical      Date of Admission: 7/19/2023    Date of Service:  Pt seen/examined on 7/19/23     []Admitted to Inpatient with expected LOS greater than two midnights due to medical therapy. [x]Placed in Observation status. Chief Admission Complaint:  chest pain/sob    Presenting Admission History:      70 y.o. female with hx of cad/cabg, PVD(fem bypass in 6/23), htn, hld who presented to [facility] with ongoing sob/cp. Pt has noted chest pain since cabg. She saw Laurel TOMPKINS in office and felt pain was expected. Pt states pain is still ongoing with minimal improvement. Also she notes ongoing SOB(mainly exertional and with talking and if laying flat/on side. She has been sleeping in a recliner due to ongoing chest pain with laying in bed. Family notes o2 sats down to 83-85% range when pt is sob. Around July 4th, pt had fever of 101 and assoc emesis. She also notes ongoing productive cough(greyish sputum with blackish specs). No n/v/diarrhea this past week. Pt saw Dr. Alfredo England on Friday and noted ongoin BLE swelling(and felt cardiac related). Echo was ordered and arranged closer f/u with [facility]. However, pt last night, while sleeping on couch and laying on left side, noted SOB so daughter called cards office today and told to come to [facility] ER given ongoing cp.     ER course: ctpa ordered, CT surg consulted, bcx obtained, asa/oxycodone given      Assessment/Plan:      Current Principal Problem:  Chest pain    Chest pain/SOB-unclear etiology, possibly due to ongoing cardiomyopathy, troponins were low and stable  -CTPA obtained, results pending  -cards consulted, apprec recs  -held aldactone given mildly elevated k  -consider trial low dose lasix, apprec cards recs  -ct surg consulted, apprec rec  -cards consulted, apprec recs    Ischemic cardiomyopathy- appears stable  -on low dose bb,   -held arb given ctpa    Cad - with recent cabg 5/2023  -on asa, plavix, statin, bb    Htn- wnl  -on bb, Alfredo England MD at [address] Left 12/23/2020    PARTIAL FIFTH DIGIT AMPUTATION LEFT FOOT performed by Raj Cain DPM at [address] [facility] Bilateral     FOOT SURGERY Left     HAND SURGERY Left     Trigger finger    HYSTERECTOMY (CERVIX STATUS UNKNOWN)         Medications Prior to Admission:   Prior to Admission medications    Medication Sig Start Date End Date Taking? Authorizing Provider   pantoprazole (PROTONIX) 40 MG tablet Take 1 tablet by mouth daily 7/4/23   Historical Provider, MD   oxyCODONE (ROXICODONE) 5 MG immediate release tablet Take 1 tablet by mouth every 6 hours as needed for Pain for up to 7 days. Intended supply: 7 days.  Take lowest dose possible to manage pain Max Daily Amount: 20 mg 7/14/23 7/21/23  Marisa Funes MD   ferrous sulfate (IRON 325) 325 (65 Fe) MG tablet Take 1 tablet by mouth 2 times daily (with meals) 6/27/23   Marisa Funes MD   atorvastatin (LIPITOR) 40 MG tablet Take 1 tablet by mouth nightly 6/23/23   Drew Bermudez MD   losartan (COZAAR) 25 MG tablet Take 1 tablet by mouth daily 6/23/23   Drew Bermudez MD   spironolactone (ALDACTONE) 25 MG tablet Take 1 tablet by mouth daily 6/23/23   Drew Bermudez MD   metoprolol tartrate (LOPRESSOR) 25 MG tablet Take 0.25 tablets by mouth 2 times daily 6/23/23   Drew Bermudez MD   potassium chloride (KLOR-CON M) 10 MEQ extended release tablet Take 1 tablet by mouth daily  Patient not taking: Reported on 7/14/2023 6/23/23   Drew Bermudez MD   docusate sodium (COLACE) 50 MG capsule Take 2 capsules by mouth daily    Historical Provider, MD   acetaminophen (TYLENOL) 500 MG tablet Take 650 mg by mouth every 6 hours as needed 5/15/23   Historical Provider, MD   Lift Chair Kavin Durán by Does not apply route 6/20/23   Barabara Fothergill, PA   aspirin 81 MG EC tablet Take 1 tablet by mouth daily 5/27/23   Analy Macias, APRN - CNP   pramipexole (MIRAPEX) 0.25 MG tablet Take 1 tablet by mouth nightly    Historical Provider, MD   Multiple

## 2023-07-19 NOTE — ED PROVIDER NOTES
discharge. Cardiovascular:      Rate and Rhythm: Regular rhythm. Tachycardia present. Pulses: Normal pulses. Heart sounds: Normal heart sounds. No murmur heard. No gallop. Pulmonary:      Effort: Pulmonary effort is normal. No respiratory distress. Breath sounds: Normal breath sounds. No stridor. No wheezing, rhonchi or rales. Abdominal:      General: Bowel sounds are normal. There is no distension. Palpations: Abdomen is soft. Tenderness: There is abdominal tenderness (epigastric, RUQ abdominal pain). There is no right CVA tenderness, left CVA tenderness or rebound. Musculoskeletal:         General: Normal range of motion. Cervical back: Normal range of motion. Right lower leg: Edema present. Left lower leg: Edema present. Skin:     General: Skin is warm and dry. Neurological:      General: No focal deficit present. Mental Status: She is alert and oriented to person, place, and time.    Psychiatric:         Mood and Affect: Mood normal.         Behavior: Behavior normal.         DIAGNOSTIC RESULTS   LABS:    Labs Reviewed   CBC WITH AUTO DIFFERENTIAL - Abnormal; Notable for the following components:       Result Value    WBC 11.8 (*)     RBC 3.38 (*)     Hemoglobin 9.5 (*)     Hematocrit 30.0 (*)     RDW 18.9 (*)     Platelets 007 (*)     Neutrophils Absolute 9.8 (*)     All other components within normal limits   COMPREHENSIVE METABOLIC PANEL W/ REFLEX TO MG FOR LOW K - Abnormal; Notable for the following components:    Sodium 134 (*)     Potassium reflex Magnesium 5.4 (*)     Chloride 98 (*)     Glucose 109 (*)     Albumin/Globulin Ratio 0.9 (*)     AST 45 (*)     All other components within normal limits   TROPONIN - Abnormal; Notable for the following components:    Troponin, High Sensitivity 21 (*)     All other components within normal limits   TROPONIN - Abnormal; Notable for the following components:    Troponin, High Sensitivity 19 (*)     All PONV (postoperative nausea and vomiting), PVD (peripheral vascular disease) ([address]), and Unsteady gait. Chronic Conditions affecting Care:     EMERGENCY DEPARTMENT COURSE and DIFFERENTIAL DIAGNOSIS/MDM:   Vitals:    Vitals:    07/19/23 1314 07/19/23 1335 07/19/23 1401 07/19/23 1437   BP:  116/64 113/61 (!) 100/51   Pulse: 94 93 90 88   Resp:  21 18 18   Temp:       TempSrc:       SpO2:  95% 91% 92%   Weight:       Height:           Patient was given the following medications:  Medications   aspirin tablet 325 mg (325 mg Oral Given 7/19/23 1306)   oxyCODONE (ROXICODONE) immediate release tablet 5 mg (5 mg Oral Given 7/19/23 1306)             Is this patient to be included in the SEP-1 Core Measure due to severe sepsis or septic shock? No   Exclusion criteria - the patient is NOT to be included for SEP-1 Core Measure due to:  2+ SIRS criteria are not met    CONSULTS: (Who and What was discussed)  IP CONSULT TO HOSPITALIST  IP CONSULT TO CARDIOTHORACIC SURGERY  Discussion with Other Profesionals : Admitting Team Dr. Amy You admit and Consultant CT surgery KELSY--recommends admission, CT chest PE, added on blood cultures        Records Reviewed : Seraifn Magdaleno MD, PVD --had femoral to anterior tibial bypass surgery on 06/26/23    CABG x 3 LIMA to LAD, SVG to diagonal, SVG to rPDA on 05/15/23  -----      CC/HPI Summary, DDx, ED Course, and Reassessment: This is a 70y.o. 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 who presents to the ED with complaint of chest pain, sob, abdominal pain that has progressively worsened over the past several months after CABG. Vitals upon arrival show wnl. Physical Exam shows as above.     Blood work was done and shows:

## 2023-07-19 NOTE — ED NOTES
Paged Dr. Gael Ramírez @[phone] per Parminder Mendoza PA-C   RE: cabg in may, worsening SOB, chest pain  Dr. Tyrell Sinha will be the responding attending for this patient, but a PA or NP will be sent to talk to the patient and ED PA-C  Repaged cardiothoracic surgery @[phone]  PA from Dr. Duane Sneddon office came down and spoke with patient as well as Parminder Mendoza @[phone]     Michelle Vasquez  07/19/23

## 2023-07-19 NOTE — TELEPHONE ENCOUNTER
Pts daughter, Dada Maradiaga called to inform us that they decided to take pt to [address] [place] [address].  Fyi.

## 2023-07-20 ENCOUNTER — APPOINTMENT (OUTPATIENT)
Dept: CARDIAC CATH/INVASIVE PROCEDURES | Age: 68
DRG: 246 | End: 2023-07-20
Payer: MEDICARE

## 2023-07-20 LAB
ANION GAP SERPL CALCULATED.3IONS-SCNC: 12 MMOL/L (ref 3–16)
BASOPHILS # BLD: 0 K/UL (ref 0–0.2)
BASOPHILS NFR BLD: 0.4 %
BUN SERPL-MCNC: 8 MG/DL (ref 7–20)
CALCIUM SERPL-MCNC: 9 MG/DL (ref 8.3–10.6)
CHLORIDE SERPL-SCNC: 104 MMOL/L (ref 99–110)
CO2 SERPL-SCNC: 24 MMOL/L (ref 21–32)
CREAT SERPL-MCNC: 0.6 MG/DL (ref 0.6–1.2)
DEPRECATED RDW RBC AUTO: 18.8 % (ref 12.4–15.4)
EOSINOPHIL # BLD: 0 K/UL (ref 0–0.6)
EOSINOPHIL NFR BLD: 0.5 %
GFR SERPLBLD CREATININE-BSD FMLA CKD-EPI: >60 ML/MIN/{1.73_M2}
GLUCOSE SERPL-MCNC: 108 MG/DL (ref 70–99)
HCT VFR BLD AUTO: 28.5 % (ref 36–48)
HGB BLD-MCNC: 9.4 G/DL (ref 12–16)
LYMPHOCYTES # BLD: 1.4 K/UL (ref 1–5.1)
LYMPHOCYTES NFR BLD: 16 %
MCH RBC QN AUTO: 29.2 PG (ref 26–34)
MCHC RBC AUTO-ENTMCNC: 32.8 G/DL (ref 31–36)
MCV RBC AUTO: 88.9 FL (ref 80–100)
MONOCYTES # BLD: 0.5 K/UL (ref 0–1.3)
MONOCYTES NFR BLD: 6.3 %
NEUTROPHILS # BLD: 6.5 K/UL (ref 1.7–7.7)
NEUTROPHILS NFR BLD: 76.8 %
PLATELET # BLD AUTO: 408 K/UL (ref 135–450)
PMV BLD AUTO: 7.2 FL (ref 5–10.5)
POTASSIUM SERPL-SCNC: 4.3 MMOL/L (ref 3.5–5.1)
RBC # BLD AUTO: 3.21 M/UL (ref 4–5.2)
SODIUM SERPL-SCNC: 140 MMOL/L (ref 136–145)
WBC # BLD AUTO: 8.5 K/UL (ref 4–11)

## 2023-07-20 PROCEDURE — 85025 COMPLETE CBC W/AUTO DIFF WBC: CPT

## 2023-07-20 PROCEDURE — 6360000002 HC RX W HCPCS: Performed by: INTERNAL MEDICINE

## 2023-07-20 PROCEDURE — 36415 COLL VENOUS BLD VENIPUNCTURE: CPT

## 2023-07-20 PROCEDURE — 1200000000 HC SEMI PRIVATE

## 2023-07-20 PROCEDURE — 93456 R HRT CORONARY ARTERY ANGIO: CPT

## 2023-07-20 PROCEDURE — C1887 CATHETER, GUIDING: HCPCS | Performed by: INTERNAL MEDICINE

## 2023-07-20 PROCEDURE — 85347 COAGULATION TIME ACTIVATED: CPT

## 2023-07-20 PROCEDURE — 80048 BASIC METABOLIC PNL TOTAL CA: CPT

## 2023-07-20 PROCEDURE — 6370000000 HC RX 637 (ALT 250 FOR IP): Performed by: INTERNAL MEDICINE

## 2023-07-20 PROCEDURE — 6360000002 HC RX W HCPCS

## 2023-07-20 PROCEDURE — 2580000003 HC RX 258: Performed by: INTERNAL MEDICINE

## 2023-07-20 PROCEDURE — 2709999900 HC NON-CHARGEABLE SUPPLY: Performed by: INTERNAL MEDICINE

## 2023-07-20 PROCEDURE — C1760 CLOSURE DEV, VASC: HCPCS | Performed by: INTERNAL MEDICINE

## 2023-07-20 PROCEDURE — 99024 POSTOP FOLLOW-UP VISIT: CPT | Performed by: NURSE PRACTITIONER

## 2023-07-20 PROCEDURE — C1894 INTRO/SHEATH, NON-LASER: HCPCS | Performed by: INTERNAL MEDICINE

## 2023-07-20 PROCEDURE — C1769 GUIDE WIRE: HCPCS | Performed by: INTERNAL MEDICINE

## 2023-07-20 PROCEDURE — G0378 HOSPITAL OBSERVATION PER HR: HCPCS

## 2023-07-20 PROCEDURE — 2500000003 HC RX 250 WO HCPCS

## 2023-07-20 RX ORDER — OXYCODONE HYDROCHLORIDE AND ACETAMINOPHEN 5; 325 MG/1; MG/1
1 TABLET ORAL ONCE
Status: COMPLETED | OUTPATIENT
Start: 2023-07-20 | End: 2023-07-20

## 2023-07-20 RX ORDER — MORPHINE SULFATE 2 MG/ML
2 INJECTION, SOLUTION INTRAMUSCULAR; INTRAVENOUS ONCE
Status: COMPLETED | OUTPATIENT
Start: 2023-07-20 | End: 2023-07-20

## 2023-07-20 RX ORDER — ONDANSETRON 2 MG/ML
4 INJECTION INTRAMUSCULAR; INTRAVENOUS EVERY 6 HOURS PRN
Status: DISCONTINUED | OUTPATIENT
Start: 2023-07-20 | End: 2023-07-20 | Stop reason: SDUPTHER

## 2023-07-20 RX ORDER — SODIUM CHLORIDE 9 MG/ML
INJECTION, SOLUTION INTRAVENOUS PRN
Status: DISCONTINUED | OUTPATIENT
Start: 2023-07-20 | End: 2023-07-22 | Stop reason: HOSPADM

## 2023-07-20 RX ORDER — SODIUM CHLORIDE 0.9 % (FLUSH) 0.9 %
5-40 SYRINGE (ML) INJECTION EVERY 12 HOURS SCHEDULED
Status: DISCONTINUED | OUTPATIENT
Start: 2023-07-20 | End: 2023-07-22 | Stop reason: HOSPADM

## 2023-07-20 RX ORDER — MIDAZOLAM HYDROCHLORIDE 1 MG/ML
INJECTION INTRAMUSCULAR; INTRAVENOUS
Status: COMPLETED | OUTPATIENT
Start: 2023-07-20 | End: 2023-07-20

## 2023-07-20 RX ORDER — SODIUM CHLORIDE 0.9 % (FLUSH) 0.9 %
5-40 SYRINGE (ML) INJECTION PRN
Status: DISCONTINUED | OUTPATIENT
Start: 2023-07-20 | End: 2023-07-22 | Stop reason: HOSPADM

## 2023-07-20 RX ORDER — FUROSEMIDE 20 MG/1
20 TABLET ORAL DAILY
Status: DISCONTINUED | OUTPATIENT
Start: 2023-07-20 | End: 2023-07-22 | Stop reason: HOSPADM

## 2023-07-20 RX ORDER — ACETAMINOPHEN 325 MG/1
650 TABLET ORAL EVERY 4 HOURS PRN
Status: DISCONTINUED | OUTPATIENT
Start: 2023-07-20 | End: 2023-07-21 | Stop reason: SDUPTHER

## 2023-07-20 RX ORDER — FENTANYL CITRATE 50 UG/ML
INJECTION, SOLUTION INTRAMUSCULAR; INTRAVENOUS
Status: COMPLETED | OUTPATIENT
Start: 2023-07-20 | End: 2023-07-20

## 2023-07-20 RX ORDER — LORAZEPAM 0.5 MG/1
0.5 TABLET ORAL
Status: ACTIVE | OUTPATIENT
Start: 2023-07-20 | End: 2023-07-21

## 2023-07-20 RX ADMIN — FENTANYL CITRATE 25 MCG: 50 INJECTION, SOLUTION INTRAMUSCULAR; INTRAVENOUS at 11:45

## 2023-07-20 RX ADMIN — FERROUS SULFATE TAB 325 MG (65 MG ELEMENTAL FE) 325 MG: 325 (65 FE) TAB at 17:24

## 2023-07-20 RX ADMIN — Medication 10 ML: at 20:57

## 2023-07-20 RX ADMIN — METOPROLOL TARTRATE 6.25 MG: 25 TABLET, FILM COATED ORAL at 20:57

## 2023-07-20 RX ADMIN — ACETAMINOPHEN 650 MG: 325 TABLET ORAL at 04:44

## 2023-07-20 RX ADMIN — CLOPIDOGREL BISULFATE 75 MG: 75 TABLET ORAL at 08:24

## 2023-07-20 RX ADMIN — FENTANYL CITRATE 25 MCG: 50 INJECTION, SOLUTION INTRAMUSCULAR; INTRAVENOUS at 12:14

## 2023-07-20 RX ADMIN — OXYCODONE HYDROCHLORIDE AND ACETAMINOPHEN 1 TABLET: 5; 325 TABLET ORAL at 13:24

## 2023-07-20 RX ADMIN — MORPHINE SULFATE 2 MG: 2 INJECTION, SOLUTION INTRAMUSCULAR; INTRAVENOUS at 17:24

## 2023-07-20 RX ADMIN — FUROSEMIDE 20 MG: 20 TABLET ORAL at 13:24

## 2023-07-20 RX ADMIN — OXYCODONE HYDROCHLORIDE AND ACETAMINOPHEN 1 TABLET: 5; 325 TABLET ORAL at 18:41

## 2023-07-20 RX ADMIN — OXYCODONE HYDROCHLORIDE 5 MG: 5 TABLET ORAL at 20:57

## 2023-07-20 RX ADMIN — MIDAZOLAM 0.5 MG: 1 INJECTION INTRAMUSCULAR; INTRAVENOUS at 12:14

## 2023-07-20 RX ADMIN — ASPIRIN 81 MG: 81 TABLET, COATED ORAL at 08:24

## 2023-07-20 RX ADMIN — PRAMIPEXOLE DIHYDROCHLORIDE 0.25 MG: 0.25 TABLET ORAL at 20:57

## 2023-07-20 RX ADMIN — FERROUS SULFATE TAB 325 MG (65 MG ELEMENTAL FE) 325 MG: 325 (65 FE) TAB at 08:24

## 2023-07-20 RX ADMIN — ATORVASTATIN CALCIUM 40 MG: 40 TABLET, FILM COATED ORAL at 20:57

## 2023-07-20 RX ADMIN — DOCUSATE SODIUM 100 MG: 100 CAPSULE, LIQUID FILLED ORAL at 08:24

## 2023-07-20 RX ADMIN — PANTOPRAZOLE SODIUM 40 MG: 40 TABLET, DELAYED RELEASE ORAL at 08:24

## 2023-07-20 RX ADMIN — MIDAZOLAM 0.5 MG: 1 INJECTION INTRAMUSCULAR; INTRAVENOUS at 11:45

## 2023-07-20 ASSESSMENT — PAIN DESCRIPTION - PAIN TYPE: TYPE: ACUTE PAIN

## 2023-07-20 ASSESSMENT — PAIN DESCRIPTION - ORIENTATION
ORIENTATION: MID
ORIENTATION: RIGHT;LEFT
ORIENTATION: RIGHT
ORIENTATION: MID
ORIENTATION: RIGHT;LEFT;MID
ORIENTATION_2: LOWER;RIGHT

## 2023-07-20 ASSESSMENT — PAIN DESCRIPTION - INTENSITY: RATING_2: 5

## 2023-07-20 ASSESSMENT — PAIN DESCRIPTION - DESCRIPTORS
DESCRIPTORS: ACHING;DISCOMFORT
DESCRIPTORS_2: PRESSURE
DESCRIPTORS: ACHING;DISCOMFORT
DESCRIPTORS: ACHING;DISCOMFORT

## 2023-07-20 ASSESSMENT — PAIN DESCRIPTION - LOCATION
LOCATION_2: CHEST
LOCATION: BACK
LOCATION: CHEST;LEG
LOCATION: BACK;CHEST

## 2023-07-20 ASSESSMENT — PAIN SCALES - GENERAL
PAINLEVEL_OUTOF10: 6
PAINLEVEL_OUTOF10: 5
PAINLEVEL_OUTOF10: 9
PAINLEVEL_OUTOF10: 4
PAINLEVEL_OUTOF10: 9
PAINLEVEL_OUTOF10: 8

## 2023-07-20 ASSESSMENT — PAIN - FUNCTIONAL ASSESSMENT: PAIN_FUNCTIONAL_ASSESSMENT: ACTIVITIES ARE NOT PREVENTED

## 2023-07-20 NOTE — PROGRESS NOTES
Pt back from cath lab, IJ and fem site free of bleeding, hematoma, swelling or drainage. Pt educated on laying supine until [phone], pt verbalizes understanding. Call light within reach, VSS.

## 2023-07-20 NOTE — CONSULTS
[facility]        CHIEF COMPLAINT  Shortness of breath, CAD      HISTORY OF PRESENTING ILLNESS  Lilian Linder is a 70 y.o. patient with a history of hypertension and CAD, ischemic cardiomyopathy who is well-known to me from a recent hospitalization when she was diagnosed with HFrEF and multivessel CAD and underwent CABG x 3 in May of this year is now presenting to the hospital with complaints of progressive shortness of breath and chest pain. I have been asked to provide consultation regarding further management and testing. She reports symptoms have persisted pretty much throughout the last 2 months since her CABG. She does not recall being without shortness of breath since her surgery. She she recalls having more severe chest pain in the earlier weeks since CABG but lately it has been exertional shortness of breath that has been the most bothersome symptom. She is very limited in her functional capacity and ability to walk around the house. She gets short of breath even walking to the bathroom. Chest pain she describes as more epigastric upper abdominal pain. Lately she has been noticing slightly worsening swelling in her lower extremities as well as abdominal distention. Following CABG, during outpatient CT surgery follow-up, she was noted to have slow healing of her sternotomy but there were no signs of infection or dehiscence. While shortness of breath is similar to her symptoms prior to CABG, the chest pain she describes is somewhat different. In addition to above symptoms, she denies orthopnea, palpitations, lightheadedness, syncope. She remains compliant with all prescribed medications. She has not started cardiac rehab yet. Follow-up echo done last week shows persistently low ejection fraction in the severe range.       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 Diastolic filling parameters suggest grade II diastolic dysfunction with   elevated filling pressures. Normal right ventricular size with reduced function. Biatrial enlargement. Mild mitral regurgitation. .   Mild tricuspid regurgitation. Estimated pulmonary artery systolic pressure is 48 mmHg assuming a right   atrial pressure of 15 mmHg. There is a circumferential pericardial effusion noted, moderate in size   posteriorly - measuring up to 1.5 cm. No evidence of chamber collapse. IVC plethora is noted. Stress - n/a    Cath - 5/2023  LM Ostial/prox 40-50% stenosis (eccentric, best visualized in caudal views), Less than 10% mid-distal stenosis            LAD Aneursymal with prox-mid 80% stenosis. Distal 40% stenosis. D1 is medium to large vessel with ostial/prox 60% stenosis. LCX Prox-mid 80-90% stenosis. RI 10-20% [id number] stenosis          RCA Dominant, stented in prox-mid segments with 80% prox stenosis and 100% mid-distal  with well developed L-R collaterals. High complexity/medical decision making due to extensive data review, extensive history review, independent review of data. High risk due to acute illness, evaluation of drug-drug interactions, medication management and diagnostic interventions. ASSESSMENT & PLAN:    Shortness of breath - concerning for acute on chronic systolic heart failure given persistently low EF as well as slight worsening of lower extremity edema and abdominal distention versus unstable angina versus both. BNP elevated. Atypical chest pain - lower chest and upper abdominal pain.   No signs of acute abdomen on exam.  Atypical for angina and likely of GI origin  Multivessel CAD s/p recent CABG x 3  HFrEF/ischemic cardiomyopathy - EF remains severely low in the 25% range  Pericardial fusion - small to moderate, no physical, hemodynamic, or echocardiographic signs of tamponade    Right

## 2023-07-20 NOTE — PROGRESS NOTES
Rt groin site started bleeding through dressing. Writer held manual pressure for 15 minutes and then called Cath Lab for further instruction. Cath lab directed to hold pressure for another 15 minutes and then call back if still bleeding. Another 15 minutes of manual pressure held and bleeding stopped. Site redressed, no hematoma present, pt instructed to lay flat for another hour. 1648: Pt bleeding through dressing, redressed x4 and still bleeding after pressure. Cath Lab called again and they are sending someone up to assess site.

## 2023-07-20 NOTE — ACP (ADVANCE CARE PLANNING)
Advance Care Planning     General Advance Care Planning (ACP) Conversation    Date of Conversation: 7/19/2023  Conducted with: Patient with Decision Making Capacity    Healthcare Decision Maker:    Primary Decision Maker: [address] [facility] [address] - [phone]    Secondary Decision Maker: Pamela Latif - Child - [phone]    Supplemental (Other) Decision Maker: Jorge Mann - Child - [phone]    Supplemental (Other) Decision Maker: Razia Guerrier - Child - [phone]  Click here to complete Healthcare Decision Makers including selection of the Healthcare Decision Maker Relationship (ie \"Primary\"). Today we documented Decision Maker(s) consistent with Legal Next of Kin hierarchy.     Content/Action Overview:  Has NO ACP documents/care preferences - information provided, considering goals and options  Remains full code    Length of Voluntary ACP Conversation in minutes:  <16 minutes (Non-Billable)    Marivel Estrada RN

## 2023-07-20 NOTE — PROCEDURES
CARDIAC CATHETERIZATION REPORT      Procedure Date:  2023  Patient Name: Jadiel Dela Cruz  MRN: [id number]      : 1955        : Guille Leavitt MD     PROCEDURES PERFORMED  Coronary angiogram via right femoral approach, access closed with Perclose  Right heart cath via right IJ approach  Moderate sedation 15 min CPT 51766 (Fentanyl: 50 mcg, Versed: 1 mg)  Sedation start time: 1137  Sedation end time: 2500 [facility] guidance for vascular access CPT 08015        INDICATION  Unstable angina, acute on chronic systolic heart failure     PROCEDURE DESCRIPTION  Risks/benefits/alternatives/outcomes were discussed with patient and/or family in detail and informed consent was obtained. Patient was prepped and draped in the usual sterile fashion. Versed and fentanyl were used for conscious sedation. Local anesthesia was applied over right neck for IJ puncture. Access was obtained and a 5 Anguillan sheath was inserted under ultrasound and fluoroscopic guidance. A 5 Anguillan [place] catheter was advanced to RA, RV, pulmonary artery and wedge position and multiple pressure measurements and O2 sats were obtained. Following this, local anaesthetic was applied over right femoral puncture site. Using a modified Seldinger technique under ultrasound guidance, the right common femoral artery was selectively cannulated and a 6Fr sheath was inserted into right common femoral artery. Diagnostic 6Fr JL4 and JR4 were used to engage left and right coronary arteries respectively and to obtain angiogram.  JR4 catheter was also used to engage SVG's and LIMA grafts. At the conclusion of the procedure, there were no immediate complications. I supervised the sedation with fentanyl and midazolam. An independent trained observer pushed ByteShield at my direction. We monitored the patient's level of consciousness and vital signs/physiologic status throughout the procedure duration.  At the end of the procedure, the femoral sheath was removed

## 2023-07-20 NOTE — PROGRESS NOTES
Hospital Medicine Progress Note      Date of Admission: 7/19/2023  Hospital Day: 2    Chief Admission Complaint:  cp/sob     Subjective:   no signif sob currently, ongoing cp, family at bedside    Presenting Admission History:       70 y.o. female with hx of cad/cabg, PVD(fem bypass in 6/23), htn, hld who presented to [facility] with ongoing sob/cp. Pt has noted chest pain since cabg. She saw Liana TOMPKINS in office and felt pain was expected. Pt states pain is still ongoing with minimal improvement. Also she notes ongoing SOB(mainly exertional and with talking and if laying flat/on side. She has been sleeping in a recliner due to ongoing chest pain with laying in bed. Family notes o2 sats down to 83-85% range when pt is sob. Around July 4th, pt had fever of 101 and assoc emesis. She also notes ongoing productive cough(greyish sputum with blackish specs). No n/v/diarrhea this past week. Pt saw Dr. Ofelia Martinez on Friday and noted ongoin BLE swelling(and felt cardiac related). Echo was ordered and arranged closer f/u with cards. However, pt last night, while sleeping on couch and laying on left side, noted SOB so daughter called cards office today and told to come to [facility] ER given ongoing cp.      ER course: ctpa ordered, CT surg consulted, bcx obtained, asa/oxycodone given       Assessment/Plan:      Current Principal Problem:  Chest pain    Chest pain/SOB-unclear etiology, possibly due to ongoing cardiomyopathy, troponins were low and stable  -CTPA obtained, neg for PE  -cards consulted, apprec recs  -held aldactone given mildly elevated k  -consider trial low dose lasix, apprec cards recs  -ct surg consulted, apprec rec  -cards consulted, apprec recs     Ischemic cardiomyopathy- appears stable  -on low dose bb,   -held arb given ctpa     Cad - with recent cabg 5/2023  -on asa, plavix, statin, bb     Htn- wnl  -on bb,   -held arb due to ctpa     Hld-mgmt per outpt  -on statin     Dock Flaquito- defer to outpt mmgt  -on ppi

## 2023-07-20 NOTE — PRE SEDATION
Continuous Infusions:    sodium chloride      sodium chloride      sodium chloride       PRN Meds: sodium chloride flush, sodium chloride, LORazepam, sodium chloride flush, sodium chloride, acetaminophen, sodium chloride flush, sodium chloride, ondansetron **OR** ondansetron, polyethylene glycol  Home Meds:   Prior to Admission medications    Medication Sig Start Date End Date Taking? Authorizing Provider   pantoprazole (PROTONIX) 40 MG tablet Take 1 tablet by mouth daily 7/4/23   Historical Provider, MD   oxyCODONE (ROXICODONE) 5 MG immediate release tablet Take 1 tablet by mouth every 6 hours as needed for Pain for up to 7 days. Intended supply: 7 days.  Take lowest dose possible to manage pain Max Daily Amount: 20 mg 7/14/23 7/21/23  Silvia Da Silva MD   ferrous sulfate (IRON 325) 325 (65 Fe) MG tablet Take 1 tablet by mouth 2 times daily (with meals) 6/27/23   Silvia Da Silva MD   atorvastatin (LIPITOR) 40 MG tablet Take 1 tablet by mouth nightly 6/23/23   Agnes Lanier MD   losartan (COZAAR) 25 MG tablet Take 1 tablet by mouth daily 6/23/23   Agnes Lanier MD   spironolactone (ALDACTONE) 25 MG tablet Take 1 tablet by mouth daily 6/23/23   Agnes Lanier MD   metoprolol tartrate (LOPRESSOR) 25 MG tablet Take 0.25 tablets by mouth 2 times daily 6/23/23   Agnes Lanier MD   potassium chloride (KLOR-CON M) 10 MEQ extended release tablet Take 1 tablet by mouth daily  Patient not taking: Reported on 7/14/2023 6/23/23   Agnes Lanier MD   docusate sodium (COLACE) 50 MG capsule Take 2 capsules by mouth daily    Historical Provider, MD   acetaminophen (TYLENOL) 500 MG tablet Take 650 mg by mouth every 6 hours as needed 5/15/23   Historical Provider, MD   Lift Chair Kavin Durán by Does not apply route 6/20/23   TURNER Alvarez   aspirin 81 MG EC tablet Take 1 tablet by mouth daily 5/27/23   VEL Jackson - CNP   pramipexole (MIRAPEX) 0.25 MG tablet Take 1 tablet by mouth nightly    Historical Provider, MD   Multiple Vitamins-Minerals (MULTIVITAMIN ADULTS PO) Take by mouth    Historical Provider, MD   clopidogrel (PLAVIX) 75 MG tablet Take 1 tablet by mouth daily    Historical Provider, MD     Coumadin Use Last 7 Days:  No  Antiplatelet drug therapy use last 7 days: Yes - Aspirin  Other anticoagulant use last 7 days: No  Additional Medication Information:  No      Pre-Sedation Documentation and Exam:   I have personally completed a history, physical exam & review of systems for this patient (see notes). Vital signs have been reviewed (see flow sheet for vitals). I have reviewed the patient's history and review of systems.     Mallampati Airway Assessment:  Mallampati Class II - (soft palate, fauces & uvula are visible)    Prior History of Anesthesia Complications:   none    ASA Classification:  Class 2    Sedation/ Anesthesia Plan:   intravenous sedation    Medications Planned:   midazolam (Versed) intravenously and fentanyl intravenously    Patient is an appropriate candidate for plan of sedation: yes    Electronically signed by Ehsan Bolaños MD on 7/20/2023 at 5:55 PM

## 2023-07-20 NOTE — PROGRESS NOTES
Occupational Therapy  OT order received. Pt is at Cath Lab. OT will reattempt 7/21/23.   Winter Patel OT

## 2023-07-20 NOTE — PROGRESS NOTES
4 Eyes Skin Assessment     The patient is being assess for  Admission    I agree that 2 RN's have performed a thorough Head to Toe Skin Assessment on the patient. ALL assessment sites listed below have been assessed. Areas assessed by both nurses:   [x]   Head, Face, and Ears   [x]   Shoulders, Back, and Chest-healing mid sternal surgical incision site  [x]   Arms, Elbows, and Hands   [x]   Coccyx, Sacrum, and Ischum  [x]   Legs, Feet, and Heels- healing bilat LE surgical incision sites        Does the Patient have Skin Breakdown?   No         Sushant Prevention initiated:  Yes   Wound Care Orders initiated:  No      [facility] nurse consulted for Pressure Injury (Stage 3,4, Unstageable, DTI, NWPT, and Complex wounds):  NA      Nurse 1 eSignature: Electronically signed by Bette Garcia RN on 7/19/23 at 9:45 PM EDT    **SHARE this note so that the co-signing nurse is able to place an eSignature**    Nurse 2 eSignature: {Esignature:[id number]}

## 2023-07-20 NOTE — CARE COORDINATION
Case Management Assessment  Initial Evaluation    Date/Time of Evaluation: 7/20/2023 10:02 AM  Assessment Completed by: Hira Medrano RN    If patient is discharged prior to next notation, then this note serves as note for discharge by case management. Patient Name: Yeyo Goddard                   YOB: 1955  Diagnosis: Chest pain [R07.9]  Dyspnea and respiratory abnormalities [R06.00, R06.89]  Chest pain, unspecified type [R07.9]                   Date / Time: 7/19/2023 11:55 AM    Patient Admission Status: Observation   Readmission Risk (Low < 19, Mod (19-27), High > 27): Readmission Risk Score: 19.6    Current PCP: Kinjal Morales MD  PCP verified by ? Yes    Chart Reviewed: Yes      History Provided by: Patient  Patient Orientation: Alert and Oriented    Patient Cognition: Alert    Hospitalization in the last 30 days (Readmission):  Yes    If yes, Readmission Assessment in CM Navigator will be completed. Advance Directives:      Code Status: Full Code   Patient's Primary Decision Maker is: Legal Next of Kin    Primary Decision Maker: [address] [place] [address] [phone]    Secondary Decision Maker: Ingridananth Solares  Child  [phone]    Supplemental (Other) Decision Maker: Kenan Betancourt - Child  [phone]    Supplemental (Other) Decision Maker: Richmond Hayes Child - [phone]    Discharge Planning:    Patient lives with: Spouse/Significant Other Type of Home: House  Primary Care Giver: Self  Patient Support Systems include: Spouse/Significant Other   Current Financial resources: Medicare  Current community resources: None  Current services prior to admission: Durable Medical Equipment            Current DME: Oxygen Therapy (Comment) ([facility])            Type of Home Care services:  None    ADLS  Prior functional level:  Independent in ADLs/IADLs  Current functional level: Assistance with the following:, Mobility    PT AM-PAC:   /24  OT AM-PAC:   /24    Family can provide assistance at DC: Yes  Would you like Case Management to discuss the discharge plan with any other family members/significant others, and if so, who? Yes (dtrs)  Plans to Return to Present Housing: Yes  Other Identified Issues/Barriers to RETURNING to current housin story  Potential Assistance needed at discharge: Other (Comment) (Cardiac Rehab)            Potential DME:    Patient expects to discharge to: [address] [place] [address] for transportation at discharge: Family    Financial    Payor: MEDICARE / Plan: MEDICARE PART A AND B / Product Type: *No Product type* /     Does insurance require precert for SNF: No    Potential assistance Purchasing Medications: No  Meds-to-Beds request: Yes      [id number] [place], [place] - [address] - P [phone] - F [phone]  [address] [place] [address]  Phone: [phone] Fax: [phone]      Notes:    Factors facilitating achievement of predicted outcomes: Family support    Barriers to discharge: Stairs at home    Additional Case Management Notes: Pt is from home  w spouse. S/P CABG in May, has had other admissions also for vasc. Has 02  w [facility]- prn only. Prev had [facility]- no longer active. Pt and family at bedside express frustration- feel as tho pt performance status is declining. Want OP rehab. Per chart review- pt had an order for cardiac Rehab written  post op CABG. Call to [facility]- discussed and state spt would be appropriate for that step at this time. Call to [address]- they will call pt on Mon to initiate service. Discussed w pt and family- in agreement and provided  w contact number [phone]. Denies other needs at this time.      The Plan for Transition of Care is related to the following treatment goals of Chest pain [R07.9]  Dyspnea and respiratory abnormalities [R06.00, R06.89]  Chest pain, unspecified type [U45.2]    IF APPLICABLE: The Patient and/or patient representative Tiffanie David and her family were provided with a

## 2023-07-20 NOTE — PROGRESS NOTES
8:21 PM  Report rec'd from ED RN. Patient being admitted for ongoing CP/SOB with exertion. CTS already following. Hx of recent CABG and tib bypass. Patient will come to the unit on tele. Chris Palacios RN    8:50 PM  Patient admitted to room 436 from ED. Daughter Erinn Bennett at bedside. Patient oriented to room, call light, bed rails, phone, lights and bathroom. Telemetry box in place, patient aware of placement and reason. Verified with CMU. Bed locked, in lowest position, side rails up 2/4, call light within reach. Will continue to monitor. Chris Palacios RN    9:22 PM  Shift assessment complete. (See findings in flowsheet). VSS. Patient with no complaints at this time.  Chris Palacios RN

## 2023-07-21 ENCOUNTER — APPOINTMENT (OUTPATIENT)
Dept: VASCULAR LAB | Age: 68
DRG: 246 | End: 2023-07-21
Payer: MEDICARE

## 2023-07-21 ENCOUNTER — APPOINTMENT (OUTPATIENT)
Dept: CARDIAC CATH/INVASIVE PROCEDURES | Age: 68
DRG: 246 | End: 2023-07-21
Payer: MEDICARE

## 2023-07-21 PROBLEM — Z86.79 HISTORY OF CORONARY ARTERY DISEASE: Status: ACTIVE | Noted: 2023-07-21

## 2023-07-21 LAB
ANION GAP SERPL CALCULATED.3IONS-SCNC: 13 MMOL/L (ref 3–16)
BUN SERPL-MCNC: 8 MG/DL (ref 7–20)
CALCIUM SERPL-MCNC: 9.3 MG/DL (ref 8.3–10.6)
CHLORIDE SERPL-SCNC: 102 MMOL/L (ref 99–110)
CO2 SERPL-SCNC: 25 MMOL/L (ref 21–32)
CREAT SERPL-MCNC: 0.6 MG/DL (ref 0.6–1.2)
DEPRECATED RDW RBC AUTO: 18.8 % (ref 12.4–15.4)
EKG ATRIAL RATE: 92 BPM
EKG DIAGNOSIS: NORMAL
EKG P AXIS: 60 DEGREES
EKG P-R INTERVAL: 178 MS
EKG Q-T INTERVAL: 418 MS
EKG QRS DURATION: 92 MS
EKG QTC CALCULATION (BAZETT): 516 MS
EKG R AXIS: 100 DEGREES
EKG T AXIS: 39 DEGREES
EKG VENTRICULAR RATE: 92 BPM
GFR SERPLBLD CREATININE-BSD FMLA CKD-EPI: >60 ML/MIN/{1.73_M2}
GLUCOSE SERPL-MCNC: 119 MG/DL (ref 70–99)
HCT VFR BLD AUTO: 28.9 % (ref 36–48)
HGB BLD-MCNC: 9.4 G/DL (ref 12–16)
MCH RBC QN AUTO: 28.4 PG (ref 26–34)
MCHC RBC AUTO-ENTMCNC: 32.5 G/DL (ref 31–36)
MCV RBC AUTO: 87.5 FL (ref 80–100)
PLATELET # BLD AUTO: 434 K/UL (ref 135–450)
PMV BLD AUTO: 7 FL (ref 5–10.5)
POTASSIUM SERPL-SCNC: 4.3 MMOL/L (ref 3.5–5.1)
RBC # BLD AUTO: 3.31 M/UL (ref 4–5.2)
SODIUM SERPL-SCNC: 140 MMOL/L (ref 136–145)
WBC # BLD AUTO: 13.1 K/UL (ref 4–11)

## 2023-07-21 PROCEDURE — C9600 PERC DRUG-EL COR STENT SING: HCPCS

## 2023-07-21 PROCEDURE — C1725 CATH, TRANSLUMIN NON-LASER: HCPCS | Performed by: INTERNAL MEDICINE

## 2023-07-21 PROCEDURE — 6370000000 HC RX 637 (ALT 250 FOR IP): Performed by: INTERNAL MEDICINE

## 2023-07-21 PROCEDURE — C1769 GUIDE WIRE: HCPCS | Performed by: INTERNAL MEDICINE

## 2023-07-21 PROCEDURE — B241ZZ3 ULTRASONOGRAPHY OF MULTIPLE CORONARY ARTERIES, INTRAVASCULAR: ICD-10-PCS | Performed by: INTERNAL MEDICINE

## 2023-07-21 PROCEDURE — 2580000003 HC RX 258: Performed by: INTERNAL MEDICINE

## 2023-07-21 PROCEDURE — C1753 CATH, INTRAVAS ULTRASOUND: HCPCS | Performed by: INTERNAL MEDICINE

## 2023-07-21 PROCEDURE — 6360000002 HC RX W HCPCS: Performed by: INTERNAL MEDICINE

## 2023-07-21 PROCEDURE — 94761 N-INVAS EAR/PLS OXIMETRY MLT: CPT

## 2023-07-21 PROCEDURE — 80048 BASIC METABOLIC PNL TOTAL CA: CPT

## 2023-07-21 PROCEDURE — 2100000000 HC CCU R&B

## 2023-07-21 PROCEDURE — 85347 COAGULATION TIME ACTIVATED: CPT

## 2023-07-21 PROCEDURE — B2111ZZ FLUOROSCOPY OF MULTIPLE CORONARY ARTERIES USING LOW OSMOLAR CONTRAST: ICD-10-PCS | Performed by: INTERNAL MEDICINE

## 2023-07-21 PROCEDURE — 93005 ELECTROCARDIOGRAM TRACING: CPT | Performed by: INTERNAL MEDICINE

## 2023-07-21 PROCEDURE — 85027 COMPLETE CBC AUTOMATED: CPT

## 2023-07-21 PROCEDURE — C1874 STENT, COATED/COV W/DEL SYS: HCPCS | Performed by: INTERNAL MEDICINE

## 2023-07-21 PROCEDURE — 93010 ELECTROCARDIOGRAM REPORT: CPT | Performed by: INTERNAL MEDICINE

## 2023-07-21 PROCEDURE — 92978 ENDOLUMINL IVUS OCT C 1ST: CPT

## 2023-07-21 PROCEDURE — C1894 INTRO/SHEATH, NON-LASER: HCPCS | Performed by: INTERNAL MEDICINE

## 2023-07-21 PROCEDURE — 6360000002 HC RX W HCPCS

## 2023-07-21 PROCEDURE — 6370000000 HC RX 637 (ALT 250 FOR IP)

## 2023-07-21 PROCEDURE — 027034Z DILATION OF CORONARY ARTERY, ONE ARTERY WITH DRUG-ELUTING INTRALUMINAL DEVICE, PERCUTANEOUS APPROACH: ICD-10-PCS | Performed by: INTERNAL MEDICINE

## 2023-07-21 PROCEDURE — 2500000003 HC RX 250 WO HCPCS

## 2023-07-21 PROCEDURE — 2060000000 HC ICU INTERMEDIATE R&B

## 2023-07-21 PROCEDURE — C1887 CATHETER, GUIDING: HCPCS | Performed by: INTERNAL MEDICINE

## 2023-07-21 PROCEDURE — 2700000000 HC OXYGEN THERAPY PER DAY

## 2023-07-21 PROCEDURE — 93970 EXTREMITY STUDY: CPT

## 2023-07-21 PROCEDURE — 36415 COLL VENOUS BLD VENIPUNCTURE: CPT

## 2023-07-21 PROCEDURE — 99999 PR OFFICE/OUTPT VISIT,PROCEDURE ONLY: CPT | Performed by: INTERNAL MEDICINE

## 2023-07-21 PROCEDURE — 99024 POSTOP FOLLOW-UP VISIT: CPT | Performed by: NURSE PRACTITIONER

## 2023-07-21 PROCEDURE — 4A023N8 MEASUREMENT OF CARDIAC SAMPLING AND PRESSURE, BILATERAL, PERCUTANEOUS APPROACH: ICD-10-PCS | Performed by: INTERNAL MEDICINE

## 2023-07-21 RX ORDER — SODIUM CHLORIDE 9 MG/ML
INJECTION, SOLUTION INTRAVENOUS PRN
Status: DISCONTINUED | OUTPATIENT
Start: 2023-07-21 | End: 2023-07-22 | Stop reason: HOSPADM

## 2023-07-21 RX ORDER — HEPARIN SODIUM 1000 [USP'U]/ML
INJECTION, SOLUTION INTRAVENOUS; SUBCUTANEOUS
Status: COMPLETED | OUTPATIENT
Start: 2023-07-21 | End: 2023-07-21

## 2023-07-21 RX ORDER — FENTANYL CITRATE 50 UG/ML
INJECTION, SOLUTION INTRAMUSCULAR; INTRAVENOUS
Status: COMPLETED | OUTPATIENT
Start: 2023-07-21 | End: 2023-07-21

## 2023-07-21 RX ORDER — MIDAZOLAM HYDROCHLORIDE 1 MG/ML
INJECTION INTRAMUSCULAR; INTRAVENOUS
Status: COMPLETED | OUTPATIENT
Start: 2023-07-21 | End: 2023-07-21

## 2023-07-21 RX ORDER — ACETAMINOPHEN 325 MG/1
650 TABLET ORAL EVERY 4 HOURS PRN
Status: DISCONTINUED | OUTPATIENT
Start: 2023-07-21 | End: 2023-07-22 | Stop reason: HOSPADM

## 2023-07-21 RX ORDER — SODIUM CHLORIDE 0.9 % (FLUSH) 0.9 %
5-40 SYRINGE (ML) INJECTION EVERY 12 HOURS SCHEDULED
Status: DISCONTINUED | OUTPATIENT
Start: 2023-07-21 | End: 2023-07-22 | Stop reason: HOSPADM

## 2023-07-21 RX ORDER — SODIUM CHLORIDE 0.9 % (FLUSH) 0.9 %
5-40 SYRINGE (ML) INJECTION PRN
Status: DISCONTINUED | OUTPATIENT
Start: 2023-07-21 | End: 2023-07-22 | Stop reason: HOSPADM

## 2023-07-21 RX ADMIN — FENTANYL CITRATE 25 MCG: 50 INJECTION, SOLUTION INTRAMUSCULAR; INTRAVENOUS at 10:22

## 2023-07-21 RX ADMIN — ACETAMINOPHEN 650 MG: 325 TABLET ORAL at 17:06

## 2023-07-21 RX ADMIN — PRAMIPEXOLE DIHYDROCHLORIDE 0.25 MG: 0.25 TABLET ORAL at 20:58

## 2023-07-21 RX ADMIN — Medication 10 ML: at 07:38

## 2023-07-21 RX ADMIN — ATORVASTATIN CALCIUM 40 MG: 40 TABLET, FILM COATED ORAL at 20:58

## 2023-07-21 RX ADMIN — PANTOPRAZOLE SODIUM 40 MG: 40 TABLET, DELAYED RELEASE ORAL at 06:08

## 2023-07-21 RX ADMIN — CLOPIDOGREL BISULFATE 75 MG: 75 TABLET ORAL at 07:57

## 2023-07-21 RX ADMIN — ACETAMINOPHEN 650 MG: 325 TABLET ORAL at 12:31

## 2023-07-21 RX ADMIN — ASPIRIN 81 MG: 81 TABLET, COATED ORAL at 07:59

## 2023-07-21 RX ADMIN — Medication 10 ML: at 20:59

## 2023-07-21 RX ADMIN — HEPARIN SODIUM 6000 UNITS: 1000 INJECTION, SOLUTION INTRAVENOUS; SUBCUTANEOUS at 09:49

## 2023-07-21 RX ADMIN — OXYCODONE HYDROCHLORIDE 5 MG: 5 TABLET ORAL at 20:58

## 2023-07-21 RX ADMIN — MIDAZOLAM HYDROCHLORIDE 1 MG: 1 INJECTION INTRAMUSCULAR; INTRAVENOUS at 09:48

## 2023-07-21 RX ADMIN — FENTANYL CITRATE 25 MCG: 50 INJECTION, SOLUTION INTRAMUSCULAR; INTRAVENOUS at 09:48

## 2023-07-21 RX ADMIN — METOPROLOL TARTRATE 6.25 MG: 25 TABLET, FILM COATED ORAL at 20:58

## 2023-07-21 RX ADMIN — FERROUS SULFATE TAB 325 MG (65 MG ELEMENTAL FE) 325 MG: 325 (65 FE) TAB at 17:06

## 2023-07-21 RX ADMIN — ONDANSETRON 4 MG: 2 INJECTION INTRAMUSCULAR; INTRAVENOUS at 08:20

## 2023-07-21 ASSESSMENT — PAIN DESCRIPTION - LOCATION
LOCATION: LEG
LOCATION: HEAD
LOCATION: LEG
LOCATION: BACK
LOCATION_2: CHEST

## 2023-07-21 ASSESSMENT — PAIN DESCRIPTION - INTENSITY: RATING_2: 1

## 2023-07-21 ASSESSMENT — PAIN SCALES - GENERAL
PAINLEVEL_OUTOF10: 1
PAINLEVEL_OUTOF10: 4
PAINLEVEL_OUTOF10: 3
PAINLEVEL_OUTOF10: 2

## 2023-07-21 ASSESSMENT — PAIN DESCRIPTION - ORIENTATION
ORIENTATION: RIGHT
ORIENTATION: MID
ORIENTATION: RIGHT
ORIENTATION: MID

## 2023-07-21 ASSESSMENT — PAIN DESCRIPTION - DESCRIPTORS
DESCRIPTORS_2: PRESSURE
DESCRIPTORS: ACHING
DESCRIPTORS: ACHING;DISCOMFORT
DESCRIPTORS: ACHING;DISCOMFORT
DESCRIPTORS: ACHING

## 2023-07-21 NOTE — PRE SEDATION
Sedation Pre-Procedure Note    Patient Name: Ronda Calhoun   YOB: 1955  Room/Bed: Cath Pool Rm/NONE  Medical Record Number: [id number]  Date: 7/21/2023   Time: 10:52 AM       Indication: Unstable angina, cardiomyopathy    Consent: I have discussed with the patient and/or the patient representative the indication, alternatives, and the possible risks and/or complications of the planned procedure and the anesthesia methods. The patient and/or patient representative appear to understand and agree to proceed. 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 Past Surgical History:   has a past surgical history that includes Foot surgery (Bilateral); Hand surgery (Left); Hysterectomy; Coronary angioplasty with stent; eye surgery (Left, 07/01/2016); eye surgery (Right, 07/22/2016); Foot surgery (Left); Foot Amputation (Left, 12/23/2020); Coronary artery bypass graft (N/A, 5/19/2023); and Femoral-tibial Bypass Graft (Right, 6/26/2023).     Medications:   Scheduled Meds:    sodium chloride flush  5-40 mL IntraVENous 2 times per day    furosemide  20 mg Oral Daily    sodium chloride flush  5-40 mL IntraVENous 2 times per day    aspirin  81 mg Oral Daily    atorvastatin  40 mg Oral Nightly    clopidogrel  75 mg Oral Daily    docusate sodium  100 mg Oral Daily    ferrous sulfate  325 mg Oral BID WC    metoprolol tartrate  6.25 mg Oral BID    pantoprazole  40 mg Oral QAM AC    pramipexole  0.25 mg Oral Nightly    oxyCODONE  5 mg Oral QHS    sodium chloride flush  5-40 mL IntraVENous 2 times per day    enoxaparin  40 mg SubCUTAneous Daily     Continuous Infusions:    sodium chloride      sodium Provider, MD   clopidogrel (PLAVIX) 75 MG tablet Take 1 tablet by mouth daily    Historical Provider, MD     Coumadin Use Last 7 Days:  No  Antiplatelet drug therapy use last 7 days: Yes - Aspirin  Other anticoagulant use last 7 days: No  Additional Medication Information:  No      Pre-Sedation Documentation and Exam:   I have personally completed a history, physical exam & review of systems for this patient (see notes). Vital signs have been reviewed (see flow sheet for vitals). I have reviewed the patient's history and review of systems.     Mallampati Airway Assessment:  Mallampati Class II - (soft palate, fauces & uvula are visible)    Prior History of Anesthesia Complications:   none    ASA Classification:  Class 2    Sedation/ Anesthesia Plan:   intravenous sedation    Medications Planned:   midazolam (Versed) intravenously and fentanyl intravenously    Patient is an appropriate candidate for plan of sedation: yes    Electronically signed by Ehsan Bolaños MD on 7/21/2023 at 10:52 AM

## 2023-07-21 NOTE — PROGRESS NOTES
Hospital Medicine Progress Note      Date of Admission: 7/19/2023  Hospital Day: 3    Chief Admission Complaint:  cp/sob     Subjective:   nocp/ sob currently,  family at bedside     Presenting Admission History:        70 y.o. female with hx of cad/cabg, PVD(fem bypass in 6/23), htn, hld who presented to [facility] with ongoing sob/cp. Pt has noted chest pain since cabg. She saw Will TOMPKINS in office and felt pain was expected. Pt states pain is still ongoing with minimal improvement. Also she notes ongoing SOB(mainly exertional and with talking and if laying flat/on side. She has been sleeping in a recliner due to ongoing chest pain with laying in bed. Family notes o2 sats down to 83-85% range when pt is sob. Around July 4th, pt had fever of 101 and assoc emesis. She also notes ongoing productive cough(greyish sputum with blackish specs). No n/v/diarrhea this past week. Pt saw Dr. Yovani Franco on Friday and noted ongoin BLE swelling(and felt cardiac related). Echo was ordered and arranged closer f/u with [employer]. However, pt last night, while sleeping on couch and laying on left side, noted SOB so daughter called cards office today and told to come to [facility] ER given ongoing cp.      ER course: ctpa ordered, CT surg consulted, bcx obtained, asa/oxycodone given       Assessment/Plan:      Current Principal Problem:  Chest pain    Chest pain/SOB-unclear etiology, possibly due to ongoing cardiomyopathy, troponins were low and stable  -CTPA obtained, neg for PE  -cards consulted, apprec recs  -held aldactone given mildly elevated k  -consider trial low dose lasix, apprec cards recs  -ct surg consulted, apprec rec  -cards consulted, apprec recs   -LHC done 7/20(noted stenosis of LIMA to LAD graft and LAD) and 7/21(PCI/MICHAEL to LAD)    Ischemic cardiomyopathy- appears stable  -on low dose bb,   -held arb given ctpa     Cad - with recent cabg 5/2023  -on asa, plavix, statin, bb     PVD- with hx of bypass surgery by  Daily    ferrous sulfate  325 mg Oral BID WC    metoprolol tartrate  6.25 mg Oral BID    pantoprazole  40 mg Oral QAM AC    pramipexole  0.25 mg Oral Nightly    oxyCODONE  5 mg Oral QHS    sodium chloride flush  5-40 mL IntraVENous 2 times per day    enoxaparin  40 mg SubCUTAneous Daily     PRN Meds: sodium chloride flush, sodium chloride, acetaminophen, sodium chloride flush, sodium chloride, sodium chloride flush, sodium chloride, sodium chloride flush, sodium chloride, ondansetron **OR** ondansetron, polyethylene glycol     Labs:  Personally reviewed and interpreted for clinical significance. Recent Labs     07/19/23  1211 07/20/23  0441 07/21/23  0745   WBC 11.8* 8.5 13.1*   HGB 9.5* 9.4* 9.4*   HCT 30.0* 28.5* 28.9*   * 408 434     Recent Labs     07/19/23  1211 07/20/23  0441 07/21/23  0745   * 140 140   K 5.4* 4.3 4.3   CL 98* 104 102   CO2 24 24 25   BUN 10 8 8   CREATININE 0.8 0.6 0.6   CALCIUM 9.4 9.0 9.3     Recent Labs     07/19/23  1211 07/19/23  1336   PROBNP 4,221*  --    TROPHS 21* 19*     No results for input(s): LABA1C in the last 72 hours. Recent Labs     07/19/23  1211   AST 45*   ALT 14   BILITOT 0.3   ALKPHOS 113     No results for input(s): INR, LACTA, TSH in the last 72 hours. Urine Cultures: No results found for: LABURIN  Blood Cultures:   Lab Results   Component Value Date/Time    [facility]  07/19/2023 05:26 PM     No Growth to date. Any change in status will be called. Lab Results   Component Value Date/Time    BLOODCULT2  07/19/2023 05:35 PM     No Growth to date. Any change in status will be called.      Organism:   Lab Results   Component Value Date/Time    ORG Serratia marcescens 12/23/2020 08:20 AM         Rozina Pacheco MD

## 2023-07-21 NOTE — PROGRESS NOTES
Pt back from cath lab; 1 stent placed to the LAD, TR band in place. No bleeding, swelling, or hematoma present, distal pulses +2. 10 mL air in TR band, will start taking air out at 11:48 per cath lab. VSS and cycling per protocol. Call light within reach, pt has no needs at this time. 1314: TR band off after 10 mL air removed q15. No bleeding, swelling or hematoma. Tegaderm placed over site. Pt is educated how to care for post cath site and armboard is put in place for reinforcement. VSS, pt has no needs at this time, call light within reach.

## 2023-07-21 NOTE — PROGRESS NOTES
Pt A&Ox4, able to make needs known. R Fem site dressing remains in place, no drainage or bleeding noted. R IJ dressing taken off at this time per pts request, site WNL. Pt NPO at this time in preparation to go back down to the cath lab in the morning. No new complaints. VSS.    Latricia Oropeza RN

## 2023-07-21 NOTE — PROGRESS NOTES
Occupational Therapy  Attempted to see pt this afternoon. Pt has returned from cath lab, but is leaving the floor to go to vascular lab. Will follow up as pt condition and schedule allows. Thank you.     Thomas Gordon, OTR/L

## 2023-07-21 NOTE — PROCEDURES
CARDIAC CATHETERIZATION REPORT     Procedure Date:  2023  Patient Name: Neha Monroy  MRN: [id number] : 1955      : Regina Jones MD      PROCEDURES PERFORMED  Selective coronary angiography via right radial approach  IVUS evaluation of LAD  PCI to proximal LAD with MICHAEL x 1  Moderate sedation 15 min CPT 34182 (Versed: 1mg, Fentanyl: 50mcg)  Sedation start time: 0948  Sedation end time: 1032  US guidance for vascular access CPT 22618      INDICATION  Unstable angina, cardiomyopathy    PROCEDURE DESCRIPTION  Risks/benefits/alternatives/outcomes were discussed with patient and/or family in detail and informed consent was obtained. Patient was prepped and draped in the usual sterile fashion. Versed and fentanyl were used for conscious sedation. Then local anaesthetic was applied over right radial puncture site. Using a modified Seldinger technique, the right radial artery was selectively cannulated and a 6Fr Terumo sheath was inserted into right radial artery. Verapamil and nitroglycerin were administered through the sheath. Heparin was administered. XB 3.06 [place] guide catheter was used to engage left main coronary artery. See below for details of IVUS of proximal LAD as well as PCI with drug-eluting stent placement. At the conclusion of the procedure, a TR band was placed over the puncture site and hemostasis was obtained. There were no immediate complications. I supervised the sedation with fentanyl and midazolam. An independent trained observer pushed [employer] at my direction. We monitored the patient's level of consciousness and vital signs/physiologic status throughout the procedure duration. At the end of the procedure, the radial sheath was removed and a TR band was placed over the arteriotomy site. Patient tolerated the procedure well.       FINDINGS  Left main - Normal  LAD - aneurysmal in the proximal segment, 80% stenosis in the proximal to mid segment which was treated

## 2023-07-21 NOTE — DISCHARGE INSTRUCTIONS
FOLLOW-UP APPOINTMENTS    [place] OFFICE - Your follow-up appointment has been changed to August 24th at 11am with Lizy Dutta CNP. Please arrive 15 minutes early to complete necessary paperwork. [facility] Office [address]:  [phone]. If you are unable to make this appointment, please call to reschedule [phone]. To get to the office go to the side of the hospital at [facility], enter at the [facility], entrance that faces [address]. Take the elevator to the 3rd floor turn right off elevator then take hallway to the right. Go down the do and office will be on your right [address].

## 2023-07-21 NOTE — CARE COORDINATION
Chart review day 1- pt from home with spouse, Roberto Cole. Home O2 with [employer] prn. Pt wants out pt Cardiac rehab- they are to reach out to pt on Monday and pt was provided with their phone 3 [phone]. Anticipate no other dc needs at this time. Pt previously active with [facility] but no longer receiving services.

## 2023-07-21 NOTE — PROGRESS NOTES
Physical Therapy  Order received, chart reviewed, pt off floor at cath lab at time of attempt, will follow up as pt condition and schedule allow  No charge  Thank you  Timothy Fortune, PT

## 2023-07-22 VITALS
RESPIRATION RATE: 16 BRPM | WEIGHT: 165.7 LBS | HEART RATE: 102 BPM | HEIGHT: 61 IN | OXYGEN SATURATION: 93 % | BODY MASS INDEX: 31.28 KG/M2 | SYSTOLIC BLOOD PRESSURE: 120 MMHG | TEMPERATURE: 98.6 F | DIASTOLIC BLOOD PRESSURE: 77 MMHG

## 2023-07-22 LAB
ANION GAP SERPL CALCULATED.3IONS-SCNC: 14 MMOL/L (ref 3–16)
BUN SERPL-MCNC: 12 MG/DL (ref 7–20)
CALCIUM SERPL-MCNC: 9.1 MG/DL (ref 8.3–10.6)
CHLORIDE SERPL-SCNC: 101 MMOL/L (ref 99–110)
CO2 SERPL-SCNC: 24 MMOL/L (ref 21–32)
CREAT SERPL-MCNC: 0.6 MG/DL (ref 0.6–1.2)
DEPRECATED RDW RBC AUTO: 18.6 % (ref 12.4–15.4)
GFR SERPLBLD CREATININE-BSD FMLA CKD-EPI: >60 ML/MIN/{1.73_M2}
GLUCOSE SERPL-MCNC: 116 MG/DL (ref 70–99)
HCT VFR BLD AUTO: 26.6 % (ref 36–48)
HGB BLD-MCNC: 8.7 G/DL (ref 12–16)
MCH RBC QN AUTO: 28.9 PG (ref 26–34)
MCHC RBC AUTO-ENTMCNC: 32.7 G/DL (ref 31–36)
MCV RBC AUTO: 88.4 FL (ref 80–100)
PLATELET # BLD AUTO: 353 K/UL (ref 135–450)
PMV BLD AUTO: 7.3 FL (ref 5–10.5)
POTASSIUM SERPL-SCNC: 4.2 MMOL/L (ref 3.5–5.1)
RBC # BLD AUTO: 3.01 M/UL (ref 4–5.2)
SODIUM SERPL-SCNC: 139 MMOL/L (ref 136–145)
WBC # BLD AUTO: 9.5 K/UL (ref 4–11)

## 2023-07-22 PROCEDURE — 97535 SELF CARE MNGMENT TRAINING: CPT

## 2023-07-22 PROCEDURE — 36415 COLL VENOUS BLD VENIPUNCTURE: CPT

## 2023-07-22 PROCEDURE — 6360000002 HC RX W HCPCS: Performed by: INTERNAL MEDICINE

## 2023-07-22 PROCEDURE — 80048 BASIC METABOLIC PNL TOTAL CA: CPT

## 2023-07-22 PROCEDURE — 85027 COMPLETE CBC AUTOMATED: CPT

## 2023-07-22 PROCEDURE — 6370000000 HC RX 637 (ALT 250 FOR IP): Performed by: INTERNAL MEDICINE

## 2023-07-22 PROCEDURE — 97116 GAIT TRAINING THERAPY: CPT

## 2023-07-22 PROCEDURE — 97165 OT EVAL LOW COMPLEX 30 MIN: CPT

## 2023-07-22 PROCEDURE — 99232 SBSQ HOSP IP/OBS MODERATE 35: CPT | Performed by: NURSE PRACTITIONER

## 2023-07-22 PROCEDURE — 2580000003 HC RX 258: Performed by: INTERNAL MEDICINE

## 2023-07-22 PROCEDURE — 97161 PT EVAL LOW COMPLEX 20 MIN: CPT

## 2023-07-22 RX ORDER — OXYCODONE HYDROCHLORIDE 5 MG/1
5 TABLET ORAL EVERY 6 HOURS PRN
Qty: 28 TABLET | Refills: 0 | Status: CANCELLED | OUTPATIENT
Start: 2023-07-22 | End: 2023-07-29

## 2023-07-22 RX ORDER — FUROSEMIDE 20 MG/1
20 TABLET ORAL SEE ADMIN INSTRUCTIONS
Qty: 60 TABLET | Refills: 3 | Status: SHIPPED
Start: 2023-07-22

## 2023-07-22 RX ORDER — METOPROLOL SUCCINATE 25 MG/1
12.5 TABLET, EXTENDED RELEASE ORAL DAILY
Qty: 90 TABLET | Refills: 1 | Status: SHIPPED | OUTPATIENT
Start: 2023-07-22

## 2023-07-22 RX ADMIN — FERROUS SULFATE TAB 325 MG (65 MG ELEMENTAL FE) 325 MG: 325 (65 FE) TAB at 10:33

## 2023-07-22 RX ADMIN — CLOPIDOGREL BISULFATE 75 MG: 75 TABLET ORAL at 10:33

## 2023-07-22 RX ADMIN — FUROSEMIDE 20 MG: 20 TABLET ORAL at 10:34

## 2023-07-22 RX ADMIN — ACETAMINOPHEN 650 MG: 325 TABLET ORAL at 10:33

## 2023-07-22 RX ADMIN — METOPROLOL TARTRATE 6.25 MG: 25 TABLET, FILM COATED ORAL at 10:33

## 2023-07-22 RX ADMIN — ACETAMINOPHEN 650 MG: 325 TABLET ORAL at 04:13

## 2023-07-22 RX ADMIN — DOCUSATE SODIUM 100 MG: 100 CAPSULE, LIQUID FILLED ORAL at 10:32

## 2023-07-22 RX ADMIN — PANTOPRAZOLE SODIUM 40 MG: 40 TABLET, DELAYED RELEASE ORAL at 05:31

## 2023-07-22 RX ADMIN — ASPIRIN 81 MG: 81 TABLET, COATED ORAL at 10:32

## 2023-07-22 RX ADMIN — ENOXAPARIN SODIUM 40 MG: 100 INJECTION SUBCUTANEOUS at 10:34

## 2023-07-22 RX ADMIN — Medication 10 ML: at 10:36

## 2023-07-22 ASSESSMENT — PAIN SCALES - WONG BAKER: WONGBAKER_NUMERICALRESPONSE: 0

## 2023-07-22 ASSESSMENT — PAIN SCALES - GENERAL
PAINLEVEL_OUTOF10: 1
PAINLEVEL_OUTOF10: 0

## 2023-07-22 ASSESSMENT — PAIN - FUNCTIONAL ASSESSMENT: PAIN_FUNCTIONAL_ASSESSMENT: ACTIVITIES ARE NOT PREVENTED

## 2023-07-22 ASSESSMENT — PAIN DESCRIPTION - LOCATION: LOCATION: GENERALIZED

## 2023-07-22 ASSESSMENT — PAIN DESCRIPTION - DESCRIPTORS: DESCRIPTORS: SORE

## 2023-07-22 NOTE — PROGRESS NOTES
Pt has been up and about the room all day anticipating discharge. Cardio np was on the floor, no new orders. I called out to cardio to enquire their rounding estimated time. Jeremias is rounding, waiting call back. Cardio np came to the floor to round. Ok to discharge by cardio standpoint. Messaged attending.

## 2023-07-22 NOTE — PROGRESS NOTES
Physical Therapy  Facility/Department: [facility] C4 PCU  Physical Therapy Initial Assessment/Discharge Summary    Name: Mendez Batista  : 1955  MRN: [id number]  Date of Service: 2023    Discharge Recommendations:  Home with assist PRN (cardiac rehab)   PT Equipment Recommendations  Equipment Needed: No      Patient Diagnosis(es): The primary encounter diagnosis was Chest pain, unspecified type. A diagnosis of Dyspnea and respiratory abnormalities was also pertinent to this visit. 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 Past Surgical History:  has a past surgical history that includes Foot surgery (Bilateral); Hand surgery (Left); Hysterectomy; Coronary angioplasty with stent; eye surgery (Left, 2016); eye surgery (Right, 2016); Foot surgery (Left); Foot Amputation (Left, 2020); Coronary artery bypass graft (N/A, 2023); and Femoral-tibial Bypass Graft (Right, 2023). Assessment   Assessment: Pt is a 70year old female admitted with chest pain, SOB, now s/p cardiac cath 23. Pt with recent PMHx CABG x 3 on 23. Pt able to complete bed mobility and functional transfers with supervision, and ambulate in room with supervision without AD. Pt reports she is currently functioning at recent baseline (since CABG) and states she has no concerns regarding mobility and safety upon d/c home with family. Pt with no acute PT deficits at this time and is d/c'd from PT services. Pt safe to d/c home with PRN assist and cardiac rehab upon d/c.   Therapy Prognosis: Good  Decision Making: Low Complexity  Requires PT Follow-Up: No  Activity Tolerance  Activity Tolerance: Patient tolerated evaluation without incident     Plan   Physcial Therapy Plan  General Plan:

## 2023-07-22 NOTE — DISCHARGE SUMMARY
Hospital Medicine Discharge Summary    Patient: Jevon Whittington   : 1955     Admit Date: 2023   Discharge Date: 2023    Disposition:  [x]Home   []HHC  []SNF  []Acute Rehab  []LTAC  []Hospice  Code status:  [x]Full  []DNR/CCA  []Limited (DNR/CCA with Do Not Intubate)  []DNRCC  Condition at Discharge: Stable  Primary Care Provider: Radha Mendoza MD    Admitting Provider: Aurelio Patel MD  Discharge Provider: Aurelio Patel MD     Discharge Diagnoses: Active Hospital Problems    Diagnosis     History of coronary artery disease [Z86.79]     Chest pain [R07.9]        Presenting Admission History:      70 y.o. female with hx of cad/cabg, PVD(fem bypass in ), htn, hld who presented to [facility] with ongoing sob/cp. Pt has noted chest pain since cabg. She saw Hussein TOMPKINS in office and felt pain was expected. Pt states pain is still ongoing with minimal improvement. Also she notes ongoing SOB(mainly exertional and with talking and if laying flat/on side. She has been sleeping in a recliner due to ongoing chest pain with laying in bed. Family notes o2 sats down to 83-85% range when pt is sob. Around , pt had fever of 101 and assoc emesis. She also notes ongoing productive cough(greyish sputum with blackish specs). No n/v/diarrhea this past week. Pt saw Dr. Angelica Sanchez on Friday and noted ongoin BLE swelling(and felt cardiac related). Echo was ordered and arranged closer f/u with [facility]. However, pt last night, while sleeping on couch and laying on left side, noted SOB so daughter called cards office today and told to come to [facility] ER given ongoing cp.      ER course: ctpa ordered, CT surg consulted, bcx obtained, asa/oxycodone given        Assessment/Plan:      Chest pain/SOB-unclear etiology, possibly due to ongoing cardiomyopathy, troponins were low and stable  -CTPA obtained, neg for PE  -cards consulted, apprec recs  -held aldactone given mildly elevated k  -consider trial low 50 MG capsule Take 2 capsules by mouth dailyHistorical Med      acetaminophen (TYLENOL) 500 MG tablet Take 650 mg by mouth every 6 hours as neededHistorical Med      Lift Chair MISC Starting Tue 6/20/2023, Disp-1 each, R-0, Print      aspirin 81 MG EC tablet Take 1 tablet by mouth daily, Disp-30 tablet, R-3Normal      pramipexole (MIRAPEX) 0.25 MG tablet Take 1 tablet by mouth nightlyHistorical Med      Multiple Vitamins-Minerals (MULTIVITAMIN ADULTS PO) Take by mouthHistorical Med      clopidogrel (PLAVIX) 75 MG tablet Take 1 tablet by mouth dailyHistorical Med           Discharge Medication List as of 7/22/2023  3:16 PM        STOP taking these medications       losartan (COZAAR) 25 MG tablet Comments:   Reason for Stopping:         metoprolol tartrate (LOPRESSOR) 25 MG tablet Comments:   Reason for Stopping:         potassium chloride (KLOR-CON M) 10 MEQ extended release tablet Comments:   Reason for Stopping:               Significant Test Results    XR CHEST (2 VW)    Result Date: 7/19/2023  EXAMINATION: TWO XRAY VIEWS OF THE CHEST 7/19/2023 12:11 pm COMPARISON: 05/26/2023 HISTORY: ORDERING SYSTEM PROVIDED HISTORY: chest pain TECHNOLOGIST PROVIDED HISTORY: Reason for exam:->chest pain Reason for Exam: chest pain. SOB post surgery FINDINGS: The lungs are without acute focal process. There are trace bilateral pleural effusions. The cardiomediastinal silhouette is stable. The osseous structures are stable. Postop changes of CABG are present. Cardiomegaly with trace bilateral pleural effusions. CT CHEST PULMONARY EMBOLISM W CONTRAST    Result Date: 7/19/2023  EXAMINATION: CTA OF THE CHEST 7/19/2023 3:19 pm TECHNIQUE: CTA of the chest was performed after the administration of intravenous contrast.  Multiplanar reformatted images are provided for review. MIP images are provided for review.  Automated exposure control, iterative reconstruction, and/or weight based adjustment of the mA/kV was utilized to

## 2023-07-22 NOTE — PLAN OF CARE
Problem: Discharge Planning  Goal: Discharge to home or other facility with appropriate resources  7/22/2023 1508 by Barabara Fothergill, RN  Outcome: Completed  7/22/2023 1115 by Barabara Fothergill, RN  Outcome: Progressing     Problem: Pain  Goal: Verbalizes/displays adequate comfort level or baseline comfort level  7/22/2023 1508 by Barabara Fothergill, RN  Outcome: Completed  7/22/2023 1115 by Barabara Fothergill, RN  Outcome: Progressing     Problem: Safety - Adult  Goal: Free from fall injury  7/22/2023 1508 by Barabara Fothergill, RN  Outcome: Completed  7/22/2023 1115 by Barabara Fothergill, RN  Outcome: Progressing     Problem: Chronic Conditions and Co-morbidities  Goal: Patient's chronic conditions and co-morbidity symptoms are monitored and maintained or improved  7/22/2023 1508 by Barabara Fothergill, RN  Outcome: Completed  7/22/2023 1115 by Barabara Fothergill, RN  Outcome: Progressing     Problem: ABCDS Injury Assessment  Goal: Absence of physical injury  7/22/2023 1508 by Barabara Fothergill, RN  Outcome: Completed  7/22/2023 1115 by Barabara Fothergill, RN  Outcome: Progressing

## 2023-07-22 NOTE — PROGRESS NOTES
reports 3/10 sternal and RLE pain. Social/Functional History  Social/Functional History  Lives With: Spouse  Type of Home: House  Home Layout: Two level, Performs ADL's on one level, Able to Live on Main level with bedroom/bathroom  Home Access: Stairs to enter without rails  Entrance Stairs - Number of Steps: 3  Bathroom Shower/Tub: Tub/Shower unit  Bathroom Toilet: Standard  Bathroom Equipment: Grab bars in shower  Home Equipment: Walker, rolling (PRN RW)  Has the patient had two or more falls in the past year or any fall with injury in the past year?: No  ADL Assistance: Independent  Homemaking Assistance: Independent  Homemaking Responsibilities: Yes  Ambulation Assistance: Independent  Transfer Assistance: Independent  Active : Yes  Occupation: Retired  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  Additional Comments:  is self employed, daughter lives 2 minutes up the road       Objective   Pulse: (!) 102  Heart Rate Source: Monitor  BP: 120/77  BP Location: Left upper arm  BP Method: Automatic  Patient Position: Sitting  MAP (Calculated): 91  Respirations: 16  SpO2: 93 %  O2 Device: None (Room air)          Observation/Palpation  Posture: Good  Safety Devices  Type of Devices: All golden prominences offloaded;Call light within reach; Patient at risk for falls; Left in chair;Nurse notified  Bed Mobility Training  Bed Mobility Training: Yes  Overall Level of Assistance: Supervision  Interventions: Verbal cues; Safety awareness training;Manual cues  Supine to Sit: Supervision  Balance  Sitting: Intact  Standing: High guard  Transfer Training  Transfer Training: Yes  Sit to Stand: Supervision  Stand to Sit: Supervision  Bed to Chair: Supervision  Gait Training  Gait Training: Yes  Gait  Overall Level of Assistance: Supervision  Base of Support: Widened  Speed/Viki: Slow  Step Length: Left shortened;Right shortened  Gait Abnormalities: Decreased step clearance  Distance (ft): 100 Feet (+ 25ft; [address] [facility] Code Treatment Minutes: 8 Minutes (10 minute evaulation)       Morena Layne, OT

## 2023-07-22 NOTE — PLAN OF CARE
Problem: Discharge Planning  Goal: Discharge to home or other facility with appropriate resources  Outcome: Progressing     Problem: Pain  Goal: Verbalizes/displays adequate comfort level or baseline comfort level  Outcome: Progressing     Problem: Safety - Adult  Goal: Free from fall injury  Outcome: Progressing     Problem: Chronic Conditions and Co-morbidities  Goal: Patient's chronic conditions and co-morbidity symptoms are monitored and maintained or improved  Outcome: Progressing     Problem: ABCDS Injury Assessment  Goal: Absence of physical injury  Outcome: Progressing     Patient's EF (Ejection Fraction) is less than 40%    Heart Failure Medications:  Diuretics[de-identified] Furosemide    (One of the following REQUIRED for EF </= 40%/SYSTOLIC FAILURE but MAY be used in EF% >40%/DIASTOLIC FAILURE)        ACE[de-identified] None        ARB[de-identified] None         ARNI[de-identified] None    (Beta Blockers)  NON- Evidenced Based Beta Blocker (for EF% >40%/DIASTOLIC FAILURE): Metoprolol TARTrate- Lopressor    Evidenced Based Beta Blocker::(REQUIRED for EF% <40%/SYSTOLIC FAILURE) None  . .................................................................................................................................................. Healthy Weight Tracking - BMI + Meds 6/20/2023 6/26/2023 7/14/2023 7/19/2023 7/19/2023 7/21/2023 7/22/2023   Weight 165 lb 6.4 oz 166 lb 166 lb 166 lb 168 lb 8 oz 164 lb 11.2 oz 165 lb 11.2 oz   Height 5' 1\" 5' 1\" 5' 1\" 5' 0\" 5' 1\" - -   Body Mass Index 31.25 kg/m2 31.37 kg/m2 31.37 kg/m2 32.42 kg/m2 31.84 kg/m2 31.12 kg/m2 31.31 kg/m2   Some recent data might be hidden         Patient's weights and intake/output reviewed: Yes    Daily Weight log at bedside and being used: \"yes    Patient's Last Weight: 166 lbs obtained by standing scale. Difference of 2 lbs more than last documented weight.       Intake/Output Summary (Last 24 hours) at 7/22/2023 1115  Last data filed at 7/21/2023 2108  Gross per 24 hour   Intake 582 ml

## 2023-07-23 LAB
BACTERIA BLD CULT ORG #2: NORMAL
BACTERIA BLD CULT: NORMAL

## 2023-07-24 ENCOUNTER — TELEPHONE (OUTPATIENT)
Dept: CARDIOLOGY | Age: 68
End: 2023-07-24

## 2023-07-24 NOTE — TELEPHONE ENCOUNTER
Patient needs a 2-3 week f/u with MARY or Dr. Lynn Nelson s/p cath and PCI per Dr. Danna Mccurdy. Please call the patient to schedule. Thank You.

## 2023-07-25 ENCOUNTER — TELEPHONE (OUTPATIENT)
Dept: INTERNAL MEDICINE CLINIC | Age: 68
End: 2023-07-25

## 2023-07-25 NOTE — TELEPHONE ENCOUNTER
----- Message from VEL Tamayo CNP sent at 7/25/2023  3:53 PM EDT -----  I don't see it on recent medication lists. Can double check with cardiologist, but I don't think she is to be taking it    ----- Message -----  From: Marcelina Loo  Sent: 7/25/2023  10:34 AM EDT  To: VEL Tamayo CNP, Dr patient recently discharge from hospital.  States that [facility] just refilled Isosorbide. Patient wanting to know if she is still suppose to be taking this?

## 2023-07-25 NOTE — TELEPHONE ENCOUNTER
JONATHON is OOT this week and is unable to approve an overbook appt. I did check her schedule and her very first available is 8/22. Which is only two days sooner than the appt she already has scheduled. Ben Briones RN, would VINICIUS approve an overbook appt for this pt?

## 2023-07-25 NOTE — TELEPHONE ENCOUNTER
Care Transitions Initial Follow Up Call    Outreach made within 2 business days of discharge: Yes    Patient: Miguel A Pritchard Patient : 1955   MRN: [id number]  Reason for Admission: There are no discharge diagnoses documented for the most recent discharge. Discharge Date: 23       Spoke with: 2023 @ 10:22 am: Left message to return call. Discharge department/facility: [facility]     TCM Interactive Patient Contact:  Was patient able to fill all prescriptions: Yes  Was patient instructed to bring all medications to the follow-up visit: Yes  Is patient taking all medications as directed in the discharge summary?  Yes  Does patient understand their discharge instructions: Yes  Does patient have questions or concerns that need addressed prior to 7-14 day follow up office visit: yes - Message sent to Norma Rodriguez     Scheduled appointment with PCP within 7-14 days    Follow Up  Future Appointments   Date Time Provider [address]   2023  9:10 AM Yrn Gardner MD [place] [facility] None   2023 11:00 AM VEL Sosa - CNP P [facility] CAR DARLIN Rendon

## 2023-07-25 NOTE — TELEPHONE ENCOUNTER
Dr. Cruz Cartagena is requesting the patient be seen in 2-3 weeks from her discharge date of 7/22/23. The 8/24/23 appointment is too far out. Thank You.

## 2023-07-27 LAB
POC ACT LR: 139 SEC
POC ACT LR: >400 SEC
POC ACT LR: >400 SEC

## 2023-08-02 ENCOUNTER — OFFICE VISIT (OUTPATIENT)
Dept: INTERNAL MEDICINE CLINIC | Age: 68
End: 2023-08-02

## 2023-08-02 DIAGNOSIS — I50.42 CHRONIC COMBINED SYSTOLIC AND DIASTOLIC CONGESTIVE HEART FAILURE (HCC): ICD-10-CM

## 2023-08-02 DIAGNOSIS — Z72.0 NICOTINE USE: ICD-10-CM

## 2023-08-02 DIAGNOSIS — D50.0 IRON DEFICIENCY ANEMIA DUE TO CHRONIC BLOOD LOSS: ICD-10-CM

## 2023-08-02 DIAGNOSIS — M79.605 CHRONIC PAIN OF BOTH LOWER EXTREMITIES: ICD-10-CM

## 2023-08-02 DIAGNOSIS — I73.9 PVD (PERIPHERAL VASCULAR DISEASE) (HCC): ICD-10-CM

## 2023-08-02 DIAGNOSIS — G89.29 CHRONIC PAIN OF BOTH LOWER EXTREMITIES: ICD-10-CM

## 2023-08-02 DIAGNOSIS — I25.5 ISCHEMIC CARDIOMYOPATHY: ICD-10-CM

## 2023-08-02 DIAGNOSIS — I25.810 CORONARY ARTERY DISEASE INVOLVING CORONARY BYPASS GRAFT OF NATIVE HEART WITHOUT ANGINA PECTORIS: ICD-10-CM

## 2023-08-02 DIAGNOSIS — M79.604 CHRONIC PAIN OF BOTH LOWER EXTREMITIES: ICD-10-CM

## 2023-08-02 DIAGNOSIS — I25.10 MULTIPLE VESSEL CORONARY ARTERY DISEASE: Primary | ICD-10-CM

## 2023-08-02 LAB
ANION GAP SERPL CALCULATED.3IONS-SCNC: 15 MMOL/L (ref 3–16)
BASOPHILS # BLD: 0 K/UL (ref 0–0.2)
BASOPHILS NFR BLD: 0.4 %
BUN SERPL-MCNC: 15 MG/DL (ref 7–20)
CALCIUM SERPL-MCNC: 9.9 MG/DL (ref 8.3–10.6)
CHLORIDE SERPL-SCNC: 98 MMOL/L (ref 99–110)
CO2 SERPL-SCNC: 24 MMOL/L (ref 21–32)
CREAT SERPL-MCNC: 0.8 MG/DL (ref 0.6–1.2)
DEPRECATED RDW RBC AUTO: 18.7 % (ref 12.4–15.4)
EOSINOPHIL # BLD: 0 K/UL (ref 0–0.6)
EOSINOPHIL NFR BLD: 0.1 %
FERRITIN SERPL IA-MCNC: 235.3 NG/ML (ref 15–150)
FOLATE SERPL-MCNC: 13.01 NG/ML (ref 4.78–24.2)
GFR SERPLBLD CREATININE-BSD FMLA CKD-EPI: >60 ML/MIN/{1.73_M2}
GLUCOSE SERPL-MCNC: 102 MG/DL (ref 70–99)
HCT VFR BLD AUTO: 32.8 % (ref 36–48)
HGB BLD-MCNC: 10.8 G/DL (ref 12–16)
IRON SATN MFR SERPL: 12 % (ref 15–50)
IRON SERPL-MCNC: 37 UG/DL (ref 37–145)
LYMPHOCYTES # BLD: 1.1 K/UL (ref 1–5.1)
LYMPHOCYTES NFR BLD: 11.4 %
MCH RBC QN AUTO: 28.5 PG (ref 26–34)
MCHC RBC AUTO-ENTMCNC: 32.9 G/DL (ref 31–36)
MCV RBC AUTO: 86.7 FL (ref 80–100)
MONOCYTES # BLD: 0.6 K/UL (ref 0–1.3)
MONOCYTES NFR BLD: 5.6 %
NEUTROPHILS # BLD: 8.2 K/UL (ref 1.7–7.7)
NEUTROPHILS NFR BLD: 82.5 %
NT-PROBNP SERPL-MCNC: 2105 PG/ML (ref 0–124)
PLATELET # BLD AUTO: 467 K/UL (ref 135–450)
PMV BLD AUTO: 7.2 FL (ref 5–10.5)
POTASSIUM SERPL-SCNC: 4.6 MMOL/L (ref 3.5–5.1)
RBC # BLD AUTO: 3.78 M/UL (ref 4–5.2)
SODIUM SERPL-SCNC: 137 MMOL/L (ref 136–145)
TIBC SERPL-MCNC: 306 UG/DL (ref 260–445)
VIT B12 SERPL-MCNC: 541 PG/ML (ref 211–911)
WBC # BLD AUTO: 10 K/UL (ref 4–11)

## 2023-08-02 PROCEDURE — G8417 CALC BMI ABV UP PARAM F/U: HCPCS | Performed by: INTERNAL MEDICINE

## 2023-08-02 PROCEDURE — G8400 PT W/DXA NO RESULTS DOC: HCPCS | Performed by: INTERNAL MEDICINE

## 2023-08-02 PROCEDURE — 99213 OFFICE O/P EST LOW 20 MIN: CPT | Performed by: INTERNAL MEDICINE

## 2023-08-02 PROCEDURE — G8427 DOCREV CUR MEDS BY ELIG CLIN: HCPCS | Performed by: INTERNAL MEDICINE

## 2023-08-02 PROCEDURE — 1123F ACP DISCUSS/DSCN MKR DOCD: CPT | Performed by: INTERNAL MEDICINE

## 2023-08-02 PROCEDURE — 1036F TOBACCO NON-USER: CPT | Performed by: INTERNAL MEDICINE

## 2023-08-02 PROCEDURE — 3017F COLORECTAL CA SCREEN DOC REV: CPT | Performed by: INTERNAL MEDICINE

## 2023-08-02 PROCEDURE — 1090F PRES/ABSN URINE INCON ASSESS: CPT | Performed by: INTERNAL MEDICINE

## 2023-08-02 PROCEDURE — 1111F DSCHRG MED/CURRENT MED MERGE: CPT | Performed by: INTERNAL MEDICINE

## 2023-08-02 RX ORDER — PANTOPRAZOLE SODIUM 40 MG/1
1 TABLET, DELAYED RELEASE ORAL DAILY
COMMUNITY
Start: 2023-06-26

## 2023-08-02 RX ORDER — METOPROLOL SUCCINATE 25 MG/1
25 TABLET, EXTENDED RELEASE ORAL DAILY
Qty: 90 TABLET | Refills: 1 | Status: SHIPPED
Start: 2023-08-02

## 2023-08-02 RX ORDER — ALBUTEROL SULFATE 90 UG/1
2 AEROSOL, METERED RESPIRATORY (INHALATION) 4 TIMES DAILY PRN
COMMUNITY
Start: 2023-05-16

## 2023-08-02 RX ORDER — ISOSORBIDE MONONITRATE 120 MG/1
120 TABLET, EXTENDED RELEASE ORAL EVERY MORNING
COMMUNITY
Start: 2023-07-23 | End: 2023-08-02 | Stop reason: ALTCHOICE

## 2023-08-02 RX ORDER — HYDROCODONE BITARTRATE AND ACETAMINOPHEN 5; 325 MG/1; MG/1
1 TABLET ORAL EVERY 6 HOURS PRN
Qty: 20 TABLET | Refills: 0 | Status: SHIPPED | OUTPATIENT
Start: 2023-08-02 | End: 2023-08-09

## 2023-08-02 RX ORDER — FUROSEMIDE 40 MG/1
40 TABLET ORAL DAILY
Qty: 90 TABLET | Refills: 0 | Status: SHIPPED
Start: 2023-08-02

## 2023-08-02 NOTE — PROGRESS NOTES
established. In some patients with   nonalcoholic fatty liver disease, the cutoff values for compensated advanced   chronic liver disease may be lower (7-9 kPa). In some settings (e.g.,   elevated liver function tests, nonfasting state, vascular congestion), the   stage of liver fibrosis may be overestimated. Cath 7/21-     Left main - Normal  LAD - aneurysmal in the proximal segment, 80% stenosis in the proximal to mid segment which was treated successfully with PCI under IVUS guidance and placement of a drug-eluting stent with good final result  Left circumflex - 70% stenosis in the midsegment  RCA - not engaged     PCI - Proximal LAD  Pre-PCI stenosis - 80%  Pre-PCI AMISH Flow - III  Post-PCI stenosis - 0%  Post-PCI AMISH Flow - III  Guide catheter - 6Fr XB 3.0  Pre-dilation - 3.0 x 12mm SC balloon  Stent - 3.0 x 38mm John  Post-dilation - 3.5 x  15mm NC, 4.5 x 12mm NC    CTA chest    IMPRESSION:  1. There is interval increase in the pericardial effusion. Stable moderate  to severe cardiomegaly. 2. No evidence of pulmonary embolism or acute pulmonary abnormality. 3. Mild bilateral pleural effusions and early interstitial and alveolar edema. Venous doppler     There is an acute totally occluding superficial venous thrombosis involving   the left residual proximal greater saphenous vein, measuring 1.93 cm from   the saphenofemoral junction, as well as the distal ankle. Within the limits of this exam, there is no other evidence of deep or   superficial venous thrombosis involving the bilateral lower extremities. Wt Readings from Last 3 Encounters:   08/02/23 162 lb (73.5 kg)   07/22/23 165 lb 11.2 oz (75.2 kg)   07/14/23 166 lb (75.3 kg)       Assessment and Plan     Diagnosis Orders   1. Multiple vessel coronary artery disease        2. PVD (peripheral vascular disease) ([address])        3. Nicotine use        4.  Coronary artery disease involving coronary bypass graft of native heart without angina

## 2023-08-03 VITALS
HEIGHT: 61 IN | SYSTOLIC BLOOD PRESSURE: 110 MMHG | BODY MASS INDEX: 30.58 KG/M2 | RESPIRATION RATE: 18 BRPM | HEART RATE: 65 BPM | WEIGHT: 162 LBS | DIASTOLIC BLOOD PRESSURE: 65 MMHG

## 2023-08-08 RX ORDER — FERROUS SULFATE 325(65) MG
TABLET ORAL
Qty: 30 TABLET | Refills: 1 | OUTPATIENT
Start: 2023-08-08

## 2023-08-09 ENCOUNTER — OFFICE VISIT (OUTPATIENT)
Dept: CARDIOLOGY CLINIC | Age: 68
End: 2023-08-09
Payer: MEDICARE

## 2023-08-09 VITALS
SYSTOLIC BLOOD PRESSURE: 115 MMHG | DIASTOLIC BLOOD PRESSURE: 80 MMHG | WEIGHT: 160.4 LBS | HEIGHT: 61 IN | OXYGEN SATURATION: 98 % | BODY MASS INDEX: 30.29 KG/M2 | HEART RATE: 95 BPM

## 2023-08-09 DIAGNOSIS — I50.20 HFREF (HEART FAILURE WITH REDUCED EJECTION FRACTION) (HCC): Primary | ICD-10-CM

## 2023-08-09 DIAGNOSIS — I25.10 CORONARY ARTERY DISEASE INVOLVING NATIVE CORONARY ARTERY OF NATIVE HEART WITHOUT ANGINA PECTORIS: ICD-10-CM

## 2023-08-09 DIAGNOSIS — I25.5 ISCHEMIC CARDIOMYOPATHY: ICD-10-CM

## 2023-08-09 PROCEDURE — 1123F ACP DISCUSS/DSCN MKR DOCD: CPT | Performed by: NURSE PRACTITIONER

## 2023-08-09 PROCEDURE — G8417 CALC BMI ABV UP PARAM F/U: HCPCS | Performed by: NURSE PRACTITIONER

## 2023-08-09 PROCEDURE — 1036F TOBACCO NON-USER: CPT | Performed by: NURSE PRACTITIONER

## 2023-08-09 PROCEDURE — 1090F PRES/ABSN URINE INCON ASSESS: CPT | Performed by: NURSE PRACTITIONER

## 2023-08-09 PROCEDURE — 1111F DSCHRG MED/CURRENT MED MERGE: CPT | Performed by: NURSE PRACTITIONER

## 2023-08-09 PROCEDURE — G8427 DOCREV CUR MEDS BY ELIG CLIN: HCPCS | Performed by: NURSE PRACTITIONER

## 2023-08-09 PROCEDURE — G8400 PT W/DXA NO RESULTS DOC: HCPCS | Performed by: NURSE PRACTITIONER

## 2023-08-09 PROCEDURE — 93000 ELECTROCARDIOGRAM COMPLETE: CPT | Performed by: NURSE PRACTITIONER

## 2023-08-09 PROCEDURE — 99215 OFFICE O/P EST HI 40 MIN: CPT | Performed by: NURSE PRACTITIONER

## 2023-08-09 PROCEDURE — 3017F COLORECTAL CA SCREEN DOC REV: CPT | Performed by: NURSE PRACTITIONER

## 2023-08-09 RX ORDER — EVOLOCUMAB 140 MG/ML
140 INJECTION, SOLUTION SUBCUTANEOUS
Qty: 2 ADJUSTABLE DOSE PRE-FILLED PEN SYRINGE | Refills: 11 | Status: SHIPPED | OUTPATIENT
Start: 2023-08-09

## 2023-08-09 ASSESSMENT — ENCOUNTER SYMPTOMS
SHORTNESS OF BREATH: 1
GASTROINTESTINAL NEGATIVE: 1

## 2023-08-09 NOTE — PROGRESS NOTES
puncture site and hemostasis was obtained. There were no immediate complications. I supervised the sedation with fentanyl and midazolam. An independent trained observer pushed meds at my direction. We monitored the patient's level of consciousness and vital signs/physiologic status throughout the procedure duration. At the end of the procedure, the radial sheath was removed and a TR band was placed over the arteriotomy site. Patient tolerated the procedure well. FINDINGS  Left main - Normal  LAD - aneurysmal in the proximal segment, 80% stenosis in the proximal to mid segment which was treated successfully with PCI under IVUS guidance and placement of a drug-eluting stent with good final result  Left circumflex - 70% stenosis in the midsegment  RCA - not engaged  PCI - Proximal LAD  Pre-PCI stenosis - 80%  Pre-PCI AMISH Flow - III  Post-PCI stenosis - 0%  Post-PCI AMISH Flow - III  Guide catheter - 6Fr XB 3.0  Pre-dilation - 3.0 x 12mm SC balloon  Stent - 3.0 x 38mm Paulina  Post-dilation - 3.5 x  15mm NC, 4.5 x 12mm NC  FINAL DIAGNOSIS  Severe obstructive CAD involving proximal LAD treated with PCI as above  PLAN/RECOMMENDATIONS  Continue aspirin 81 daily, Plavix 75 daily for 1 year, high intensity statin in addition to GDMT for CAD as well as for ischemic cardiomyopathy  If remains stable without significant postprocedure complications including right radial access site complications, can be discharged home later this evening versus tomorrow morning  Close follow-up with outpatient cardiology will be arranged    Coronary angiogram 7/20/2023:  INDICATION  Unstable angina, acute on chronic systolic heart failure  PROCEDURE DESCRIPTION  Risks/benefits/alternatives/outcomes were discussed with patient and/or family in detail and informed consent was obtained. Patient was prepped and draped in the usual sterile fashion. Versed and fentanyl were used for conscious sedation.   Local anesthesia was applied over right neck

## 2023-08-09 NOTE — PATIENT INSTRUCTIONS
Repatha for cholesterol  Jardiance for heart strength  Cardiac rehab  Continue other medications  Follow up in 1 month

## 2023-08-10 ENCOUNTER — TELEPHONE (OUTPATIENT)
Dept: PULMONOLOGY | Age: 68
End: 2023-08-10

## 2023-08-10 RX ORDER — METHYLPREDNISOLONE 4 MG/1
TABLET ORAL
Qty: 1 KIT | Refills: 0 | Status: SHIPPED | OUTPATIENT
Start: 2023-08-10 | End: 2023-08-16

## 2023-08-10 RX ORDER — COLCHICINE 0.6 MG/1
0.6 TABLET ORAL DAILY
Qty: 30 TABLET | Refills: 1 | Status: SHIPPED | OUTPATIENT
Start: 2023-08-10

## 2023-08-10 NOTE — TELEPHONE ENCOUNTER
Appointment canceled for [facility] ([id number])  Visit Type: OFFICE VISIT  Date        Time      Length    Provider                  Department  8/22/2023   11:00 AM  15 mins. Dr. Rajni Cardozo MD       [facility] CL PUL CC SLEEP     Reason for Cancellation: Other     Patient Comments: Had bypass surgery and will reschedule when I get better.  Thank you

## 2023-08-11 ENCOUNTER — TELEPHONE (OUTPATIENT)
Dept: CARDIAC REHAB | Age: 68
End: 2023-08-11

## 2023-08-11 NOTE — TELEPHONE ENCOUNTER
Message left for Ms Sorin Mis with a reminder call of initial appointment for Card Rehab on 8/14/2023 at 11:15.

## 2023-08-12 DIAGNOSIS — I50.20 HFREF (HEART FAILURE WITH REDUCED EJECTION FRACTION) (HCC): ICD-10-CM

## 2023-08-12 DIAGNOSIS — I25.5 ISCHEMIC CARDIOMYOPATHY: ICD-10-CM

## 2023-08-12 DIAGNOSIS — I25.10 CORONARY ARTERY DISEASE INVOLVING NATIVE CORONARY ARTERY OF NATIVE HEART WITHOUT ANGINA PECTORIS: ICD-10-CM

## 2023-08-14 ENCOUNTER — TELEPHONE (OUTPATIENT)
Dept: INTERNAL MEDICINE CLINIC | Age: 68
End: 2023-08-14

## 2023-08-14 ENCOUNTER — TELEPHONE (OUTPATIENT)
Dept: CARDIOLOGY CLINIC | Age: 68
End: 2023-08-14

## 2023-08-14 DIAGNOSIS — I25.5 ISCHEMIC CARDIOMYOPATHY: ICD-10-CM

## 2023-08-14 DIAGNOSIS — I50.20 HFREF (HEART FAILURE WITH REDUCED EJECTION FRACTION) (HCC): ICD-10-CM

## 2023-08-14 DIAGNOSIS — I25.10 CORONARY ARTERY DISEASE INVOLVING NATIVE CORONARY ARTERY OF NATIVE HEART WITHOUT ANGINA PECTORIS: ICD-10-CM

## 2023-08-14 RX ORDER — EVOLOCUMAB 140 MG/ML
140 INJECTION, SOLUTION SUBCUTANEOUS
Qty: 2 ADJUSTABLE DOSE PRE-FILLED PEN SYRINGE | Refills: 11 | Status: SHIPPED | OUTPATIENT
Start: 2023-08-14

## 2023-08-14 RX ORDER — PANTOPRAZOLE SODIUM 40 MG/1
40 TABLET, DELAYED RELEASE ORAL DAILY
Qty: 90 TABLET | Refills: 0 | Status: SHIPPED | OUTPATIENT
Start: 2023-08-14

## 2023-08-14 RX ORDER — FERROUS SULFATE 325(65) MG
TABLET ORAL
Qty: 30 TABLET | Refills: 1 | OUTPATIENT
Start: 2023-08-14

## 2023-08-14 RX ORDER — EVOLOCUMAB 140 MG/ML
140 INJECTION, SOLUTION SUBCUTANEOUS
Qty: 2 ADJUSTABLE DOSE PRE-FILLED PEN SYRINGE | Refills: 11 | OUTPATIENT
Start: 2023-08-14

## 2023-08-14 NOTE — TELEPHONE ENCOUNTER
Medication refused, this is a duplicate request. Heriberto Quinn was just signed for refill on 8/9/23 by NPSONAM during [address].

## 2023-08-14 NOTE — TELEPHONE ENCOUNTER
Daughter will call back with fax number of either [facility] or [facility] to fax new referral for cardiac rehab.    And I resent Repatha to [facility] aware of all of this

## 2023-08-14 NOTE — TELEPHONE ENCOUNTER
Pts daughter presented herself in office today and is requesting nplr send referral for transfer of cardiac rehab to [facility] if possible, if not [facility] due to pt living an hour away and stating if she has to continue this commute she will not continue to go.

## 2023-08-17 ENCOUNTER — TELEPHONE (OUTPATIENT)
Dept: INTERNAL MEDICINE CLINIC | Age: 68
End: 2023-08-17

## 2023-08-17 NOTE — TELEPHONE ENCOUNTER
----- Message from Herve Roque RN sent at 8/17/2023  4:57 PM EDT -----  Noted. We will assess for CC needs. Herve Roque RN BSN  Ambulatory Care Manager   [facility]  [email or web address]  [phone]   ----- Message -----  From: Daniella Van  Sent: 8/3/2023   8:19 AM EDT  To: JAGDEEP Martinez Dr. wanted us to touch base with you regarding social needs for patient. Please advise.

## 2023-08-18 ENCOUNTER — CARE COORDINATION (OUTPATIENT)
Dept: CARE COORDINATION | Age: 68
End: 2023-08-18

## 2023-08-18 NOTE — CARE COORDINATION
ACM made care coordination outreach to patient. Patient informed of the role of ACM and care management and declined any needs at this time. Patient informed ACM that she was originally needing assistance getting rehab set up, however she is scheduled to start rehab at [facility] next week. Patient denied any need for [address] at this time. Patient stated that she has a cane, walker, shower chair and rails in the bathroom. Patient stated no other questions or concerns at the end of the call. Patient provided with [facility] phone number to call for any non-emergent questions or future care coordination needs.

## 2023-08-23 ENCOUNTER — HOSPITAL ENCOUNTER (OUTPATIENT)
Dept: CARDIAC REHAB | Age: 68
Setting detail: THERAPIES SERIES
Discharge: HOME | End: 2023-08-23
Payer: MEDICARE

## 2023-08-23 VITALS
OXYGEN SATURATION: 98 % | HEIGHT: 61 IN | DIASTOLIC BLOOD PRESSURE: 72 MMHG | SYSTOLIC BLOOD PRESSURE: 118 MMHG | BODY MASS INDEX: 30.21 KG/M2 | WEIGHT: 160 LBS | RESPIRATION RATE: 16 BRPM

## 2023-08-23 PROCEDURE — 93798 PHYS/QHP OP CAR RHAB W/ECG: CPT

## 2023-08-23 RX ORDER — UMECLIDINIUM BROMIDE AND VILANTEROL TRIFENATATE 62.5; 25 UG/1; UG/1
POWDER RESPIRATORY (INHALATION)
COMMUNITY
Start: 2023-08-12

## 2023-08-23 ASSESSMENT — PATIENT HEALTH QUESTIONNAIRE - PHQ9
4. FEELING TIRED OR HAVING LITTLE ENERGY: 3
5. POOR APPETITE OR OVEREATING: 1
9. THOUGHTS THAT YOU WOULD BE BETTER OFF DEAD, OR OF HURTING YOURSELF: 0
8. MOVING OR SPEAKING SO SLOWLY THAT OTHER PEOPLE COULD HAVE NOTICED. OR THE OPPOSITE, BEING SO FIGETY OR RESTLESS THAT YOU HAVE BEEN MOVING AROUND A LOT MORE THAN USUAL: 0
1. LITTLE INTEREST OR PLEASURE IN DOING THINGS: 0
SUM OF ALL RESPONSES TO PHQ QUESTIONS 1-9: 7
10. IF YOU CHECKED OFF ANY PROBLEMS, HOW DIFFICULT HAVE THESE PROBLEMS MADE IT FOR YOU TO DO YOUR WORK, TAKE CARE OF THINGS AT HOME, OR GET ALONG WITH OTHER PEOPLE: 3
SUM OF ALL RESPONSES TO PHQ QUESTIONS 1-9: 7
SUM OF ALL RESPONSES TO PHQ9 QUESTIONS 1 & 2: 1
2. FEELING DOWN, DEPRESSED OR HOPELESS: 1
7. TROUBLE CONCENTRATING ON THINGS, SUCH AS READING THE NEWSPAPER OR WATCHING TELEVISION: 0
6. FEELING BAD ABOUT YOURSELF - OR THAT YOU ARE A FAILURE OR HAVE LET YOURSELF OR YOUR FAMILY DOWN: 0
3. TROUBLE FALLING OR STAYING ASLEEP: 2

## 2023-08-23 ASSESSMENT — EXERCISE STRESS TEST
PEAK_RPD: 0.3
PEAK_BP: 128/68
PEAK_HR: 115
PEAK_RPE: 13/20

## 2023-08-23 ASSESSMENT — LIFESTYLE VARIABLES
CIGARETTES_PER_DAY: 5-6
SMOKELESS_TOBACCO: NO

## 2023-08-23 ASSESSMENT — EJECTION FRACTION: EF_VALUE: 25

## 2023-08-23 NOTE — PLAN OF CARE
Cardiopulmonary Rehab Treatment Plan   Name: Hanny Yao Assessment Date: 2023   : 1955 Primary Diagnosis: CABG    Age: 70 y.o. Referring Physician:  Odean Nyhan   MRN: [id number] Primary Care Physician: Vibha Morris MD   Treatment Diagnosis  Treatment Diagnosis 1: CABG  CABG Date: 23  PCI Date: 23  Referral Date: 23  Significant Cardiovascular History: History of heart failure; Peripheral arterial disease; Previous myocardial infarction; Previous PCI  Co-morbidities: Pulmonary disease    Individual Treatment Plan  ITP Visit Type: Initial assessment  1st Date of Exercise : 23  Visit #/Total Visits:   EF%: 25 -30%  Risk Stratification: High  ITP: Exercise; Psychosocial; Tobacco; Nutrition; Education    Exercise   Stages of Change: Preparation  Assisted Devices: None  Pitt Total Score: 15  Test: Six minute walk test    Data Measured Before Walk  Heart Rate: 90  Blood Pressure: 114/74  O2 Saturation: 98  O2 Device: Room air  RPD: 0.5    Data Measured during Walk  Do You Have Shortness of Breath: Yes  Describe Type of Pain You Are Having: pain in hips and legs, taking one rest.  Peak RPE: 13/20  Peak RPD: 0.3    Data Measured Immediately After Walk  Distance Walked (ft): 920 ft  Peak Heart Rate: 115  Peak Blood Pressure: 128/68  Modified Evelio Scale: 3  RPE: 13/20  O2 Saturation: 87 (SpO2 returned to 91% after resting and deep breathing.)  O2 Flow Rate (L/min): 0 l/min    Data Measured at 5 Minutes After Walk  Heart Rate: 94  Blood Pressure: 128/70  SpO2: 99 %  O2 Device: Room air    Exercise Prescription  Mode: Track; Treadmill; Bike; Stepper; Elliptical; Ergometer; Rower  Frequency per week: 3  Duration Per Session: 20-36+minutes  Intensity:  METS: greater than 2.0. Goal: 3-6  RPE: 11-13  Resistance Training: Yes (weights)  Oxygen: titrate 0-6 L NC to maintain SpO2 >90%.     Exercise Blood Pressures  Resting BP: 118/72    Exercise Activity at Home  Type:

## 2023-08-23 NOTE — PROGRESS NOTES
Patient completed cardiac rehab initial assessment and orientation. Patient chose 0915 class and will return on 8/28/23. Patient reports there may be times when she can only come 2 days per week. Discussed attendance policy with patient.

## 2023-08-28 ENCOUNTER — HOSPITAL ENCOUNTER (OUTPATIENT)
Dept: CARDIAC REHAB | Age: 68
Setting detail: THERAPIES SERIES
Discharge: HOME | End: 2023-08-28
Payer: MEDICARE

## 2023-08-28 PROCEDURE — 93798 PHYS/QHP OP CAR RHAB W/ECG: CPT

## 2023-08-30 ENCOUNTER — HOSPITAL ENCOUNTER (OUTPATIENT)
Dept: CARDIAC REHAB | Age: 68
Setting detail: THERAPIES SERIES
Discharge: HOME | End: 2023-08-30
Payer: MEDICARE

## 2023-08-30 PROCEDURE — 93798 PHYS/QHP OP CAR RHAB W/ECG: CPT

## 2023-08-31 ENCOUNTER — OFFICE VISIT (OUTPATIENT)
Dept: INTERNAL MEDICINE CLINIC | Age: 68
End: 2023-08-31

## 2023-08-31 VITALS
BODY MASS INDEX: 30.21 KG/M2 | RESPIRATION RATE: 14 BRPM | WEIGHT: 160 LBS | DIASTOLIC BLOOD PRESSURE: 60 MMHG | SYSTOLIC BLOOD PRESSURE: 108 MMHG | HEIGHT: 61 IN | HEART RATE: 60 BPM

## 2023-08-31 DIAGNOSIS — K80.20 CHOLELITHIASIS WITHOUT CHOLECYSTITIS: ICD-10-CM

## 2023-08-31 DIAGNOSIS — I25.10 MULTIPLE VESSEL CORONARY ARTERY DISEASE: Primary | ICD-10-CM

## 2023-08-31 DIAGNOSIS — M79.605 CHRONIC PAIN OF BOTH LOWER EXTREMITIES: ICD-10-CM

## 2023-08-31 DIAGNOSIS — I25.5 ISCHEMIC CARDIOMYOPATHY: ICD-10-CM

## 2023-08-31 DIAGNOSIS — G89.29 CHRONIC PAIN OF BOTH LOWER EXTREMITIES: ICD-10-CM

## 2023-08-31 DIAGNOSIS — I73.9 PVD (PERIPHERAL VASCULAR DISEASE) (HCC): ICD-10-CM

## 2023-08-31 DIAGNOSIS — D50.0 IRON DEFICIENCY ANEMIA DUE TO CHRONIC BLOOD LOSS: ICD-10-CM

## 2023-08-31 DIAGNOSIS — M79.89 SWELLING OF BOTH LOWER EXTREMITIES: ICD-10-CM

## 2023-08-31 DIAGNOSIS — I50.42 CHRONIC COMBINED SYSTOLIC AND DIASTOLIC CONGESTIVE HEART FAILURE (HCC): ICD-10-CM

## 2023-08-31 DIAGNOSIS — I25.810 CORONARY ARTERY DISEASE INVOLVING CORONARY BYPASS GRAFT OF NATIVE HEART WITHOUT ANGINA PECTORIS: ICD-10-CM

## 2023-08-31 DIAGNOSIS — M79.604 CHRONIC PAIN OF BOTH LOWER EXTREMITIES: ICD-10-CM

## 2023-08-31 PROCEDURE — G8427 DOCREV CUR MEDS BY ELIG CLIN: HCPCS | Performed by: NURSE PRACTITIONER

## 2023-08-31 PROCEDURE — 4004F PT TOBACCO SCREEN RCVD TLK: CPT | Performed by: NURSE PRACTITIONER

## 2023-08-31 PROCEDURE — G8400 PT W/DXA NO RESULTS DOC: HCPCS | Performed by: NURSE PRACTITIONER

## 2023-08-31 PROCEDURE — 1123F ACP DISCUSS/DSCN MKR DOCD: CPT | Performed by: NURSE PRACTITIONER

## 2023-08-31 PROCEDURE — 1090F PRES/ABSN URINE INCON ASSESS: CPT | Performed by: NURSE PRACTITIONER

## 2023-08-31 PROCEDURE — 3017F COLORECTAL CA SCREEN DOC REV: CPT | Performed by: NURSE PRACTITIONER

## 2023-08-31 PROCEDURE — 99215 OFFICE O/P EST HI 40 MIN: CPT | Performed by: NURSE PRACTITIONER

## 2023-08-31 PROCEDURE — G8417 CALC BMI ABV UP PARAM F/U: HCPCS | Performed by: NURSE PRACTITIONER

## 2023-08-31 RX ORDER — FERROUS SULFATE 325(65) MG
325 TABLET ORAL
Qty: 90 TABLET | Refills: 0 | Status: SHIPPED | OUTPATIENT
Start: 2023-08-31

## 2023-08-31 ASSESSMENT — ENCOUNTER SYMPTOMS: SHORTNESS OF BREATH: 1

## 2023-09-01 ENCOUNTER — HOSPITAL ENCOUNTER (OUTPATIENT)
Dept: CARDIAC REHAB | Age: 68
Setting detail: THERAPIES SERIES
End: 2023-09-01
Payer: MEDICARE

## 2023-09-06 ENCOUNTER — HOSPITAL ENCOUNTER (OUTPATIENT)
Dept: CARDIAC REHAB | Age: 68
Setting detail: THERAPIES SERIES
Discharge: HOME | End: 2023-09-06
Payer: MEDICARE

## 2023-09-06 PROCEDURE — 93798 PHYS/QHP OP CAR RHAB W/ECG: CPT

## 2023-09-08 ENCOUNTER — HOSPITAL ENCOUNTER (OUTPATIENT)
Dept: CARDIAC REHAB | Age: 68
Setting detail: THERAPIES SERIES
Discharge: HOME | End: 2023-09-08
Payer: MEDICARE

## 2023-09-08 PROCEDURE — 93798 PHYS/QHP OP CAR RHAB W/ECG: CPT

## 2023-09-11 ENCOUNTER — HOSPITAL ENCOUNTER (OUTPATIENT)
Dept: CARDIAC REHAB | Age: 68
Setting detail: THERAPIES SERIES
Discharge: HOME | End: 2023-09-11
Payer: MEDICARE

## 2023-09-11 PROCEDURE — 93798 PHYS/QHP OP CAR RHAB W/ECG: CPT

## 2023-09-11 RX ORDER — METOPROLOL SUCCINATE 25 MG/1
25 TABLET, EXTENDED RELEASE ORAL DAILY
Qty: 90 TABLET | Refills: 0 | Status: SHIPPED | OUTPATIENT
Start: 2023-09-11

## 2023-09-11 ASSESSMENT — LIFESTYLE VARIABLES
CIGARETTES_PER_DAY: 5-6
SMOKELESS_TOBACCO: NO

## 2023-09-13 ENCOUNTER — HOSPITAL ENCOUNTER (OUTPATIENT)
Dept: CARDIAC REHAB | Age: 68
Setting detail: THERAPIES SERIES
End: 2023-09-13
Payer: MEDICARE

## 2023-09-13 PROBLEM — E11.9 TYPE 2 DIABETES MELLITUS (HCC): Status: ACTIVE | Noted: 2023-09-13

## 2023-09-15 ENCOUNTER — HOSPITAL ENCOUNTER (OUTPATIENT)
Dept: CARDIAC REHAB | Age: 68
Setting detail: THERAPIES SERIES
Discharge: HOME | End: 2023-09-15
Payer: MEDICARE

## 2023-09-15 PROCEDURE — 93798 PHYS/QHP OP CAR RHAB W/ECG: CPT

## 2023-09-18 ENCOUNTER — HOSPITAL ENCOUNTER (OUTPATIENT)
Dept: CARDIAC REHAB | Age: 68
Setting detail: THERAPIES SERIES
Discharge: HOME | End: 2023-09-18
Payer: MEDICARE

## 2023-09-18 PROCEDURE — 93798 PHYS/QHP OP CAR RHAB W/ECG: CPT

## 2023-09-18 NOTE — PLAN OF CARE
Intervention  Nurse/Patient Discussion: Yes (encouraged more vegetables, less sugar and carbs.)    Nutrition Education  Education: -- (Thrive: Choose healthy foods; Develop a healthy relationship with foods; Eat the mediterranean way)    Nutrition Target Goals  Target Goals: Weight loss of 5-10%    Dietary Outcomes  Daily # Fruits/Vegetables: 0 (Encouraged eating more fruits and vegetables; family is also encouraging healthier diet.)    Education  Learning Barrier: Ready to learn    Education Target Goals  Target Goals: Risk factors    Patient Education   Education: -- (Thrive: Take your medications)  Hypertension: Yes  Hypertension Controlled: Yes  Is BP WDL: Yes  BP Meds: Metoprolol succinate, Entresto    ASA Prescribed: Yes  ASA Adherent: Yes  Antiplatelet Prescribed: Yes  Antiplatelet Adherent: Yes  Statins Prescribed: No/exception (allergy. prescribed Alirocumab but it has not come in the mail.)  Education Target Goals  Target Goals: Risk factors    Staff Treatment Goals  Goals: Tobacco  Tobacco Goal: smoking cessation  Tobacco Goal Status: Initial  Tobacco Goal Comments: patient quit smoking after surgery but was around her friends and returned to smoking. patient thinking about quitting again. patient verbalizes family encourage her to quit. grandkids help motivate her to quit. patient considering going to E cigarette to get away from cigarettes. patient not ready to set a quit date. patient's triggers are being around friends and stress. discussed how smoking causes heart disease and PVD and importance of quiting tobacco. discussed patches and gum to help patient quit. Tobacco Goal Assessment: Problems/barriers; Recommendations              Provider Review and Approval of this ITP    The above treatment plan and goals have been set for your patient during Cardiac Rehabilitation.  Please review and Electronically Cosign        Electronically signed by Lawrence York RN on 9/18/23 at 3:33 PM EDT

## 2023-09-20 ENCOUNTER — HOSPITAL ENCOUNTER (OUTPATIENT)
Dept: CARDIAC REHAB | Age: 68
Setting detail: THERAPIES SERIES
Discharge: HOME | End: 2023-09-20
Payer: MEDICARE

## 2023-09-20 PROCEDURE — 93798 PHYS/QHP OP CAR RHAB W/ECG: CPT

## 2023-09-22 ENCOUNTER — HOSPITAL ENCOUNTER (OUTPATIENT)
Dept: CARDIAC REHAB | Age: 68
Setting detail: THERAPIES SERIES
Discharge: HOME | End: 2023-09-22
Payer: MEDICARE

## 2023-09-22 PROCEDURE — 93798 PHYS/QHP OP CAR RHAB W/ECG: CPT

## 2023-09-25 ENCOUNTER — HOSPITAL ENCOUNTER (OUTPATIENT)
Dept: CARDIAC REHAB | Age: 68
Setting detail: THERAPIES SERIES
Discharge: HOME | End: 2023-09-25
Payer: MEDICARE

## 2023-09-25 PROCEDURE — 93798 PHYS/QHP OP CAR RHAB W/ECG: CPT

## 2023-09-27 ENCOUNTER — HOSPITAL ENCOUNTER (OUTPATIENT)
Dept: CARDIAC REHAB | Age: 68
Setting detail: THERAPIES SERIES
Discharge: HOME | End: 2023-09-27
Payer: MEDICARE

## 2023-09-27 PROCEDURE — 93798 PHYS/QHP OP CAR RHAB W/ECG: CPT

## 2023-09-28 ENCOUNTER — OFFICE VISIT (OUTPATIENT)
Dept: INTERNAL MEDICINE CLINIC | Age: 68
End: 2023-09-28

## 2023-09-28 VITALS
DIASTOLIC BLOOD PRESSURE: 64 MMHG | RESPIRATION RATE: 16 BRPM | HEART RATE: 72 BPM | HEIGHT: 61 IN | WEIGHT: 160 LBS | SYSTOLIC BLOOD PRESSURE: 116 MMHG | BODY MASS INDEX: 30.21 KG/M2

## 2023-09-28 DIAGNOSIS — G89.18 POST-OP PAIN: ICD-10-CM

## 2023-09-28 DIAGNOSIS — Z23 NEED FOR INFLUENZA VACCINATION: Primary | ICD-10-CM

## 2023-09-28 PROCEDURE — 99215 OFFICE O/P EST HI 40 MIN: CPT | Performed by: NURSE PRACTITIONER

## 2023-09-28 PROCEDURE — G8400 PT W/DXA NO RESULTS DOC: HCPCS | Performed by: NURSE PRACTITIONER

## 2023-09-28 PROCEDURE — G0008 ADMIN INFLUENZA VIRUS VAC: HCPCS | Performed by: NURSE PRACTITIONER

## 2023-09-28 PROCEDURE — 90662 IIV NO PRSV INCREASED AG IM: CPT | Performed by: NURSE PRACTITIONER

## 2023-09-28 PROCEDURE — 1123F ACP DISCUSS/DSCN MKR DOCD: CPT | Performed by: NURSE PRACTITIONER

## 2023-09-28 PROCEDURE — 4004F PT TOBACCO SCREEN RCVD TLK: CPT | Performed by: NURSE PRACTITIONER

## 2023-09-28 PROCEDURE — G8417 CALC BMI ABV UP PARAM F/U: HCPCS | Performed by: NURSE PRACTITIONER

## 2023-09-28 PROCEDURE — 3017F COLORECTAL CA SCREEN DOC REV: CPT | Performed by: NURSE PRACTITIONER

## 2023-09-28 PROCEDURE — 1090F PRES/ABSN URINE INCON ASSESS: CPT | Performed by: NURSE PRACTITIONER

## 2023-09-28 PROCEDURE — G8427 DOCREV CUR MEDS BY ELIG CLIN: HCPCS | Performed by: NURSE PRACTITIONER

## 2023-09-28 RX ORDER — FUROSEMIDE 40 MG/1
40 TABLET ORAL 2 TIMES DAILY
Qty: 180 TABLET | Refills: 1 | Status: SHIPPED | OUTPATIENT
Start: 2023-09-28 | End: 2023-09-28

## 2023-09-28 RX ORDER — METOPROLOL SUCCINATE 25 MG/1
25 TABLET, EXTENDED RELEASE ORAL DAILY
Qty: 90 TABLET | Refills: 1 | Status: SHIPPED | OUTPATIENT
Start: 2023-09-28

## 2023-09-28 RX ORDER — PANTOPRAZOLE SODIUM 40 MG/1
40 TABLET, DELAYED RELEASE ORAL DAILY
Qty: 90 TABLET | Refills: 1 | Status: SHIPPED | OUTPATIENT
Start: 2023-09-28

## 2023-09-28 RX ORDER — FERROUS SULFATE 325(65) MG
325 TABLET ORAL 2 TIMES DAILY WITH MEALS
Qty: 180 TABLET | Refills: 1 | Status: SHIPPED | OUTPATIENT
Start: 2023-09-28

## 2023-09-28 RX ORDER — ASPIRIN 81 MG/1
81 TABLET ORAL DAILY
Qty: 90 TABLET | Refills: 1 | Status: SHIPPED | OUTPATIENT
Start: 2023-09-28

## 2023-09-28 RX ORDER — SPIRONOLACTONE 25 MG/1
25 TABLET ORAL DAILY
Qty: 90 TABLET | Refills: 1 | Status: SHIPPED | OUTPATIENT
Start: 2023-09-28

## 2023-09-28 RX ORDER — PRAMIPEXOLE DIHYDROCHLORIDE 0.25 MG/1
0.25 TABLET ORAL NIGHTLY
Qty: 90 TABLET | Refills: 1 | Status: SHIPPED | OUTPATIENT
Start: 2023-09-28

## 2023-09-28 RX ORDER — OXYCODONE HYDROCHLORIDE 5 MG/1
5 TABLET ORAL EVERY 6 HOURS PRN
Qty: 30 TABLET | Refills: 0 | Status: SHIPPED | OUTPATIENT
Start: 2023-09-28 | End: 2023-10-06

## 2023-09-28 RX ORDER — CLOPIDOGREL BISULFATE 75 MG/1
75 TABLET ORAL DAILY
Qty: 90 TABLET | Refills: 1 | Status: SHIPPED | OUTPATIENT
Start: 2023-09-28

## 2023-09-28 RX ORDER — FUROSEMIDE 40 MG/1
40 TABLET ORAL DAILY
Qty: 90 TABLET | Refills: 1 | Status: SHIPPED | OUTPATIENT
Start: 2023-09-28

## 2023-09-28 NOTE — PROGRESS NOTES
Chief Complaint:   Karis Tripp is a 70 y.o. female who presents for   Chief Complaint   Patient presents with    Follow-up        HPI    70 y.o. female with hx of CAD, HTN, hyperlipidemia, COPD, RLS , PAD presents for follow up. Multiple admissions recently sec to her CAD see below      CAD with hx of multiple stents , s.p recent CABG in 5/23 with new stenting of graft in 7/23   Pt also  Rt  Distal SFA to anterior tibial bypass in 6/23 in between cardiac procedures   Reports she has gone through a lot   She is doing cardiac rehab. Doing well with this. Getting stronger. No palpitations. Compliant with meds and diuretics but not compliant to diet and water intake   Taking ASA, plavix , low dose BB, entresto   Her chest is still hurting. Still taking Oxycodone. It is hard for her to lay down and sleep at night. It is so sore. Feeling like wires are sticking. Still having shooting pains in her legs. Reports ongoing arthritic pains and oxycodone given post op. This is helping and wishes to continue   She saw Dr. Everardo Sanders. Her breast bone has not gone back together. This is what is causing her pain. Surgery is not an option at this point. She does not wish to go through another surgery either. Recommended seeing pain management. Getting stronger doing the rehab. Having pain to RLE as well. She continues to smoke but has cut down since last 3 months with cardiac issues     Hx of COPD  KAMINI pulmonary nodule  -on Anoro. using IBD prn   Sees Dr. Munir Collier     RLS  -on Mirapex        Hyperlipidemia   -cannot tolerate statins. Getting Praulent injections. Lives alone, daughter helps  No depression issues  No recent falls    Review of Systems  Review of Systems   Cardiovascular:  Positive for chest pain.          Allergies  Ibuprofen, Niacin and related, Statins, and Zetia [ezetimibe]      Vitals  /64 (Site: Right Upper Arm, Position: Sitting)   Pulse 72   Resp 16   Ht 5' 1\" (1.549 m)

## 2023-09-29 ENCOUNTER — HOSPITAL ENCOUNTER (OUTPATIENT)
Dept: CARDIAC REHAB | Age: 68
Setting detail: THERAPIES SERIES
Discharge: HOME | End: 2023-09-29
Payer: MEDICARE

## 2023-09-29 PROCEDURE — 93798 PHYS/QHP OP CAR RHAB W/ECG: CPT

## 2023-10-02 ENCOUNTER — HOSPITAL ENCOUNTER (OUTPATIENT)
Dept: CARDIAC REHAB | Age: 68
Setting detail: THERAPIES SERIES
Discharge: HOME | End: 2023-10-02
Payer: MEDICARE

## 2023-10-02 PROCEDURE — 93798 PHYS/QHP OP CAR RHAB W/ECG: CPT

## 2023-10-03 ENCOUNTER — TELEPHONE (OUTPATIENT)
Dept: CARDIOLOGY CLINIC | Age: 68
End: 2023-10-03

## 2023-10-03 NOTE — TELEPHONE ENCOUNTER
Pts [facility] called for a pa for Repatha or switching to praulent but she is unsure if that will need a pa as well.  Ph is [phone]  Fx is [phone]

## 2023-10-04 ENCOUNTER — HOSPITAL ENCOUNTER (OUTPATIENT)
Dept: CARDIAC REHAB | Age: 68
Setting detail: THERAPIES SERIES
Discharge: HOME | End: 2023-10-04
Payer: MEDICARE

## 2023-10-04 PROCEDURE — 93798 PHYS/QHP OP CAR RHAB W/ECG: CPT

## 2023-10-04 NOTE — TELEPHONE ENCOUNTER
Pharmacy called mhi and stated that the pt brought them a pa for the purulent today and said to disregard the message.

## 2023-10-06 ENCOUNTER — HOSPITAL ENCOUNTER (OUTPATIENT)
Dept: CARDIAC REHAB | Age: 68
Setting detail: THERAPIES SERIES
Discharge: HOME | End: 2023-10-06
Payer: MEDICARE

## 2023-10-06 PROCEDURE — 93798 PHYS/QHP OP CAR RHAB W/ECG: CPT

## 2023-10-09 ENCOUNTER — HOSPITAL ENCOUNTER (OUTPATIENT)
Dept: CARDIAC REHAB | Age: 68
Setting detail: THERAPIES SERIES
Discharge: HOME | End: 2023-10-09
Payer: MEDICARE

## 2023-10-09 PROCEDURE — 93798 PHYS/QHP OP CAR RHAB W/ECG: CPT

## 2023-10-11 ENCOUNTER — HOSPITAL ENCOUNTER (OUTPATIENT)
Dept: CARDIAC REHAB | Age: 68
Setting detail: THERAPIES SERIES
Discharge: HOME | End: 2023-10-11
Payer: MEDICARE

## 2023-10-11 PROCEDURE — 93798 PHYS/QHP OP CAR RHAB W/ECG: CPT

## 2023-10-11 ASSESSMENT — EJECTION FRACTION: EF_VALUE: 25

## 2023-10-13 ENCOUNTER — HOSPITAL ENCOUNTER (OUTPATIENT)
Dept: CARDIAC REHAB | Age: 68
Setting detail: THERAPIES SERIES
Discharge: HOME | End: 2023-10-13
Payer: MEDICARE

## 2023-10-13 ENCOUNTER — TELEPHONE (OUTPATIENT)
Dept: CARDIAC REHAB | Age: 68
End: 2023-10-13

## 2023-10-16 ENCOUNTER — HOSPITAL ENCOUNTER (OUTPATIENT)
Dept: CARDIAC REHAB | Age: 68
Setting detail: THERAPIES SERIES
Discharge: HOME | End: 2023-10-16
Payer: MEDICARE

## 2023-10-16 PROCEDURE — 93798 PHYS/QHP OP CAR RHAB W/ECG: CPT

## 2023-10-18 ENCOUNTER — HOSPITAL ENCOUNTER (OUTPATIENT)
Dept: CARDIAC REHAB | Age: 68
Setting detail: THERAPIES SERIES
Discharge: HOME | End: 2023-10-18
Payer: MEDICARE

## 2023-10-18 PROCEDURE — 93798 PHYS/QHP OP CAR RHAB W/ECG: CPT

## 2023-10-20 ENCOUNTER — HOSPITAL ENCOUNTER (OUTPATIENT)
Dept: CARDIAC REHAB | Age: 68
Setting detail: THERAPIES SERIES
Discharge: HOME | End: 2023-10-20
Payer: MEDICARE

## 2023-10-20 PROCEDURE — 93798 PHYS/QHP OP CAR RHAB W/ECG: CPT

## 2023-10-23 ENCOUNTER — HOSPITAL ENCOUNTER (OUTPATIENT)
Dept: VASCULAR LAB | Age: 68
Discharge: HOME | End: 2023-10-23
Payer: MEDICARE

## 2023-10-23 ENCOUNTER — HOSPITAL ENCOUNTER (OUTPATIENT)
Dept: CARDIAC REHAB | Age: 68
Setting detail: THERAPIES SERIES
Discharge: HOME | End: 2023-10-23
Payer: MEDICARE

## 2023-10-23 DIAGNOSIS — Z95.828 H/O EXTREMITY BYPASS GRAFT: ICD-10-CM

## 2023-10-23 DIAGNOSIS — I73.9 PVD (PERIPHERAL VASCULAR DISEASE) (HCC): ICD-10-CM

## 2023-10-23 PROCEDURE — 93926 LOWER EXTREMITY STUDY: CPT

## 2023-10-23 NOTE — PROGRESS NOTES
Patient came to cardiac rehab and reports she was not in class today because she was doing a CT scan. Patient declined staying to exercise and will return on 10/25.

## 2023-10-25 ENCOUNTER — HOSPITAL ENCOUNTER (OUTPATIENT)
Dept: CARDIAC REHAB | Age: 68
Setting detail: THERAPIES SERIES
Discharge: HOME | End: 2023-10-25
Payer: MEDICARE

## 2023-10-25 ENCOUNTER — TELEPHONE (OUTPATIENT)
Dept: CARDIOLOGY CLINIC | Age: 68
End: 2023-10-25

## 2023-10-25 PROCEDURE — 93798 PHYS/QHP OP CAR RHAB W/ECG: CPT

## 2023-10-25 NOTE — TELEPHONE ENCOUNTER
Left message for patient regarding test results per Dr Lan Briscoe. Needs lower extremity angiogram. Left her a message to call me.  Pamlr

## 2023-10-26 ENCOUNTER — TELEPHONE (OUTPATIENT)
Dept: CARDIOLOGY CLINIC | Age: 68
End: 2023-10-26

## 2023-10-26 NOTE — TELEPHONE ENCOUNTER
Patient called me back to say that Tuesday Oct 31 will work just fine for her angiogram/She is to arrive at 6:30am and npo after midnight.  Pamberenice

## 2023-10-26 NOTE — TELEPHONE ENCOUNTER
Patient returned my call regarding scheduling an angiogram on right leg per Dr Reid Lamb. Told her we can do it on Oct 31 at 7:30am as arrive at 6:30am and she is checking her ride and getting back to me.  Zenaida

## 2023-10-27 ENCOUNTER — HOSPITAL ENCOUNTER (OUTPATIENT)
Dept: CARDIAC REHAB | Age: 68
Setting detail: THERAPIES SERIES
Discharge: HOME | End: 2023-10-27
Payer: MEDICARE

## 2023-10-27 PROCEDURE — 93798 PHYS/QHP OP CAR RHAB W/ECG: CPT

## 2023-10-30 ENCOUNTER — TELEPHONE (OUTPATIENT)
Dept: VASCULAR SURGERY | Age: 68
End: 2023-10-30

## 2023-10-30 ENCOUNTER — HOSPITAL ENCOUNTER (OUTPATIENT)
Dept: CARDIAC REHAB | Age: 68
Setting detail: THERAPIES SERIES
Discharge: HOME | End: 2023-10-30
Payer: MEDICARE

## 2023-10-30 PROCEDURE — 93798 PHYS/QHP OP CAR RHAB W/ECG: CPT

## 2023-10-30 NOTE — TELEPHONE ENCOUNTER
Called patient as reminder of her procedure. Arrive at 6:30am for a 7:30am time. Npo after midnight.  Zenaida

## 2023-10-31 ENCOUNTER — HOSPITAL ENCOUNTER (OUTPATIENT)
Dept: CARDIAC CATH/INVASIVE PROCEDURES | Age: 68
Discharge: HOME | End: 2023-10-31
Attending: SURGERY | Admitting: SURGERY
Payer: MEDICARE

## 2023-10-31 VITALS
DIASTOLIC BLOOD PRESSURE: 52 MMHG | RESPIRATION RATE: 16 BRPM | HEART RATE: 81 BPM | SYSTOLIC BLOOD PRESSURE: 110 MMHG | OXYGEN SATURATION: 96 %

## 2023-10-31 DIAGNOSIS — I73.9 PVD (PERIPHERAL VASCULAR DISEASE) (HCC): Primary | ICD-10-CM

## 2023-10-31 PROBLEM — T82.858A BYPASS GRAFT STENOSIS (HCC): Status: ACTIVE | Noted: 2023-10-31

## 2023-10-31 LAB
ANION GAP SERPL CALCULATED.3IONS-SCNC: 12 MMOL/L (ref 3–16)
BUN SERPL-MCNC: 24 MG/DL (ref 7–20)
CALCIUM SERPL-MCNC: 9.4 MG/DL (ref 8.3–10.6)
CHLORIDE SERPL-SCNC: 100 MMOL/L (ref 99–110)
CO2 SERPL-SCNC: 25 MMOL/L (ref 21–32)
CREAT SERPL-MCNC: 1 MG/DL (ref 0.6–1.2)
DEPRECATED RDW RBC AUTO: 17.1 % (ref 12.4–15.4)
GFR SERPLBLD CREATININE-BSD FMLA CKD-EPI: >60 ML/MIN/{1.73_M2}
GLUCOSE SERPL-MCNC: 98 MG/DL (ref 70–99)
HCT VFR BLD AUTO: 42.2 % (ref 36–48)
HGB BLD-MCNC: 13.8 G/DL (ref 12–16)
MCH RBC QN AUTO: 28.4 PG (ref 26–34)
MCHC RBC AUTO-ENTMCNC: 32.8 G/DL (ref 31–36)
MCV RBC AUTO: 86.7 FL (ref 80–100)
PLATELET # BLD AUTO: 281 K/UL (ref 135–450)
PMV BLD AUTO: 7.1 FL (ref 5–10.5)
POTASSIUM SERPL-SCNC: 4.4 MMOL/L (ref 3.5–5.1)
RBC # BLD AUTO: 4.86 M/UL (ref 4–5.2)
SODIUM SERPL-SCNC: 137 MMOL/L (ref 136–145)
WBC # BLD AUTO: 8.3 K/UL (ref 4–11)

## 2023-10-31 PROCEDURE — 99152 MOD SED SAME PHYS/QHP 5/>YRS: CPT | Performed by: SURGERY

## 2023-10-31 PROCEDURE — C1769 GUIDE WIRE: HCPCS | Performed by: SURGERY

## 2023-10-31 PROCEDURE — C1725 CATH, TRANSLUMIN NON-LASER: HCPCS | Performed by: SURGERY

## 2023-10-31 PROCEDURE — C1894 INTRO/SHEATH, NON-LASER: HCPCS | Performed by: SURGERY

## 2023-10-31 PROCEDURE — 80048 BASIC METABOLIC PNL TOTAL CA: CPT

## 2023-10-31 PROCEDURE — 75710 ARTERY X-RAYS ARM/LEG: CPT

## 2023-10-31 PROCEDURE — 76937 US GUIDE VASCULAR ACCESS: CPT | Performed by: SURGERY

## 2023-10-31 PROCEDURE — 85027 COMPLETE CBC AUTOMATED: CPT

## 2023-10-31 PROCEDURE — C1760 CLOSURE DEV, VASC: HCPCS | Performed by: SURGERY

## 2023-10-31 PROCEDURE — 37228 PR REVSC OPN/PRQ TIB/PERO W/ANGIOPLASTY UNI: CPT | Performed by: SURGERY

## 2023-10-31 PROCEDURE — 75774 ARTERY X-RAY EACH VESSEL: CPT

## 2023-10-31 PROCEDURE — 6360000002 HC RX W HCPCS: Performed by: SURGERY

## 2023-10-31 PROCEDURE — 37228 HC TIB PER TERRITORY PLASTY: CPT

## 2023-10-31 PROCEDURE — 75710 ARTERY X-RAYS ARM/LEG: CPT | Performed by: SURGERY

## 2023-10-31 PROCEDURE — 2709999900 HC NON-CHARGEABLE SUPPLY: Performed by: SURGERY

## 2023-10-31 RX ORDER — FENTANYL CITRATE 50 UG/ML
25 INJECTION, SOLUTION INTRAMUSCULAR; INTRAVENOUS ONCE
Status: COMPLETED | OUTPATIENT
Start: 2023-10-31 | End: 2023-10-31

## 2023-10-31 RX ORDER — HEPARIN SODIUM 1000 [USP'U]/ML
5000 INJECTION, SOLUTION INTRAVENOUS; SUBCUTANEOUS ONCE
Status: COMPLETED | OUTPATIENT
Start: 2023-10-31 | End: 2023-10-31

## 2023-10-31 RX ORDER — MIDAZOLAM HYDROCHLORIDE 1 MG/ML
1 INJECTION INTRAMUSCULAR; INTRAVENOUS ONCE
Status: COMPLETED | OUTPATIENT
Start: 2023-10-31 | End: 2023-10-31

## 2023-10-31 RX ORDER — PROTAMINE SULFATE 10 MG/ML
50 INJECTION, SOLUTION INTRAVENOUS ONCE
Status: COMPLETED | OUTPATIENT
Start: 2023-10-31 | End: 2023-10-31

## 2023-10-31 RX ADMIN — HEPARIN SODIUM 5000 UNITS: 1000 INJECTION, SOLUTION INTRAVENOUS; SUBCUTANEOUS at 07:52

## 2023-10-31 RX ADMIN — FENTANYL CITRATE 25 MCG: 50 INJECTION, SOLUTION INTRAMUSCULAR; INTRAVENOUS at 07:43

## 2023-10-31 RX ADMIN — MIDAZOLAM HYDROCHLORIDE 1 MG: 1 INJECTION INTRAMUSCULAR; INTRAVENOUS at 07:44

## 2023-10-31 RX ADMIN — PROTAMINE SULFATE 50 MG: 10 INJECTION, SOLUTION INTRAVENOUS at 08:13

## 2023-10-31 NOTE — H&P
History and Physical / Pre-Sedation Assessment    Patient:  Purvi Gore  :   1955    Intended Procedure: RLE angiogram with intervention      HPI: Bypass graft stenosis noted on routine duplex. Nurses notes reviewed and agreed. Medications reviewed  Allergies:   Ibuprofen, Niacin and related, Statins, and Zetia [ezetimibe]        Physical Exam:   CURRENT VITALS:  There were no vitals taken for this visit. Airway:Normal  Cardiac:Normal  Pulmonary:Normal  Abdomen:Normal        Pre-Procedure Assessment/Plan:  ASA 2 - Patient with mild systemic disease with no functional limitations    Mallampati Airway Assessment:  Mallampati Class II - (soft palate, fauces & uvula are visible)    Level of Sedation Plan: Moderate sedation    Post Procedure plan: Return to same level of care    I assessed the patient and find that the patient is in satisfactory condition to proceed with the planned procedure and sedation plan. I have explained the risk, benefits, and alternatives to the procedure. The patient understands and agrees to proceed.   Yes    Yara Christian       RLE angiogr

## 2023-10-31 NOTE — PROGRESS NOTES
Family came out of room and stated that pt. Was trying to reposition herself and groin site was bleeding. Checked site, no hematoma but had a constant slow drip of blood. Held pressure for 10 minutes. Site stopped oozing. Repositioned pt. Reminded pt. Not to turn or move without assistance. Stated understanding.

## 2023-10-31 NOTE — DISCHARGE INSTRUCTIONS
Cath Lab at  [facility]    Discharge Instructions        10/31/2023  Mendez Batista   Date of Birth 1955       Activity:  No driving for 24 hours. No Tub baths, swimming or hot tubs for 5 days. In 24 hours you may remove dressing and shower. Wash site with soap and water and leave open to air. No lotions, powders or ointments near site for 5 days. No lifting over 5 pounds for 5 days. Return to work/school in 5 days unless instructed otherwise by your doctor. Diet:  Resume previous diet unless instructed otherwise by your doctor, if so general guidelines for a cardiac diet will be provided to you. Drink extra non-alcoholic/decaffienated fluids for first 24 hours after your procedure. Groin Management:  If you have stairs to walk up, pretend you have a cast on the affected leg walk up them without bending affected leg. When you go home go to the bed or sofa, do not get up except to go to the bathroom. Avoid strenuous activity for 5 days. For example, lifting heavy objects greater than 10 lbs, bicycling, jogging, climbing stairs, or walking long distances. If bleeding occurs from the site or a hematoma (lump), begins to increase in size, apply pressure directly over the site and call 911 to return to the hospital.     Special Instructions:  Report any coolness or numbness in the leg where the cath was done to the MD or call 911  Report any chills, fever, itching, red bumps or rash. Report any of the following to the MD: drainage from the cath site, redness and/or swelling at the site, increased tenderness at the site. If you are currently taking Metformin or Metformin combination medications for Diabetes, hold your dose for 48 hours after your procedure. Sedation Discharge Instructions: For the next 24 hours do not drive a car, operate machinery, power tools or kitchen appliances. Do not drink alcohol; including beer or wine.   Do not make any important decisions or sign

## 2023-10-31 NOTE — PROGRESS NOTES
Vascular    RLE angiogram with angioplasty high grade distal graft stenosis performed. Note dictated.

## 2023-11-03 ENCOUNTER — HOSPITAL ENCOUNTER (OUTPATIENT)
Dept: CARDIAC REHAB | Age: 68
Setting detail: THERAPIES SERIES
Discharge: HOME | End: 2023-11-03
Payer: MEDICARE

## 2023-11-06 ENCOUNTER — HOSPITAL ENCOUNTER (OUTPATIENT)
Dept: CARDIAC REHAB | Age: 68
Setting detail: THERAPIES SERIES
Discharge: HOME | End: 2023-11-06
Payer: MEDICARE

## 2023-11-06 PROCEDURE — 93798 PHYS/QHP OP CAR RHAB W/ECG: CPT

## 2023-11-06 NOTE — PLAN OF CARE
not using treadmill at home. Walking currently outside.)    Exercise Education  Education: -- (Nutrition and s/s of CVA)    Psychosocial  Stages of Change: Action  [facility] Total Score: 28  PHQ-9 Total Score: 7    Psychosocial Intervention  Currently Taking Psychotropic Meds: No    Psychosocial Education  Education: Relaxation techniques; Coping techniques; Tobacco dangers; Cardiac meds; Impact self care behaviors on health    Psychosocial Target Goals  Target Goal(s): Assess presence or absence of depression using a valid screening tool  Uses Stress Mgmt Techniques: -- (enjoys sewing, gardening, and grandkids.)    Tobacco  Stages of Change: Contemplate  Tobacco Use: (S) Yes  Quit: -- (patient quit in May 2023 but then was around friends and started smoking again.)  Date Started: 01/01/71  Quit Date Set: not ready to set quit date. # Cigarette Smoked/Day: patient is still trying to cut down on number of cigarettes per day. Smokeless Tobacco Use: No    Tobacco Cessation Intervention  Smoking Cessation Referral: Yes  Tobacco Adjunct: Yes    Tobacco Education  Resource Information Provided: -- (Reminded pt again to return smoking packet, offered new one if previous one lost.  Stated old one still at home on counter and forgets to bring it.)  Tobacco Triggers: -- (drinks coffee in morning with cigarette)    Nutrition  Stages of Change: Contemplate  Diabetes: No  HgbA1c: 6.1  HgbA1c Date: 05/19/23  Diabetes Med(s): Jardiance 10 mg. Lipids  Date Lipids Drawn: 05/19/23  Total: 276  HDL: 41  LDL: 207  Triglycerides: 139    Weight Management  Weight : 72.6 kg (160 lb)  Height : 1.549 m (5' 1\")  BMI: 30.29  Alcohol: None  Rate Your Plate Total Score: 39    Nutrition Intervention  Nurse/Patient Discussion: Yes (Instruct pt on low Na+ diet, to not add salt to food (pt states she is), and to minimize processed/pre cooked meals.  Pt verbalizes understanding.)  Nutrition Goals: Pt goal is to increase vegetable

## 2023-11-08 ENCOUNTER — HOSPITAL ENCOUNTER (OUTPATIENT)
Dept: CARDIAC REHAB | Age: 68
Setting detail: THERAPIES SERIES
Discharge: HOME | End: 2023-11-08
Payer: MEDICARE

## 2023-11-08 PROCEDURE — 93798 PHYS/QHP OP CAR RHAB W/ECG: CPT

## 2023-11-10 ENCOUNTER — HOSPITAL ENCOUNTER (OUTPATIENT)
Dept: CARDIAC REHAB | Age: 68
Setting detail: THERAPIES SERIES
Discharge: HOME | End: 2023-11-10
Payer: MEDICARE

## 2023-11-10 PROCEDURE — 93798 PHYS/QHP OP CAR RHAB W/ECG: CPT

## 2023-11-13 ENCOUNTER — HOSPITAL ENCOUNTER (OUTPATIENT)
Dept: CARDIAC REHAB | Age: 68
Setting detail: THERAPIES SERIES
Discharge: HOME | End: 2023-11-13
Payer: MEDICARE

## 2023-11-13 PROCEDURE — 93798 PHYS/QHP OP CAR RHAB W/ECG: CPT

## 2023-11-15 ENCOUNTER — HOSPITAL ENCOUNTER (OUTPATIENT)
Dept: CARDIAC REHAB | Age: 68
Setting detail: THERAPIES SERIES
Discharge: HOME | End: 2023-11-15
Payer: MEDICARE

## 2023-11-15 PROCEDURE — 93798 PHYS/QHP OP CAR RHAB W/ECG: CPT

## 2023-11-17 ENCOUNTER — HOSPITAL ENCOUNTER (OUTPATIENT)
Dept: CARDIAC REHAB | Age: 68
Setting detail: THERAPIES SERIES
Discharge: HOME | End: 2023-11-17
Payer: MEDICARE

## 2023-11-17 PROCEDURE — 93798 PHYS/QHP OP CAR RHAB W/ECG: CPT

## 2023-11-20 ENCOUNTER — HOSPITAL ENCOUNTER (OUTPATIENT)
Dept: CARDIAC REHAB | Age: 68
Setting detail: THERAPIES SERIES
Discharge: HOME | End: 2023-11-20
Payer: MEDICARE

## 2023-11-20 PROCEDURE — 93798 PHYS/QHP OP CAR RHAB W/ECG: CPT

## 2023-11-22 ENCOUNTER — HOSPITAL ENCOUNTER (OUTPATIENT)
Dept: CARDIAC REHAB | Age: 68
Setting detail: THERAPIES SERIES
Discharge: HOME | End: 2023-11-22
Payer: MEDICARE

## 2023-11-22 NOTE — CARDIO/PULMONARY
Pt called and stated that they felt unwell.     Electronically signed by Vera Graff on 11/22/2023 at 10:55 AM

## 2023-11-24 ENCOUNTER — APPOINTMENT (OUTPATIENT)
Dept: CARDIAC REHAB | Age: 68
End: 2023-11-24
Payer: MEDICARE

## 2023-11-29 ENCOUNTER — HOSPITAL ENCOUNTER (OUTPATIENT)
Dept: CARDIAC REHAB | Age: 68
Setting detail: THERAPIES SERIES
Discharge: HOME | End: 2023-11-29
Payer: MEDICARE

## 2023-11-29 NOTE — PLAN OF CARE
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    Exercise Education  Education: Signs/symptoms to report; RPE scale; Physically active; Warm up/cool down; Equipment orientation; Self pulse    Psychosocial  Stages of Change: Action  [facility] Total Score: 28  PHQ-9 Total Score: 7    Psychosocial Intervention  Currently Taking Psychotropic Meds: No    Psychosocial Education  Education: Cardiac meds; Impact self care behaviors on health; Sexual activity; Relaxation techniques; Tobacco dangers    Psychosocial Target Goals  Target Goal(s): Engages in self-care behaviors; Assess presence or absence of depression using a valid screening tool  Uses Stress Mgmt Techniques: -- (enjoys sewing, gardening, and grandkids.)    Tobacco  Stages of Change: Contemplate  Tobacco Use: (S) Yes  Quit: -- (patient quit in May 2023 but then was around friends and started smoking again.)  Date Started: 01/01/71  Quit Date Set: not ready to set quit date. # Cigarette Smoked/Day: patient is still trying to cut down on number of cigarettes per day. Smokeless Tobacco Use: No    Tobacco Cessation Intervention  Smoking Cessation Referral: Yes  Tobacco Adjunct: Yes    Tobacco Education  Resource Information Provided: -- (Reminded pt again to return smoking packet, offered new one if previous one lost.  Stated old one still at home on counter and forgets to bring it.)  Tobacco Triggers: -- (drinks coffee in morning with cigarette)    Nutrition  Stages of Change: Preparation  Diabetes: No  HgbA1c: 6.1  HgbA1c Date: 05/19/23  Diabetes Med(s): Jardiance 10 mg.     Lipids  Date Lipids Drawn: 05/19/23  Total: 276  HDL: 41  LDL: 207  Triglycerides: 139    Weight Management  Weight : 71.7 kg (158 lb)  Height : 1.549 m (5' 1\")  BMI: 29.92  Alcohol: None  Nutrition Assessment Tool: Rate Your Plate  Rate Your Plate Total Score: 39    Nutrition

## 2023-12-01 ENCOUNTER — HOSPITAL ENCOUNTER (OUTPATIENT)
Dept: CARDIAC REHAB | Age: 68
Setting detail: THERAPIES SERIES
Discharge: HOME | End: 2023-12-01

## 2023-12-04 ENCOUNTER — HOSPITAL ENCOUNTER (OUTPATIENT)
Dept: CARDIAC REHAB | Age: 68
Setting detail: THERAPIES SERIES
Discharge: HOME | End: 2023-12-04

## 2023-12-04 NOTE — CARDIO/PULMONARY
Pt stated that they see cardiologist on 12/11/2023 and will determine weather they need further workup or if it is safe to resume exercise, pt feels \"like chest is ripping apart. \" Pt stated that they will let rehab staff know status update as soon as they know.      Electronically signed by Jeffrey Worley on 12/4/2023 at 9:45 AM

## 2024-02-15 DIAGNOSIS — J44.9 CHRONIC OBSTRUCTIVE PULMONARY DISEASE, UNSPECIFIED COPD TYPE (HCC): Primary | ICD-10-CM

## 2024-02-15 RX ORDER — UMECLIDINIUM BROMIDE AND VILANTEROL TRIFENATATE 62.5; 25 UG/1; UG/1
POWDER RESPIRATORY (INHALATION)
Qty: 1 EACH | Refills: 1 | Status: SHIPPED | OUTPATIENT
Start: 2024-02-15

## 2024-05-31 DIAGNOSIS — J44.9 CHRONIC OBSTRUCTIVE PULMONARY DISEASE, UNSPECIFIED COPD TYPE (HCC): ICD-10-CM

## 2024-06-02 RX ORDER — UMECLIDINIUM BROMIDE AND VILANTEROL TRIFENATATE 62.5; 25 UG/1; UG/1
POWDER RESPIRATORY (INHALATION)
Qty: 1 EACH | Refills: 1 | Status: SHIPPED | OUTPATIENT
Start: 2024-06-02

## 2025-05-15 ENCOUNTER — OFFICE VISIT (OUTPATIENT)
Dept: PULMONOLOGY | Age: 70
End: 2025-05-15
Payer: MEDICARE

## 2025-05-15 VITALS
WEIGHT: 166.2 LBS | HEIGHT: 61 IN | RESPIRATION RATE: 16 BRPM | BODY MASS INDEX: 31.38 KG/M2 | SYSTOLIC BLOOD PRESSURE: 116 MMHG | HEART RATE: 89 BPM | DIASTOLIC BLOOD PRESSURE: 68 MMHG | OXYGEN SATURATION: 93 %

## 2025-05-15 DIAGNOSIS — Z87.891 PERSONAL HISTORY OF TOBACCO USE: ICD-10-CM

## 2025-05-15 DIAGNOSIS — R91.1 PULMONARY NODULE: ICD-10-CM

## 2025-05-15 DIAGNOSIS — J43.9 PULMONARY EMPHYSEMA, UNSPECIFIED EMPHYSEMA TYPE (HCC): Primary | ICD-10-CM

## 2025-05-15 DIAGNOSIS — Z72.0 TOBACCO ABUSE: ICD-10-CM

## 2025-05-15 PROCEDURE — G0296 VISIT TO DETERM LDCT ELIG: HCPCS | Performed by: INTERNAL MEDICINE

## 2025-05-15 PROCEDURE — 1123F ACP DISCUSS/DSCN MKR DOCD: CPT | Performed by: INTERNAL MEDICINE

## 2025-05-15 PROCEDURE — G8417 CALC BMI ABV UP PARAM F/U: HCPCS | Performed by: INTERNAL MEDICINE

## 2025-05-15 PROCEDURE — G2211 COMPLEX E/M VISIT ADD ON: HCPCS | Performed by: INTERNAL MEDICINE

## 2025-05-15 PROCEDURE — 1090F PRES/ABSN URINE INCON ASSESS: CPT | Performed by: INTERNAL MEDICINE

## 2025-05-15 PROCEDURE — 1159F MED LIST DOCD IN RCRD: CPT | Performed by: INTERNAL MEDICINE

## 2025-05-15 PROCEDURE — 4004F PT TOBACCO SCREEN RCVD TLK: CPT | Performed by: INTERNAL MEDICINE

## 2025-05-15 PROCEDURE — 3023F SPIROM DOC REV: CPT | Performed by: INTERNAL MEDICINE

## 2025-05-15 PROCEDURE — G8400 PT W/DXA NO RESULTS DOC: HCPCS | Performed by: INTERNAL MEDICINE

## 2025-05-15 PROCEDURE — G8427 DOCREV CUR MEDS BY ELIG CLIN: HCPCS | Performed by: INTERNAL MEDICINE

## 2025-05-15 PROCEDURE — 3017F COLORECTAL CA SCREEN DOC REV: CPT | Performed by: INTERNAL MEDICINE

## 2025-05-15 PROCEDURE — 99214 OFFICE O/P EST MOD 30 MIN: CPT | Performed by: INTERNAL MEDICINE

## 2025-05-15 RX ORDER — BUDESONIDE AND FORMOTEROL FUMARATE DIHYDRATE 160; 4.5 UG/1; UG/1
2 AEROSOL RESPIRATORY (INHALATION) 2 TIMES DAILY
Qty: 10.2 G | Refills: 5 | Status: SHIPPED | OUTPATIENT
Start: 2025-05-15

## 2025-05-15 RX ORDER — EVOLOCUMAB 140 MG/ML
INJECTION, SOLUTION SUBCUTANEOUS
COMMUNITY

## 2025-05-15 RX ORDER — SODIUM FLUORIDE 5 MG/ML
PASTE, DENTIFRICE DENTAL
COMMUNITY
Start: 2025-04-02

## 2025-05-15 RX ORDER — IPRATROPIUM BROMIDE AND ALBUTEROL SULFATE 2.5; .5 MG/3ML; MG/3ML
1 SOLUTION RESPIRATORY (INHALATION) EVERY 6 HOURS PRN
Qty: 360 ML | Refills: 0 | Status: SHIPPED | OUTPATIENT
Start: 2025-05-15 | End: 2025-06-14

## 2025-05-15 RX ORDER — DAPAGLIFLOZIN 10 MG/1
10 TABLET, FILM COATED ORAL DAILY
COMMUNITY
Start: 2025-02-11

## 2025-05-15 RX ORDER — ALENDRONATE SODIUM 70 MG/1
70 TABLET ORAL
COMMUNITY
Start: 2024-09-17

## 2025-05-15 RX ORDER — CHLORHEXIDINE GLUCONATE 1.2 MG/ML
RINSE ORAL
COMMUNITY
Start: 2025-04-02

## 2025-05-15 RX ORDER — GABAPENTIN 100 MG/1
CAPSULE ORAL
COMMUNITY
Start: 2025-03-19

## 2025-05-15 RX ORDER — OXYCODONE HYDROCHLORIDE 10 MG/1
TABLET ORAL
COMMUNITY

## 2025-05-15 NOTE — PROGRESS NOTES
nodule within the left lung apex, unchanged dating back to  07/15/2020.    4. Stable 3.0 cm aneurysm of the infrarenal abdominal aorta.  Potential  further follow-up of this abnormality is as advised below.     CT chest 2/9/23  images reviewed by me and showed:   1. No suspicious pulmonary mass or nodule.  2. Previously followed left apical nodule shows stability over greater than 2  years and negative prior PET-CT.  Appearance and lack of change strongly  suggests apical fibrotic scarring that is chronic.  3. Moderate centrilobular emphysema and granulomatous changes that are stable        Assessment:       Pulmonary emphysema- not controlled   KAMINI 1 cm nodule on CT 7/14/2020.  No metabolic activities on PET scan 7/24/2020.  Stable on repeat CT 2/9/23  >48 pack year smoking    CAD, ischemic cardiomyopathy, combined systolic and diastolic CHF followed by cardiology  Not jasen to afford Spiriva or Incruise       Plan:       D/C Anoro  Trial of Symbicort 160/4.5 2 puffs BID   DuoNeb (ipratropium bromide 0.5 mg/albuterol 2.5 mg per 3 mL vial)1 vial 4 times daily (maximum dose: 6 vials/day) PRN   Continue Albuterol 2 puffs Q4-6 hrs PRN  CT chest, low dose protocol, screening for lung cancer. Risks, benefits and alternatives including doing nothing were discussed with patient.  PFTs and 6MW  Mucinex and Acapella   Advised patient to quit and offered support.  Follow up in 6 months or sooner if needed             Discussed with the patient the current USPSTF guidelines released March 9, 2021 for screening for lung cancer.    For adults aged 50 to 80 years who have a 20 pack-year smoking history and currently smoke or have quit within the past 15 years the grade B recommendation is to:  Screen for lung cancer with low-dose computed tomography (LDCT) every year.  Stop screening once a person has not smoked for 15 years or has a health problem that limits life expectancy or the ability to have lung surgery.    The patient

## 2025-05-15 NOTE — PATIENT INSTRUCTIONS
Learning About Lung Cancer Screening  What is screening for lung cancer?     Lung cancer screening is a way to find some lung cancers early, before a person has any symptoms of the cancer.  Lung cancer screening may help those who have the highest risk for lung cancer--people age 50 and older who are or were heavy smokers. For most people, who aren't at increased risk, screening for lung cancer probably isn't helpful.  Screening won't prevent cancer. And it may not find all lung cancers. Lung cancer screening may lower the risk of dying from lung cancer in a small number of people.  How is it done?  Lung cancer screening is done with a low-dose CT (computed tomography) scan. A CT scan uses X-rays, or radiation, to make detailed pictures of your body. Experts recommend that screening be done in medical centers that focus on finding and treating lung cancer.  Who is screening recommended for?  Lung cancer screening is recommended for people age 50 and older who are or were heavy smokers. That means people with a smoking history of at least 20 pack years. A pack year is a way to measure how heavy a smoker you are or were.  To figure out your pack years, multiply how many packs a day on average (assuming 20 cigarettes per pack) you have smoked by how many years you have smoked. For example:  If you smoked 1 pack a day for 20 years, that's 1 times 20. So you have a smoking history of 20 pack years.  If you smoked 2 packs a day for 10 years, that's 2 times 10. So you have a smoking history of 20 pack years.  Experts agree that screening is for people who have a high risk of lung cancer. But experts don't agree on what high risk means. Some say people age 50 or older with at least a 20-pack-year smoking history are high risk. Others say it's people age 55 or older with a 30-pack-year history.  To see if you could benefit from screening, first find out if you are at high risk for lung cancer. Your doctor can help

## 2025-05-22 ENCOUNTER — TELEPHONE (OUTPATIENT)
Dept: PULMONOLOGY | Age: 70
End: 2025-05-22

## 2025-05-22 DIAGNOSIS — J43.9 PULMONARY EMPHYSEMA, UNSPECIFIED EMPHYSEMA TYPE (HCC): ICD-10-CM

## 2025-05-22 RX ORDER — IPRATROPIUM BROMIDE AND ALBUTEROL SULFATE 2.5; .5 MG/3ML; MG/3ML
1 SOLUTION RESPIRATORY (INHALATION) EVERY 6 HOURS PRN
Qty: 360 ML | Refills: 0 | Status: SHIPPED | OUTPATIENT
Start: 2025-05-22 | End: 2025-05-22

## 2025-05-22 RX ORDER — IPRATROPIUM BROMIDE AND ALBUTEROL SULFATE 2.5; .5 MG/3ML; MG/3ML
SOLUTION RESPIRATORY (INHALATION)
Qty: 1 EACH | Refills: 3 | Status: SHIPPED | OUTPATIENT
Start: 2025-05-22

## 2025-05-22 NOTE — TELEPHONE ENCOUNTER
ipratropium 0.5 mg-albuterol 2.5 mg (DUONEB) 0.5-2.5 (3) MG/3ML SOLN nebulizer solution [[id number]]    Order Details  Dose: 1 vial Route: Inhalation Frequency: EVERY 6 HOURS PRN for Shortness of Breath   Dispense Quantity: 360 mL Refills: 0          Sig: Inhale 3 mLs into the lungs every 6 hours as needed for Shortness of Breath       Patient is needing medication Rx sent to [facility] can not fill the need.

## 2025-05-23 NOTE — TELEPHONE ENCOUNTER
I called and spoke to [facility] pharmacy  due to receiving another fax about this medication. I checked to make sure everything was okay with this and they stated it was and the med was ready for pickup

## 2025-06-03 ENCOUNTER — RESULTS FOLLOW-UP (OUTPATIENT)
Dept: PULMONOLOGY | Age: 70
End: 2025-06-03

## 2025-06-03 ENCOUNTER — HOSPITAL ENCOUNTER (OUTPATIENT)
Dept: CT IMAGING | Age: 70
Discharge: HOME | End: 2025-06-03
Attending: INTERNAL MEDICINE
Payer: MEDICARE

## 2025-06-03 ENCOUNTER — HOSPITAL ENCOUNTER (OUTPATIENT)
Dept: PULMONOLOGY | Age: 70
End: 2025-06-03
Attending: INTERNAL MEDICINE
Payer: MEDICARE

## 2025-06-03 DIAGNOSIS — Z87.891 PERSONAL HISTORY OF TOBACCO USE: ICD-10-CM

## 2025-06-03 PROCEDURE — 71271 CT THORAX LUNG CANCER SCR C-: CPT

## 2025-06-10 ENCOUNTER — HOSPITAL ENCOUNTER (OUTPATIENT)
Dept: PULMONOLOGY | Age: 70
Discharge: HOME | End: 2025-06-10
Attending: INTERNAL MEDICINE
Payer: MEDICARE

## 2025-06-10 DIAGNOSIS — J43.9 PULMONARY EMPHYSEMA, UNSPECIFIED EMPHYSEMA TYPE (HCC): ICD-10-CM

## 2025-06-10 LAB
DLCO %PRED: 46 %
DLCO PRED: NORMAL
DLCO/VA %PRED: NORMAL
DLCO/VA PRED: NORMAL
DLCO/VA: NORMAL
DLCO: NORMAL
EXPIRATORY TIME-POST: NORMAL
EXPIRATORY TIME: NORMAL
FEF 25-75 %CHNG: NORMAL
FEF 25-75 POST %PRED: NORMAL
FEF 25-75% %PRED-PRE: NORMAL
FEF 25-75% PRED: NORMAL
FEF 25-75-POST: NORMAL
FEF 25-75-PRE: NORMAL
FEV1 %PRED-POST: 55 %
FEV1 %PRED-PRE: 52 %
FEV1 PRED: NORMAL
FEV1-POST: NORMAL
FEV1-PRE: NORMAL
FEV1/FVC %PRED-POST: NORMAL
FEV1/FVC %PRED-PRE: NORMAL
FEV1/FVC PRED: NORMAL
FEV1/FVC-POST: 71 %
FEV1/FVC-PRE: 82 %
FVC %PRED-POST: NORMAL
FVC %PRED-PRE: NORMAL
FVC PRED: NORMAL
FVC-POST: NORMAL
FVC-PRE: NORMAL
GAW %PRED: NORMAL
GAW PRED: NORMAL
GAW: NORMAL
IC PRE %PRED: NORMAL
IC PRED: NORMAL
IC: NORMAL
MEP: NORMAL
MIP: NORMAL
MVV %PRED-PRE: NORMAL
MVV PRED: NORMAL
MVV-PRE: NORMAL
PEF %PRED-POST: NORMAL
PEF %PRED-PRE: NORMAL
PEF PRED: NORMAL
PEF%CHNG: NORMAL
PEF-POST: NORMAL
PEF-PRE: NORMAL
RAW %PRED: NORMAL
RAW PRED: NORMAL
RAW: NORMAL
RV PRE %PRED: NORMAL
RV PRED: NORMAL
RV: NORMAL
SVC %PRED: NORMAL
SVC PRED: NORMAL
SVC: NORMAL
TLC PRE %PRED: 46 %
TLC PRED: NORMAL
TLC: NORMAL
VA %PRED: NORMAL
VA PRED: NORMAL
VA: NORMAL
VTG %PRED: NORMAL
VTG PRED: NORMAL
VTG: NORMAL

## 2025-06-10 PROCEDURE — 94729 DIFFUSING CAPACITY: CPT

## 2025-06-10 PROCEDURE — 94060 EVALUATION OF WHEEZING: CPT

## 2025-06-10 PROCEDURE — 94726 PLETHYSMOGRAPHY LUNG VOLUMES: CPT

## 2025-06-10 PROCEDURE — 6370000000 HC RX 637 (ALT 250 FOR IP): Performed by: INTERNAL MEDICINE

## 2025-06-10 PROCEDURE — 94640 AIRWAY INHALATION TREATMENT: CPT

## 2025-06-10 PROCEDURE — 94618 PULMONARY STRESS TESTING: CPT

## 2025-06-10 RX ORDER — ALBUTEROL SULFATE 90 UG/1
4 INHALANT RESPIRATORY (INHALATION) ONCE
Status: COMPLETED | OUTPATIENT
Start: 2025-06-10 | End: 2025-06-10

## 2025-06-10 RX ADMIN — ALBUTEROL SULFATE 4 PUFF: 90 AEROSOL, METERED RESPIRATORY (INHALATION) at 09:01

## 2025-06-10 ASSESSMENT — PULMONARY FUNCTION TESTS
FEV1_PERCENT_PREDICTED_POST: 55
FEV1/FVC_POST: 71
FEV1/FVC_PRE: 82
FEV1_PERCENT_PREDICTED_PRE: 52

## 2025-06-11 ENCOUNTER — TELEPHONE (OUTPATIENT)
Dept: PULMONOLOGY | Age: 70
End: 2025-06-11

## 2025-06-11 NOTE — TELEPHONE ENCOUNTER
Pt called wanting to let Provider know that the Symbicort is making her sick. Pt does not need anything to replace just wanted to inform provider.

## (undated) DEVICE — BANDAGE,GAUZE,4.5"X4.1YD,STERILE,LF: Brand: MEDLINE

## (undated) DEVICE — PATCH SURG FIBRIN SEAL 4.8X4.8 CM ABSORBABLE TACHOSIL

## (undated) DEVICE — SYRINGE MED 10ML LUERLOCK TIP W/O SFTY DISP

## (undated) DEVICE — SHEET,DRAPE,53X77,STERILE: Brand: MEDLINE

## (undated) DEVICE — SUTURE NONABSORBABLE MONOFILAMENT 6-0 C-1 1X30 IN PROLENE 8706H

## (undated) DEVICE — GOWN SIRUS NONREIN XL W/TWL: Brand: MEDLINE INDUSTRIES, INC.

## (undated) DEVICE — RETRACTOR SURG INSRT SUT HLD OCTOBASE

## (undated) DEVICE — TOWEL,OR,DSP,ST,BLUE,STD,6/PK,12PK/CS: Brand: MEDLINE

## (undated) DEVICE — LINE TBL CSC-14 FOR CARDPLG DEL SYS

## (undated) DEVICE — SUTURE MCRYL SZ 4-0 L18IN ABSRB UD L19MM PS-2 3/8 CIR PRIM Y496G

## (undated) DEVICE — SYRINGE ANGIO 12ML COR CTRL ROT ADPT SLD PLUNG CLR BRL M

## (undated) DEVICE — COVER LT HNDL PLAS RIG 2 PER PK

## (undated) DEVICE — Device

## (undated) DEVICE — PACK PROCEDURE SURG OPN HRT A BASIC

## (undated) DEVICE — DEVICE SURGICAL SWITCH FLOW HIGH PRESSURE M/F LL

## (undated) DEVICE — SNAP KOVER: Brand: UNBRANDED

## (undated) DEVICE — KIT,ANTI FOG,W/SPONGE & FLUID,SOFT PACK: Brand: MEDLINE

## (undated) DEVICE — PADDING UNDERCAST W6INXL4YD WYTEX 6 PER BG

## (undated) DEVICE — TRAY,ERASE CAUTI,URN MTR,SILI,16FR10ML: Brand: MEDLINE

## (undated) DEVICE — BANDAGE ACE 4X5YDNS

## (undated) DEVICE — SUTURE PDS + SZ 0 L36IN ABSRB VLT CT 1 L36MM 1 2 CIR PDP346H

## (undated) DEVICE — SUTURE ETHLN SZ 3-0 L18IN NONABSORBABLE BLK PS-2 L19MM 3/8 1669H

## (undated) DEVICE — PADDING UNDERCAST W4INXL4YD COT FBR LO LINTING WYTEX

## (undated) DEVICE — SUTURE VCRL + SZ 2-0 L27IN ABSRB CLR CT-1 1/2 CIR TAPERCUT VCP259H

## (undated) DEVICE — DISH PETRI ST LF

## (undated) DEVICE — NEEDLE HYPO 18GA L1.5IN PNK POLYPR HUB S STL THN WALL FILL

## (undated) DEVICE — EVERGRIP INSERT SET: Brand: FOGARTY EVERGRIP

## (undated) DEVICE — Z CONVERTED USE 2271043 CONTAINER SPEC COLL 4OZ SCR ON LID PEEL PCH

## (undated) DEVICE — SUTURE PERMAHAND SZ 3-0 L30IN NONABSORBABLE BLK SILK BRAID A304H

## (undated) DEVICE — SUTURE VCRL + SZ 3-0 L27IN ABSRB UD L26MM SH 1/2 CIR VCP416H

## (undated) DEVICE — SUTURE ETHBND EXCEL SZ 0 L18IN NONABSORBABLE GRN L36MM CT-1 CX21D

## (undated) DEVICE — GLOVE SURG SZ 8 L11.77IN FNGR THK9.8MIL STRW LTX POLYMER

## (undated) DEVICE — SUTURE PROL SZ 6-0 L24IN NONABSORBABLE BLU L13MM C-1 3/8 8726H

## (undated) DEVICE — BANDAGE COBAN 6 IN WND 6INX5YD FOAM

## (undated) DEVICE — SMALL OSC. SAW BLADE, 9MM X 24.6MM X 0.38MM: Brand: MICROAIRE®

## (undated) DEVICE — COVER US PRB W12XL244CM SURGICAL INTRAOPERATIVE PLAS TAPR L

## (undated) DEVICE — SSC BONE WAX: Brand: SSC BONE WAX

## (undated) DEVICE — INTENDED TO BE USED TO OCCLUDE, RETRACT AND IDENTIFY ARTERIES, VEINS, TENDONS AND NERVES IN SURGICAL PROCEDURES: Brand: STERION®  VESSEL LOOP

## (undated) DEVICE — PUNCH AORT DIA4MM LNG HNDL

## (undated) DEVICE — KIT BLWR MISTER 5P 15L W/ TBNG SET IRRIG MIST TO IMPROVE

## (undated) DEVICE — 20 ML SYRINGE LUER-LOCK TIP: Brand: MONOJECT

## (undated) DEVICE — SUTURE NONABSORBABLE MONOFILAMENT 7-0 BV-1 1X24 IN PROLENE 8702H

## (undated) DEVICE — LOOP VES L406MM DIA1MM MAXI BLU SIL RADPQ DISP

## (undated) DEVICE — SNAP KAP: Brand: UNBRANDED

## (undated) DEVICE — GEL US 20GM NONIRRITATING OVERWRAPPED FILE PCH TRNSMIT

## (undated) DEVICE — GLOVE SURG SZ 65 L12IN FNGR THK94MIL STD WHT ISOLEX LTX

## (undated) DEVICE — SOLUTION IV IRRIG 500ML 0.9% SODIUM CHL 2F7123

## (undated) DEVICE — APPLICATOR MEDICATED 26 CC TINT HI-LITE ORNG STRL CHLORAPREP

## (undated) DEVICE — BANDAGE COMPR W3INXL5YD TAN BRTH SELF ADH WRP W/ HND TEAR

## (undated) DEVICE — COTTON UNDERCAST PADDING,CRIMPED FINISH: Brand: WEBRIL

## (undated) DEVICE — SUTURE NONABSORBABLE MONOFILAMENT 4-0 RB-1 36 IN BLU PROLENE 8557H

## (undated) DEVICE — FOGARTY ARTERIAL EMBOLECTOMY CATHETER 3F 40CM: Brand: FOGARTY

## (undated) DEVICE — STERILE LATEX POWDER-FREE SURGICAL GLOVESWITH NITRILE COATING: Brand: PROTEXIS

## (undated) DEVICE — LEAD PACEMKR MYOCARDIAL UNIPOLAR TEMP

## (undated) DEVICE — GAUZE,SPONGE,4"X4",16PLY,XRAY,STRL,LF: Brand: MEDLINE

## (undated) DEVICE — ADHESIVE SKIN CLSR 0.7ML TOP DERMBND ADV

## (undated) DEVICE — VESSEL HARVESTING KIT 7 MM ENDOSCP FOR PWR SUPL

## (undated) DEVICE — APPLICATOR MEDICATED 10.5 CC SOLUTION HI LT ORNG CHLORAPREP

## (undated) DEVICE — SYSTEM SKIN CLSR 22CM DERMBND PRINEO

## (undated) DEVICE — SUTURE VCRL SZ 3-0 L27IN ABSRB UD L26MM SH 1/2 CIR J416H

## (undated) DEVICE — CANNULA PERF 7FR L5.5IN AORT ROOT RADPQ STD TIP W/ VENT LN

## (undated) DEVICE — BLADE SURG MIC STR DBL BVL SHRP ALL ARND SHRPTOME

## (undated) DEVICE — DRAIN SURG 24FR L5/16IN DIA8MM SIL RND HUBLESS FULL FLUT

## (undated) DEVICE — NEEDLE HYPO 22GA L1.5IN BLK POLYPR HUB S STL REG BVL STR

## (undated) DEVICE — SUTURE SZ 7 L18IN NONABSORBABLE SIL CCS L48MM 1/2 CIR STRNM M655G

## (undated) DEVICE — SPLINT ORTH W4XL30IN LAYERED FBRGLS FOAM PD BRTH BK MOLD

## (undated) DEVICE — GLOVE SURG SZ 7 L12IN FNGR THK94MIL STD WHT ISOLEX LTX FREE

## (undated) DEVICE — SUTURE ETHBND EXCEL SZ 2-0 L36IN NONABSORBABLE GRN SH-2 X559H

## (undated) DEVICE — CONNECTOR PERF W0.25XH3/8IN BASE Y SHP REDUC W/O LUERLOCK

## (undated) DEVICE — SUTURE VCRL SZ 2-0 L27IN ABSRB UD L26MM CT-2 1/2 CIR J269H

## (undated) DEVICE — [HIGH FLOW INSUFFLATOR,  DO NOT USE IF PACKAGE IS DAMAGED,  KEEP DRY,  KEEP AWAY FROM SUNLIGHT,  PROTECT FROM HEAT AND RADIOACTIVE SOURCES.]: Brand: PNEUMOSURE

## (undated) DEVICE — SUTURE NONABSORBABLE MONOFILAMENT 5-0 C-1 1X24 IN PROLENE 8725H

## (undated) DEVICE — STAPLER EXT SKIN 35 WIDE S STL STPL SQUEEZE HNDL VISISTAT

## (undated) DEVICE — TUBING AUTOTRNS SUCT 1/4 IN LEN W/ ASPIR ANTICOAG SORIN

## (undated) DEVICE — APPLIER CLP L9.375IN APER 2.1MM CLS L3.8MM 20 SM TI CLP

## (undated) DEVICE — SUTURE VCRL SZ 3-0 L27IN ABSRB UD L26MM CT-2 1/2 CIR J232H

## (undated) DEVICE — SUTURE MCRYL + SZ 4-0 L18IN ABSRB UD L19MM PS-2 3/8 CIR MCP496G

## (undated) DEVICE — 3 ML SYRINGE LUER-LOCK TIP: Brand: MONOJECT

## (undated) DEVICE — CANNULA PERF AD 20FR L8.5IN ART 3/8IN CONN NVENT MTL TIP

## (undated) DEVICE — CHECK-FLO HEMOSTASIS ASSEMBLY: Brand: CHECK-FLO

## (undated) DEVICE — Z CONVERTED USE 2273232 BANDAGE COMPR W6INXL11YD E KNIT DBL SELF CLSR EZE-BAND